# Patient Record
Sex: FEMALE | Race: WHITE | Employment: OTHER | URBAN - METROPOLITAN AREA
[De-identification: names, ages, dates, MRNs, and addresses within clinical notes are randomized per-mention and may not be internally consistent; named-entity substitution may affect disease eponyms.]

---

## 2018-04-10 ENCOUNTER — OFFICE VISIT (OUTPATIENT)
Dept: CARDIOLOGY CLINIC | Age: 73
End: 2018-04-10
Payer: MEDICARE

## 2018-04-10 VITALS
HEIGHT: 63 IN | HEART RATE: 65 BPM | WEIGHT: 152 LBS | SYSTOLIC BLOOD PRESSURE: 132 MMHG | BODY MASS INDEX: 26.93 KG/M2 | RESPIRATION RATE: 16 BRPM | DIASTOLIC BLOOD PRESSURE: 70 MMHG

## 2018-04-10 DIAGNOSIS — I10 ESSENTIAL HYPERTENSION: ICD-10-CM

## 2018-04-10 DIAGNOSIS — R94.39 ABNORMAL STRESS TEST: Primary | ICD-10-CM

## 2018-04-10 DIAGNOSIS — E78.2 MIXED HYPERLIPIDEMIA: ICD-10-CM

## 2018-04-10 DIAGNOSIS — E78.5 HYPERLIPIDEMIA, UNSPECIFIED HYPERLIPIDEMIA TYPE: ICD-10-CM

## 2018-04-10 DIAGNOSIS — R94.39 ABNORMAL STRESS TEST: ICD-10-CM

## 2018-04-10 DIAGNOSIS — R53.82 CHRONIC FATIGUE: ICD-10-CM

## 2018-04-10 PROCEDURE — 1123F ACP DISCUSS/DSCN MKR DOCD: CPT | Performed by: INTERNAL MEDICINE

## 2018-04-10 PROCEDURE — 1090F PRES/ABSN URINE INCON ASSESS: CPT | Performed by: INTERNAL MEDICINE

## 2018-04-10 PROCEDURE — G8427 DOCREV CUR MEDS BY ELIG CLIN: HCPCS | Performed by: INTERNAL MEDICINE

## 2018-04-10 PROCEDURE — 3014F SCREEN MAMMO DOC REV: CPT | Performed by: INTERNAL MEDICINE

## 2018-04-10 PROCEDURE — 1036F TOBACCO NON-USER: CPT | Performed by: INTERNAL MEDICINE

## 2018-04-10 PROCEDURE — 99214 OFFICE O/P EST MOD 30 MIN: CPT | Performed by: INTERNAL MEDICINE

## 2018-04-10 PROCEDURE — 93000 ELECTROCARDIOGRAM COMPLETE: CPT | Performed by: INTERNAL MEDICINE

## 2018-04-10 PROCEDURE — 4040F PNEUMOC VAC/ADMIN/RCVD: CPT | Performed by: INTERNAL MEDICINE

## 2018-04-10 PROCEDURE — G8400 PT W/DXA NO RESULTS DOC: HCPCS | Performed by: INTERNAL MEDICINE

## 2018-04-10 PROCEDURE — G8419 CALC BMI OUT NRM PARAM NOF/U: HCPCS | Performed by: INTERNAL MEDICINE

## 2018-04-10 PROCEDURE — 3017F COLORECTAL CA SCREEN DOC REV: CPT | Performed by: INTERNAL MEDICINE

## 2018-04-10 RX ORDER — ISOSORBIDE MONONITRATE 30 MG/1
30 TABLET, EXTENDED RELEASE ORAL DAILY
Qty: 90 TABLET | Refills: 3 | Status: SHIPPED | OUTPATIENT
Start: 2018-04-10 | End: 2018-04-10 | Stop reason: SDUPTHER

## 2018-04-10 RX ORDER — ISOSORBIDE MONONITRATE 30 MG/1
30 TABLET, EXTENDED RELEASE ORAL DAILY
Qty: 90 TABLET | Refills: 3 | Status: SHIPPED | OUTPATIENT
Start: 2018-04-10 | End: 2019-09-06 | Stop reason: SDUPTHER

## 2018-04-10 RX ORDER — TRAMADOL HYDROCHLORIDE 50 MG/1
50 TABLET ORAL EVERY 8 HOURS PRN
COMMUNITY
Start: 2018-02-12 | End: 2021-04-14

## 2018-07-18 ENCOUNTER — HOSPITAL ENCOUNTER (OUTPATIENT)
Dept: GENERAL RADIOLOGY | Age: 73
Discharge: HOME | End: 2018-07-20
Payer: MEDICARE

## 2018-07-18 ENCOUNTER — HOSPITAL ENCOUNTER (OUTPATIENT)
Age: 73
Discharge: HOME | End: 2018-07-20
Payer: MEDICARE

## 2018-07-18 DIAGNOSIS — R05.9 COUGH: ICD-10-CM

## 2018-07-18 PROCEDURE — 71046 X-RAY EXAM CHEST 2 VIEWS: CPT

## 2018-10-10 ENCOUNTER — OFFICE VISIT (OUTPATIENT)
Dept: CARDIOLOGY CLINIC | Age: 73
End: 2018-10-10
Payer: MEDICARE

## 2018-10-10 VITALS
WEIGHT: 147.4 LBS | RESPIRATION RATE: 18 BRPM | BODY MASS INDEX: 26.12 KG/M2 | DIASTOLIC BLOOD PRESSURE: 78 MMHG | HEART RATE: 63 BPM | HEIGHT: 63 IN | SYSTOLIC BLOOD PRESSURE: 132 MMHG

## 2018-10-10 DIAGNOSIS — I51.9 HEART PROBLEM: ICD-10-CM

## 2018-10-10 DIAGNOSIS — I10 ESSENTIAL HYPERTENSION: ICD-10-CM

## 2018-10-10 DIAGNOSIS — R94.39 ABNORMAL STRESS TEST: Primary | ICD-10-CM

## 2018-10-10 DIAGNOSIS — E78.2 MIXED HYPERLIPIDEMIA: ICD-10-CM

## 2018-10-10 DIAGNOSIS — R53.82 CHRONIC FATIGUE: ICD-10-CM

## 2018-10-10 PROCEDURE — 93000 ELECTROCARDIOGRAM COMPLETE: CPT | Performed by: INTERNAL MEDICINE

## 2018-10-10 PROCEDURE — 1036F TOBACCO NON-USER: CPT | Performed by: INTERNAL MEDICINE

## 2018-10-10 PROCEDURE — 3017F COLORECTAL CA SCREEN DOC REV: CPT | Performed by: INTERNAL MEDICINE

## 2018-10-10 PROCEDURE — 4040F PNEUMOC VAC/ADMIN/RCVD: CPT | Performed by: INTERNAL MEDICINE

## 2018-10-10 PROCEDURE — 1101F PT FALLS ASSESS-DOCD LE1/YR: CPT | Performed by: INTERNAL MEDICINE

## 2018-10-10 PROCEDURE — G8484 FLU IMMUNIZE NO ADMIN: HCPCS | Performed by: INTERNAL MEDICINE

## 2018-10-10 PROCEDURE — 1123F ACP DISCUSS/DSCN MKR DOCD: CPT | Performed by: INTERNAL MEDICINE

## 2018-10-10 PROCEDURE — G8419 CALC BMI OUT NRM PARAM NOF/U: HCPCS | Performed by: INTERNAL MEDICINE

## 2018-10-10 PROCEDURE — G8400 PT W/DXA NO RESULTS DOC: HCPCS | Performed by: INTERNAL MEDICINE

## 2018-10-10 PROCEDURE — 99213 OFFICE O/P EST LOW 20 MIN: CPT | Performed by: INTERNAL MEDICINE

## 2018-10-10 PROCEDURE — 1090F PRES/ABSN URINE INCON ASSESS: CPT | Performed by: INTERNAL MEDICINE

## 2018-10-10 PROCEDURE — G8427 DOCREV CUR MEDS BY ELIG CLIN: HCPCS | Performed by: INTERNAL MEDICINE

## 2019-09-06 DIAGNOSIS — I10 ESSENTIAL HYPERTENSION: ICD-10-CM

## 2019-09-06 DIAGNOSIS — E78.5 HYPERLIPIDEMIA, UNSPECIFIED HYPERLIPIDEMIA TYPE: ICD-10-CM

## 2019-09-06 DIAGNOSIS — R94.39 ABNORMAL STRESS TEST: ICD-10-CM

## 2019-09-06 RX ORDER — ISOSORBIDE MONONITRATE 30 MG/1
TABLET, EXTENDED RELEASE ORAL
Qty: 30 TABLET | Refills: 2 | Status: SHIPPED | OUTPATIENT
Start: 2019-09-06 | End: 2019-09-09 | Stop reason: SDUPTHER

## 2019-09-09 DIAGNOSIS — R94.39 ABNORMAL STRESS TEST: ICD-10-CM

## 2019-09-09 DIAGNOSIS — E78.5 HYPERLIPIDEMIA, UNSPECIFIED HYPERLIPIDEMIA TYPE: ICD-10-CM

## 2019-09-09 DIAGNOSIS — I10 ESSENTIAL HYPERTENSION: ICD-10-CM

## 2019-09-09 RX ORDER — ISOSORBIDE MONONITRATE 30 MG/1
TABLET, EXTENDED RELEASE ORAL
Qty: 90 TABLET | Refills: 3 | Status: SHIPPED
Start: 2019-09-09 | End: 2020-04-13 | Stop reason: SDUPTHER

## 2019-11-08 ENCOUNTER — OFFICE VISIT (OUTPATIENT)
Dept: CARDIOLOGY CLINIC | Age: 74
End: 2019-11-08
Payer: MEDICARE

## 2019-11-08 VITALS
RESPIRATION RATE: 16 BRPM | WEIGHT: 150 LBS | DIASTOLIC BLOOD PRESSURE: 70 MMHG | HEIGHT: 63 IN | SYSTOLIC BLOOD PRESSURE: 130 MMHG | HEART RATE: 75 BPM | BODY MASS INDEX: 26.58 KG/M2

## 2019-11-08 DIAGNOSIS — R94.39 ABNORMAL STRESS TEST: ICD-10-CM

## 2019-11-08 DIAGNOSIS — I51.9 HEART PROBLEM: ICD-10-CM

## 2019-11-08 DIAGNOSIS — I10 ESSENTIAL HYPERTENSION: Primary | ICD-10-CM

## 2019-11-08 DIAGNOSIS — R53.82 CHRONIC FATIGUE: ICD-10-CM

## 2019-11-08 DIAGNOSIS — E78.2 MIXED HYPERLIPIDEMIA: ICD-10-CM

## 2019-11-08 PROCEDURE — 1123F ACP DISCUSS/DSCN MKR DOCD: CPT | Performed by: INTERNAL MEDICINE

## 2019-11-08 PROCEDURE — G8400 PT W/DXA NO RESULTS DOC: HCPCS | Performed by: INTERNAL MEDICINE

## 2019-11-08 PROCEDURE — 3017F COLORECTAL CA SCREEN DOC REV: CPT | Performed by: INTERNAL MEDICINE

## 2019-11-08 PROCEDURE — G8427 DOCREV CUR MEDS BY ELIG CLIN: HCPCS | Performed by: INTERNAL MEDICINE

## 2019-11-08 PROCEDURE — 4040F PNEUMOC VAC/ADMIN/RCVD: CPT | Performed by: INTERNAL MEDICINE

## 2019-11-08 PROCEDURE — 1036F TOBACCO NON-USER: CPT | Performed by: INTERNAL MEDICINE

## 2019-11-08 PROCEDURE — G8417 CALC BMI ABV UP PARAM F/U: HCPCS | Performed by: INTERNAL MEDICINE

## 2019-11-08 PROCEDURE — 99213 OFFICE O/P EST LOW 20 MIN: CPT | Performed by: INTERNAL MEDICINE

## 2019-11-08 PROCEDURE — 1090F PRES/ABSN URINE INCON ASSESS: CPT | Performed by: INTERNAL MEDICINE

## 2019-11-08 PROCEDURE — 93000 ELECTROCARDIOGRAM COMPLETE: CPT | Performed by: INTERNAL MEDICINE

## 2019-11-08 PROCEDURE — G8484 FLU IMMUNIZE NO ADMIN: HCPCS | Performed by: INTERNAL MEDICINE

## 2019-11-08 RX ORDER — NITROFURANTOIN 25; 75 MG/1; MG/1
CAPSULE ORAL
Refills: 0 | COMMUNITY
Start: 2019-11-05 | End: 2021-04-14

## 2020-04-13 RX ORDER — ISOSORBIDE MONONITRATE 30 MG/1
TABLET, EXTENDED RELEASE ORAL
Qty: 90 TABLET | Refills: 3 | Status: SHIPPED | OUTPATIENT
Start: 2020-04-13

## 2021-03-09 ENCOUNTER — HOSPITAL ENCOUNTER (OUTPATIENT)
Dept: GENERAL RADIOLOGY | Age: 76
Discharge: HOME | End: 2021-03-11
Payer: MEDICARE

## 2021-03-09 ENCOUNTER — HOSPITAL ENCOUNTER (OUTPATIENT)
Age: 76
Discharge: HOME | End: 2021-03-11
Payer: MEDICARE

## 2021-03-09 DIAGNOSIS — M54.40 ACUTE RIGHT-SIDED LOW BACK PAIN WITH SCIATICA, SCIATICA LATERALITY UNSPECIFIED: ICD-10-CM

## 2021-03-09 PROCEDURE — 72170 X-RAY EXAM OF PELVIS: CPT

## 2021-04-14 ENCOUNTER — OFFICE VISIT (OUTPATIENT)
Dept: CARDIOLOGY CLINIC | Age: 76
End: 2021-04-14
Payer: MEDICARE

## 2021-04-14 VITALS
HEIGHT: 63 IN | DIASTOLIC BLOOD PRESSURE: 76 MMHG | HEART RATE: 73 BPM | WEIGHT: 159 LBS | SYSTOLIC BLOOD PRESSURE: 132 MMHG | RESPIRATION RATE: 18 BRPM | BODY MASS INDEX: 28.17 KG/M2

## 2021-04-14 DIAGNOSIS — R06.09 DOE (DYSPNEA ON EXERTION): Primary | ICD-10-CM

## 2021-04-14 DIAGNOSIS — R94.39 ABNORMAL STRESS TEST: ICD-10-CM

## 2021-04-14 DIAGNOSIS — E78.2 MIXED HYPERLIPIDEMIA: ICD-10-CM

## 2021-04-14 DIAGNOSIS — I10 ESSENTIAL HYPERTENSION: ICD-10-CM

## 2021-04-14 PROCEDURE — 93000 ELECTROCARDIOGRAM COMPLETE: CPT | Performed by: INTERNAL MEDICINE

## 2021-04-14 PROCEDURE — 1036F TOBACCO NON-USER: CPT | Performed by: INTERNAL MEDICINE

## 2021-04-14 PROCEDURE — G8417 CALC BMI ABV UP PARAM F/U: HCPCS | Performed by: INTERNAL MEDICINE

## 2021-04-14 PROCEDURE — 1123F ACP DISCUSS/DSCN MKR DOCD: CPT | Performed by: INTERNAL MEDICINE

## 2021-04-14 PROCEDURE — G8400 PT W/DXA NO RESULTS DOC: HCPCS | Performed by: INTERNAL MEDICINE

## 2021-04-14 PROCEDURE — 1090F PRES/ABSN URINE INCON ASSESS: CPT | Performed by: INTERNAL MEDICINE

## 2021-04-14 PROCEDURE — G8427 DOCREV CUR MEDS BY ELIG CLIN: HCPCS | Performed by: INTERNAL MEDICINE

## 2021-04-14 PROCEDURE — 99214 OFFICE O/P EST MOD 30 MIN: CPT | Performed by: INTERNAL MEDICINE

## 2021-04-14 PROCEDURE — 3017F COLORECTAL CA SCREEN DOC REV: CPT | Performed by: INTERNAL MEDICINE

## 2021-04-14 PROCEDURE — 4040F PNEUMOC VAC/ADMIN/RCVD: CPT | Performed by: INTERNAL MEDICINE

## 2021-04-14 RX ORDER — SIMVASTATIN 40 MG
40 TABLET ORAL NIGHTLY
COMMUNITY

## 2021-04-14 RX ORDER — METOPROLOL SUCCINATE 25 MG/1
25 TABLET, EXTENDED RELEASE ORAL DAILY
Qty: 90 TABLET | Refills: 3 | Status: SHIPPED
Start: 2021-04-14 | End: 2021-05-24 | Stop reason: SINTOL

## 2021-04-14 NOTE — PROGRESS NOTES
OUTPATIENT CARDIOLOGY FOLLOW-UP    Name: Tom Kearns    Age: 70 y.o. Date of Service: 4/14/2021    Chief Complaint: Follow-up for abnormal stress test, WALKER    Referring Physician: Debra Ward MD    History of Present Illness:  Tom Kearns is a 70 y.o. female who presents today for follow-up of prior abnormal stress tests (see results below). She was previously a part time  at [employer] (retired). She denies a history of exertional chest pain. At the time of her prior office visit, she reported a > 1 year history of progressive fatigue, which she attributed to increased stress at home (24 year old granddaughter living with her, her  previously had prostate CA and lung CA) --> her  passed away in 5/2019, granddaughter initially went to college in [facility] (now she's enrolled in [employer]). Patient's energy level improved. She reports a ~ 2 month history of WALKER with moderate levels of exertion. She denies a history of palpitations, syncope, PND, or orthopnea. She is currently with no active cardiac complaints at rest. SR on EKG. Review of Systems:  Cardiac: As per HPI  General: No fever, chills  Pulmonary: As per HPI  HEENT: No visual disturbances, difficult swallowing  GI: No nausea, vomiting  : No dysuria, hematuria  Endocrine: No thyroid disease or DM  Musculoskeletal: XIONG x 4, no focal motor deficits  Skin: Intact, no rashes  Neuro: No headache, seizures  Psych: Currently with no depression, anxiety    Past Medical History:  Past Medical History:   Diagnosis Date    Hyperlipidemia     Hypertension        Past Surgical History:  Past Surgical History:   Procedure Laterality Date    COLONOSCOPY      TONSILLECTOMY       Family History:  History reviewed. No pertinent family history. Social History:  Social History     Socioeconomic History    Marital status:       Spouse name: Not on file    Number of children: Not on file    Years of education: Not on visit.        Physical Exam:  /76   Pulse 73   Resp 18   Ht 5' 3\" (1.6 m)   Wt 159 lb (72.1 kg)   BMI 28.17 kg/m²   Wt Readings from Last 3 Encounters:   04/14/21 159 lb (72.1 kg)   11/08/19 150 lb (68 kg)   10/10/18 147 lb 6.4 oz (66.9 kg)     Appearance: Awake, alert, no acute respiratory distress  Skin: Intact, no rash  Head: Normocephalic, atraumatic  Eyes: EOMI, no conjunctival erythema  ENMT: No pharyngeal erythema, MMM, no rhinorrhea  Neck: Supple, no elevated JVP, no carotid bruits  Lungs: Clear to auscultation bilaterally. No wheezes, rales, or rhonchi. Cardiac: Regular rate and rhythm, +S1S2, no murmurs apparent  Abdomen: Soft, nontender, +bowel sounds  Extremities: Moves all extremities x 4, no lower extremity edema  Neurologic: No focal motor deficits apparent, normal mood and affect    Cardiac Tests:  ECG: SR, rate 73, NSSTT changes    Exercise nuclear stress test on 10/16/15 (Dr. Sherine West):   Exercise time 6 minutes, 7 MET's, 2 mm upsloping/horizontal ST depression in leads II,III, aVF, V4-V6 with exercise and persisting 3 minutes into recovery period, no chest pain with exercise, small-sized mildly reversible defect in the mid/apical anterior walls, EF 65% with normal wall motion    Exercise nuclear stress test: 4/21/17 (Dr. Kayla Acuña)  1. Exercise EKG was positive with intermediate predictive value for ischemia.    2. The patient experienced no chest pain with exercise. 3. The myocardial perfusion imaging was abnormal.    The abnormality was a a small sized completely reversible defect in the distal anterior and apical wall suggesting distal LAD disease. 4. Overall left ventricular systolic function was normal without regional wall motion abnormalities. 5. Cabrales treadmill score was -7.5 implying intermediate risk.    6. Exercise capacity was average.    7. Intermediate risk general exercise nuclear cardiac perfusion examination. ASSESSMENT / PLAN:  1. Dyspnea on exertion  2.  Abnormal stress test (10/2015 and 4/2017)   3. HTN -- controlled, BP today 132/76  4. HLD -- previously on lipitor (stopped d/t myalgias) --> tolerating simvastatin  5. Fatigue    - She opts for continued medical management at this time / she is agreeable to cardiac catheterization pending clinical course  - Will add Toprol XL 25 mg daily  - Continue current medications otherwise (including ASA, statin, and imdur)    Greater than 30 minutes was spent counseling the patient, reviewing the rationale for the above recommendations and reviewing the patient's current medication list, problem list and results of all previously ordered testing.     Charlie Carrizales MD  [facility]) Cardiology

## 2021-05-24 ENCOUNTER — TELEPHONE (OUTPATIENT)
Dept: CARDIOLOGY CLINIC | Age: 76
End: 2021-05-24

## 2021-05-24 NOTE — TELEPHONE ENCOUNTER
Patient states she took herself off the metoprolol and her BP went to 185/91 but its back down to 146/81. She is asking for something else due to not feeling well on Metoprolol. Please advise.

## 2021-05-24 NOTE — TELEPHONE ENCOUNTER
See if she can  go back on norvasc 5 mg daily    Celestine Hill D.O.   Cardiologist  Cardiology, [facility]  \

## 2021-05-25 RX ORDER — AMLODIPINE BESYLATE 5 MG/1
5 TABLET ORAL DAILY
Qty: 30 TABLET | Refills: 5 | Status: SHIPPED
Start: 2021-05-25 | End: 2021-06-09 | Stop reason: SDUPTHER

## 2021-06-09 RX ORDER — AMLODIPINE BESYLATE 5 MG/1
5 TABLET ORAL DAILY
Qty: 90 TABLET | Refills: 3 | Status: SHIPPED
Start: 2021-06-09 | End: 2022-02-07 | Stop reason: DRUGHIGH

## 2022-01-13 ENCOUNTER — OFFICE VISIT (OUTPATIENT)
Dept: CARDIOLOGY CLINIC | Age: 77
End: 2022-01-13
Payer: MEDICARE

## 2022-01-13 VITALS
HEART RATE: 76 BPM | SYSTOLIC BLOOD PRESSURE: 132 MMHG | RESPIRATION RATE: 18 BRPM | HEIGHT: 63 IN | BODY MASS INDEX: 28.28 KG/M2 | WEIGHT: 159.6 LBS | DIASTOLIC BLOOD PRESSURE: 76 MMHG

## 2022-01-13 DIAGNOSIS — I10 ESSENTIAL HYPERTENSION: ICD-10-CM

## 2022-01-13 DIAGNOSIS — E78.2 MIXED HYPERLIPIDEMIA: ICD-10-CM

## 2022-01-13 DIAGNOSIS — R94.39 ABNORMAL STRESS TEST: ICD-10-CM

## 2022-01-13 DIAGNOSIS — R06.09 DOE (DYSPNEA ON EXERTION): Primary | ICD-10-CM

## 2022-01-13 PROCEDURE — G8484 FLU IMMUNIZE NO ADMIN: HCPCS | Performed by: INTERNAL MEDICINE

## 2022-01-13 PROCEDURE — 93000 ELECTROCARDIOGRAM COMPLETE: CPT | Performed by: INTERNAL MEDICINE

## 2022-01-13 PROCEDURE — 4040F PNEUMOC VAC/ADMIN/RCVD: CPT | Performed by: INTERNAL MEDICINE

## 2022-01-13 PROCEDURE — 99214 OFFICE O/P EST MOD 30 MIN: CPT | Performed by: INTERNAL MEDICINE

## 2022-01-13 PROCEDURE — G8400 PT W/DXA NO RESULTS DOC: HCPCS | Performed by: INTERNAL MEDICINE

## 2022-01-13 PROCEDURE — G8427 DOCREV CUR MEDS BY ELIG CLIN: HCPCS | Performed by: INTERNAL MEDICINE

## 2022-01-13 PROCEDURE — 3017F COLORECTAL CA SCREEN DOC REV: CPT | Performed by: INTERNAL MEDICINE

## 2022-01-13 PROCEDURE — 1123F ACP DISCUSS/DSCN MKR DOCD: CPT | Performed by: INTERNAL MEDICINE

## 2022-01-13 PROCEDURE — 1036F TOBACCO NON-USER: CPT | Performed by: INTERNAL MEDICINE

## 2022-01-13 PROCEDURE — 1090F PRES/ABSN URINE INCON ASSESS: CPT | Performed by: INTERNAL MEDICINE

## 2022-01-13 PROCEDURE — G8417 CALC BMI ABV UP PARAM F/U: HCPCS | Performed by: INTERNAL MEDICINE

## 2022-01-13 RX ORDER — MULTIVIT WITH MINERALS/LUTEIN
1000 TABLET ORAL DAILY
COMMUNITY

## 2022-01-13 RX ORDER — CHOLECALCIFEROL (VITAMIN D3) 125 MCG
5000 CAPSULE ORAL DAILY
COMMUNITY

## 2022-01-13 RX ORDER — PSYLLIUM HUSK 0.4 G
CAPSULE ORAL DAILY
COMMUNITY

## 2022-01-13 NOTE — PROGRESS NOTES
OUTPATIENT CARDIOLOGY FOLLOW-UP    Name: Mayela Neumann    Age: 78 y.o. Date of Service: 1/13/2022    Chief Complaint: Follow-up for abnormal stress test, WALKER    Referring Physician: Veto Sethi MD    History of Present Illness:  Mayela Neumann is a 78 y.o. female who presents today for follow-up of prior abnormal stress tests (see results below). She was previously a part time  at [employer] (retired). She denies a history of exertional chest pain. At the time of her prior office visit, she reported a > 1 year history of progressive fatigue, which she attributed to increased stress at home -- (24 year old granddaughter living with her, her  previously had prostate CA and lung CA) --> her  passed away in 5/2019, granddaughter initially went to college in [facility] (now she's enrolled in [email or web address]). Patient's energy level improved. She reports a > 1 year history history of intermittent WALKER with moderate levels of exertion. She denies a history of palpitations, syncope, PND, or orthopnea. She is currently with no active cardiac complaints at rest. SR on EKG. Review of Systems:  Cardiac: As per HPI  General: No fever, chills  Pulmonary: As per HPI  HEENT: No visual disturbances, difficult swallowing  GI: No nausea, vomiting  : No dysuria, hematuria  Endocrine: No thyroid disease or DM  Musculoskeletal: XIONG x 4, no focal motor deficits  Skin: Intact, no rashes  Neuro: No headache, seizures  Psych: Currently with no depression, anxiety    Past Medical History:  Past Medical History:   Diagnosis Date    Hyperlipidemia     Hypertension        Past Surgical History:  Past Surgical History:   Procedure Laterality Date    COLONOSCOPY      TONSILLECTOMY       Family History:  History reviewed. No pertinent family history. Social History:  Social History     Socioeconomic History    Marital status:       Spouse name: Not on file    Number of children: Not on file    Years of education: Not on file    Highest education level: Not on file   Occupational History    Not on file   Tobacco Use    Smoking status: Never Smoker    Smokeless tobacco: Never Used   Vaping Use    Vaping Use: Never used   Substance and Sexual Activity    Alcohol use: No    Drug use: No    Sexual activity: Not on file   Other Topics Concern    Not on file   Social History Narrative    Not on file     Social Determinants of Health     Financial Resource Strain:     Difficulty of Paying Living Expenses: Not on file   Food Insecurity:     Worried About Running Out of Food in the Last Year: Not on file    Kenisha of Food in the Last Year: Not on file   Transportation Needs:     Lack of Transportation (Medical): Not on file    Lack of Transportation (Non-Medical): Not on file   Physical Activity:     Days of Exercise per Week: Not on file    Minutes of Exercise per Session: Not on file   Stress:     Feeling of Stress : Not on file   Social Connections:     Frequency of Communication with Friends and Family: Not on file    Frequency of Social Gatherings with Friends and Family: Not on file    Attends Scientology Services: Not on file    Active Member of [address] [place] [address] or Organizations: Not on file    Attends Club or Organization Meetings: Not on file    Marital Status: Not on file   Intimate Partner Violence:     Fear of Current or Ex-Partner: Not on file    Emotionally Abused: Not on file    Physically Abused: Not on file    Sexually Abused: Not on file   Housing Stability:     Unable to Pay for Housing in the Last Year: Not on file    Number of [place] in the Last Year: Not on file    Unstable Housing in the Last Year: Not on file       Allergies:   Allergies   Allergen Reactions    Lipitor [Atorvastatin]      Caused my bones to ache        Current Medications:  Current Outpatient Medications   Medication Sig Dispense Refill    Cholecalciferol (VITAMIN D) 125 MCG (5000 UT) CAPS Take 2 mm upsloping/horizontal ST depression in leads II,III, aVF, V4-V6 with exercise and persisting 3 minutes into recovery period, no chest pain with exercise, small-sized mildly reversible defect in the mid/apical anterior walls, EF 65% with normal wall motion    Exercise nuclear stress test: 4/21/17 (Dr. Marcelle Waldron)  1. Exercise EKG was positive with intermediate predictive value for ischemia.    2. The patient experienced no chest pain with exercise. 3. The myocardial perfusion imaging was abnormal.    The abnormality was a a small sized completely reversible defect in the distal anterior and apical wall suggesting distal LAD disease. 4. Overall left ventricular systolic function was normal without regional wall motion abnormalities. 5. Cabrales treadmill score was -7.5 implying intermediate risk.    6. Exercise capacity was average.    7. Intermediate risk general exercise nuclear cardiac perfusion examination. ASSESSMENT / PLAN:  1. Dyspnea on exertion -- stable  2. Abnormal stress test (10/2015 and 4/2017)   3. HTN -- controlled/intermittently elevated, BP today 132/76  4. HLD -- previously on lipitor (stopped d/t myalgias) --> tolerating simvastatin  5. Fatigue -- improved    - She opts for continued medical management at this time / she continues to defer cardiac catheterization / discussed option on coronary CTA  - Toprol XL 25 mg daily added at 4/2021 office visit --> stopped in 5/2021 due to side effects  - No longer taking imdur  - Continue current medications otherwise (including ASA, statin, and norvasc)  - Continue home BP monitoring --> if BP elevated, increase norvasc to 10 mg daily    Greater than 30 minutes was spent counseling the patient, reviewing the rationale for the above recommendations and reviewing the patient's current medication list, problem list and results of all previously ordered testing.     Marilee Cleveland MD  [facility]) Cardiology

## 2022-02-07 RX ORDER — AMLODIPINE BESYLATE 10 MG/1
10 TABLET ORAL DAILY
Qty: 90 TABLET | Refills: 3 | Status: SHIPPED
Start: 2022-02-07 | End: 2022-02-21 | Stop reason: SDUPTHER

## 2022-02-07 RX ORDER — AMLODIPINE BESYLATE 10 MG/1
10 TABLET ORAL DAILY
COMMUNITY
End: 2022-02-07 | Stop reason: SDUPTHER

## 2022-02-21 RX ORDER — AMLODIPINE BESYLATE 10 MG/1
10 TABLET ORAL DAILY
Qty: 90 TABLET | Refills: 3 | Status: SHIPPED | OUTPATIENT
Start: 2022-02-21

## 2022-02-21 RX ORDER — AMLODIPINE BESYLATE 10 MG/1
10 TABLET ORAL DAILY
Qty: 10 TABLET | Refills: 0 | Status: SHIPPED | OUTPATIENT
Start: 2022-02-21

## 2022-02-28 RX ORDER — AMLODIPINE BESYLATE 10 MG/1
10 TABLET ORAL DAILY
Qty: 10 TABLET | Refills: 0 | OUTPATIENT
Start: 2022-02-28

## 2022-05-10 ENCOUNTER — HOSPITAL ENCOUNTER (OUTPATIENT)
Dept: CT IMAGING | Age: 77
Discharge: HOME | End: 2022-05-12
Payer: MEDICARE

## 2022-05-10 DIAGNOSIS — R42 DIZZINESS AND GIDDINESS: ICD-10-CM

## 2022-05-10 DIAGNOSIS — R51.9 FACIAL PAIN: ICD-10-CM

## 2022-05-10 PROCEDURE — 2580000003 HC RX 258: Performed by: RADIOLOGY

## 2022-05-10 PROCEDURE — 70498 CT ANGIOGRAPHY NECK: CPT

## 2022-05-10 PROCEDURE — 70450 CT HEAD/BRAIN W/O DYE: CPT

## 2022-05-10 PROCEDURE — 6360000004 HC RX CONTRAST MEDICATION: Performed by: RADIOLOGY

## 2022-05-10 RX ORDER — SODIUM CHLORIDE 0.9 % (FLUSH) 0.9 %
10 SYRINGE (ML) INJECTION PRN
Status: DISCONTINUED | OUTPATIENT
Start: 2022-05-10 | End: 2022-05-13 | Stop reason: HOSPADM

## 2022-05-10 RX ADMIN — IOPAMIDOL 75 ML: 755 INJECTION, SOLUTION INTRAVENOUS at 13:04

## 2022-05-10 RX ADMIN — SODIUM CHLORIDE, PRESERVATIVE FREE 10 ML: 5 INJECTION INTRAVENOUS at 13:04

## 2022-06-13 ENCOUNTER — TELEPHONE (OUTPATIENT)
Dept: VASCULAR SURGERY | Age: 77
End: 2022-06-13

## 2022-06-13 NOTE — TELEPHONE ENCOUNTER
Called to confirm appointment for 6/14/22 at 1115 a.m, left message with date, time, and phone number for patient.

## 2022-06-14 ENCOUNTER — OFFICE VISIT (OUTPATIENT)
Dept: VASCULAR SURGERY | Age: 77
End: 2022-06-14
Payer: MEDICARE

## 2022-06-14 VITALS — BODY MASS INDEX: 27.66 KG/M2 | HEIGHT: 64 IN | WEIGHT: 162 LBS

## 2022-06-14 DIAGNOSIS — I65.23 BILATERAL CAROTID ARTERY STENOSIS: Primary | ICD-10-CM

## 2022-06-14 PROCEDURE — 99203 OFFICE O/P NEW LOW 30 MIN: CPT | Performed by: SURGERY

## 2022-06-14 PROCEDURE — 1123F ACP DISCUSS/DSCN MKR DOCD: CPT | Performed by: SURGERY

## 2022-06-14 PROCEDURE — G8400 PT W/DXA NO RESULTS DOC: HCPCS | Performed by: SURGERY

## 2022-06-14 PROCEDURE — G8417 CALC BMI ABV UP PARAM F/U: HCPCS | Performed by: SURGERY

## 2022-06-14 PROCEDURE — 1036F TOBACCO NON-USER: CPT | Performed by: SURGERY

## 2022-06-14 PROCEDURE — 1090F PRES/ABSN URINE INCON ASSESS: CPT | Performed by: SURGERY

## 2022-06-14 PROCEDURE — G8427 DOCREV CUR MEDS BY ELIG CLIN: HCPCS | Performed by: SURGERY

## 2022-06-14 PROCEDURE — 3017F COLORECTAL CA SCREEN DOC REV: CPT | Performed by: SURGERY

## 2022-06-14 NOTE — PROGRESS NOTES
Vascular Surgery Outpatient Consultation      Chief Complaint   Patient presents with    Consultation     new pt. ARJUN       Reason for Consult: Carotid artery stenosis    Requesting Physician:  Dr. Michelle Dempsey:                The patient is a 78 y.o. female who is referred for evaluation of carotid artery stenosis. The patient has a history of chronic jaw pain and headaches due to impacted teeth and is being worked up for extraction and implant. She was concerned with the cost of the procedure and wanted to make sure that she did not have anything else going on and a CAT scan of the head neck was performed that reported carotid stenosis. She denies any symptoms of stroke, mini stroke or amaurosis fugax. Past Medical History:        Diagnosis Date    ARJUN (cerebral atherosclerosis)     Hyperlipidemia     Hypertension      Past Surgical History:        Procedure Laterality Date    COLONOSCOPY      TONSILLECTOMY       Current Medications:   Prior to Admission medications    Medication Sig Start Date End Date Taking?  Authorizing Provider   amLODIPine (NORVASC) 10 MG tablet Take 1 tablet by mouth daily 2/21/22  Yes Emily Meneses MD   amLODIPine (NORVASC) 10 MG tablet Take 1 tablet by mouth daily 2/21/22  Yes Emily Meneses MD   Cholecalciferol (VITAMIN D) 125 MCG (5000 UT) CAPS Take 5,000 Units by mouth daily   Yes Historical Provider, MD   vitamin E 1000 units capsule Take 1,000 Units by mouth daily   Yes Historical Provider, MD   Calcium Carb-Cholecalciferol (CALCIUM 1000 + D) 1000-800 MG-UNIT TABS Take by mouth daily   Yes Historical Provider, MD   MAGNESIUM CHLORIDE-CALCIUM PO Take by mouth daily   Yes Historical Provider, MD   PROMETHAZINE HCL PO Take by mouth   Yes Historical Provider, MD   DEXTROMETHORPHAN HBR PO Take by mouth   Yes Historical Provider, MD   simvastatin (ZOCOR) 40 MG tablet Take 40 mg by mouth nightly   Yes Historical Provider, MD   isosorbide mononitrate (IMDUR) 30 MG extended release tablet TAKE 1 TABLET DAILY 4/13/20  Yes Edelmira Nye MD   ibuprofen (ADVIL;MOTRIN) 800 MG tablet Take 800 mg by mouth as needed  3/12/17  Yes Historical Provider, MD   aspirin-acetaminophen-caffeine (Alisson Pina) 865-528-58 MG per tablet Take 1 tablet by mouth every 6 hours as needed for Headaches   Yes Historical Provider, MD   zolpidem (AMBIEN) 10 MG tablet Take 10 mg by mouth nightly. Yes Historical Provider, MD   traZODone HCl (OLEPTRO) 150 MG TB24 Take 75 mg by mouth nightly    Yes Historical Provider, MD     Allergies:  Lipitor [atorvastatin]    Social History     Socioeconomic History    Marital status:      Spouse name: Not on file    Number of children: Not on file    Years of education: Not on file    Highest education level: Not on file   Occupational History    Not on file   Tobacco Use    Smoking status: Never Smoker    Smokeless tobacco: Never Used   Vaping Use    Vaping Use: Never used   Substance and Sexual Activity    Alcohol use: No    Drug use: No    Sexual activity: Not on file   Other Topics Concern    Not on file   Social History Narrative    Not on file     Social Determinants of Health     Financial Resource Strain:     Difficulty of Paying Living Expenses: Not on file   Food Insecurity:     Worried About Running Out of Food in the Last Year: Not on file    Kenisha of Food in the Last Year: Not on file   Transportation Needs:     Lack of Transportation (Medical): Not on file    Lack of Transportation (Non-Medical):  Not on file   Physical Activity:     Days of Exercise per Week: Not on file    Minutes of Exercise per Session: Not on file   Stress:     Feeling of Stress : Not on file   Social Connections:     Frequency of Communication with Friends and Family: Not on file    Frequency of Social Gatherings with Friends and Family: Not on file    Attends Yazdanism Services: Not on file   [employer] of [employer]s or Organizations: Not on file    Attends Club or Organization Meetings: Not on file    Marital Status: Not on file   Intimate Partner Violence:     Fear of Current or Ex-Partner: Not on file    Emotionally Abused: Not on file    Physically Abused: Not on file    Sexually Abused: Not on file   Housing Stability:     Unable to Pay for Housing in the Last Year: Not on file    Number of Mg in the Last Year: Not on file    Unstable Housing in the Last Year: Not on file        History reviewed. No pertinent family history.     REVIEW OF SYSTEMS (New symptoms):    Eyes:      Blurred vision:  No [x]/Yes []               Diplopia:   No [x]/Yes []               Vision loss:       No [x]/Yes []   Ears, nose, throat:             Hearing loss:    No [x]/Yes []      Vertigo:   No [x]/Yes []                       Swallowing problem:  No [x]/Yes []               Nose bleeds:   No [x]/Yes []      Voice hoarseness:  No [x]/Yes []  Respiratory:             Cough:   No [x]/Yes []      Pleuritic chest pain:  No [x]/Yes []                        Dyspnea:   No [x]/Yes []      Wheezing:   No [x]/Yes []  Cardiovascular:             Angina:   No [x]/Yes []      Palpitations:   No [x]/Yes []          Claudication:    No [x]/Yes []      Leg swelling:   No [x]/Yes []  Gastrointestinal:             Nausea or vomiting:  No [x]/Yes []               Abdominal pain:  No [x]/Yes []                     Intestinal bleeding: No [x]/Yes []  Musculoskeletal:             Leg pain:   No [x]/Yes []      Back pain:   No [x]/Yes []                    Weakness:   No [x]/Yes []  Neurologic:             Numbness:   No [x]/Yes []      Paralysis:   No [x]/Yes []                       Headaches:   No [x]/Yes []  Hematologic, lymphatic:   Anemia:   No [x]/Yes []              Bleeding or bruising:  No [x]/Yes []              Fevers or chills: No [x]/Yes []  Endocrine:             Temp intolerance:   No [x]/Yes []                       Polydipsia, polyuria:  No [x]/Yes []  Skin:              Rash:    No [x]/Yes []      Ulcers:   No [x]/Yes []              Abnorm pigment: No [x]/Yes []  :              Frequency/urgency:  No [x]/Yes []      Hematuria:    No [x]/Yes []                      Incontinence:    No [x]/Yes []    PHYSICAL EXAM:  There were no vitals filed for this visit. General Appearance: alert and oriented to person, place and time, well developed and well- nourished, in no acute distress  Skin: warm and dry, no rash or erythema  Head: normocephalic and atraumatic  Eyes: extraocular eye movements intact, conjunctivae normal  ENT: external ear and ear canal normal bilaterally, nose without deformity  Pulmonary/Chest: clear to auscultation bilaterally- no wheezes, rales or rhonchi, normal air movement, no respiratory distress  Cardiovascular: normal rate, regular rhythm, normal S1 and S2, no murmurs, no carotid bruits  Abdomen: soft, non-tender, non-distended, normal bowel sounds, no masses or organomegaly  Musculoskeletal: normal range of motion, no joint swelling, deformity or tenderness  Neurologic: no cranial nerve deficit, gait, coordination and speech normal  Extremities: no leg edema bilaterally    PULSE EXAM      Right      Left   Brachial     Radial 3 3   Femoral     Popliteal     Dorsalis Pedis     Posterior Tibial     (3=normal, 2=diminished, 1=barely palpable, 4=widened)      Problem List Items Addressed This Visit     Bilateral carotid artery stenosis - Primary    Relevant Orders    US CAROTID ARTERY BILATERAL            I reviewed with the patient that she does have calcium buildup in the carotid arteries but does not appear to have a hemodynamically significant stenosis. Her symptoms are not due to carotid artery disease. I do not feel that she requires any additional testing or intervention at this time. I will plan to see her again in 1 year with a repeat ultrasound but asked her to call sooner with any problems.   I reviewed with her that from my standpoint she is cleared for any surgery. Return in about 1 year (around 6/14/2023) for testing.

## 2022-11-16 ENCOUNTER — TELEPHONE (OUTPATIENT)
Dept: CARDIOLOGY CLINIC | Age: 77
End: 2022-11-16

## 2022-11-16 NOTE — TELEPHONE ENCOUNTER
Patient called with complaints of worsening dyspnea on exertion and an ache in her chest (not every day). Symptoms have been present for the last couple of weeks.   Patient denies any symptoms at rest.

## 2022-11-19 NOTE — TELEPHONE ENCOUNTER
Can she been seen by Licha Galloway or Eboni Mena since I'm not in the office in the coming weeks. ER if symptoms persisting/worsening.

## 2022-11-21 NOTE — TELEPHONE ENCOUNTER
Patient notified of Dr. Omi Carson recommendations. Offered an appointment with Bruno Ledbetter NP today, patient declined as she is in [facility] for the holiday. She will call upon her return and we will see if we can squeeze her in.

## 2022-12-02 NOTE — TELEPHONE ENCOUNTER
Patient called stating she has returned from [place] and would like to see Dr. Vaughn Dudley.  She does not feel that her symptoms warrant going to the ER. F/U scheduled for 12/14/22 at 9:45 a.m.

## 2022-12-14 ENCOUNTER — OFFICE VISIT (OUTPATIENT)
Dept: CARDIOLOGY CLINIC | Age: 77
End: 2022-12-14
Payer: MEDICARE

## 2022-12-14 VITALS
DIASTOLIC BLOOD PRESSURE: 78 MMHG | HEART RATE: 83 BPM | BODY MASS INDEX: 26.46 KG/M2 | RESPIRATION RATE: 12 BRPM | SYSTOLIC BLOOD PRESSURE: 138 MMHG | WEIGHT: 155 LBS | HEIGHT: 64 IN

## 2022-12-14 DIAGNOSIS — I10 ESSENTIAL HYPERTENSION: ICD-10-CM

## 2022-12-14 DIAGNOSIS — Z86.79 HISTORY OF ABNORMAL ELECTROCARDIOGRAM: ICD-10-CM

## 2022-12-14 DIAGNOSIS — Z01.818 PREOP TESTING: ICD-10-CM

## 2022-12-14 DIAGNOSIS — I65.23 BILATERAL CAROTID ARTERY STENOSIS: ICD-10-CM

## 2022-12-14 DIAGNOSIS — R94.39 ABNORMAL STRESS TEST: ICD-10-CM

## 2022-12-14 DIAGNOSIS — E78.2 MIXED HYPERLIPIDEMIA: ICD-10-CM

## 2022-12-14 DIAGNOSIS — R07.2 PRECORDIAL PAIN: Primary | ICD-10-CM

## 2022-12-14 DIAGNOSIS — R06.09 DOE (DYSPNEA ON EXERTION): ICD-10-CM

## 2022-12-14 DIAGNOSIS — R06.09 DOE (DYSPNEA ON EXERTION): Primary | ICD-10-CM

## 2022-12-14 PROCEDURE — 3074F SYST BP LT 130 MM HG: CPT | Performed by: INTERNAL MEDICINE

## 2022-12-14 PROCEDURE — G8417 CALC BMI ABV UP PARAM F/U: HCPCS | Performed by: INTERNAL MEDICINE

## 2022-12-14 PROCEDURE — 3078F DIAST BP <80 MM HG: CPT | Performed by: INTERNAL MEDICINE

## 2022-12-14 PROCEDURE — 1123F ACP DISCUSS/DSCN MKR DOCD: CPT | Performed by: INTERNAL MEDICINE

## 2022-12-14 PROCEDURE — 99215 OFFICE O/P EST HI 40 MIN: CPT | Performed by: INTERNAL MEDICINE

## 2022-12-14 PROCEDURE — G8427 DOCREV CUR MEDS BY ELIG CLIN: HCPCS | Performed by: INTERNAL MEDICINE

## 2022-12-14 PROCEDURE — 1090F PRES/ABSN URINE INCON ASSESS: CPT | Performed by: INTERNAL MEDICINE

## 2022-12-14 PROCEDURE — 93000 ELECTROCARDIOGRAM COMPLETE: CPT | Performed by: INTERNAL MEDICINE

## 2022-12-14 PROCEDURE — G8484 FLU IMMUNIZE NO ADMIN: HCPCS | Performed by: INTERNAL MEDICINE

## 2022-12-14 PROCEDURE — G8400 PT W/DXA NO RESULTS DOC: HCPCS | Performed by: INTERNAL MEDICINE

## 2022-12-14 PROCEDURE — 1036F TOBACCO NON-USER: CPT | Performed by: INTERNAL MEDICINE

## 2022-12-14 PROCEDURE — 3017F COLORECTAL CA SCREEN DOC REV: CPT | Performed by: INTERNAL MEDICINE

## 2022-12-14 RX ORDER — LOSARTAN POTASSIUM 50 MG/1
TABLET ORAL
COMMUNITY
Start: 2022-05-02

## 2022-12-14 NOTE — PROGRESS NOTES
OUTPATIENT CARDIOLOGY FOLLOW-UP    Name: Kalee Pompa    Age: 78 y.o. Date of Service: 12/14/2022    Chief Complaint: Follow-up for abnormal stress test, WALKER, chest pain    Referring Physician: Trell Kurtz MD    History of Present Illness:  Kalee Pompa is a 78 y.o. female who presents today for follow-up of prior abnormal stress tests (see results below), WALKER, and chest pain. She was previously a part time  at [employer] (retired). At the time of her prior office visit, she reported a > 1 year history of progressive fatigue, which she attributed to increased stress at home -- (24 year old granddaughter living with her, her  previously had prostate CA and lung CA) --> her  passed away in 5/2019, granddaughter initially went to college in [employer] [employer] [facility] and then [facility] [employer] (currently on academic suspension). Patient's energy level improved. She reports a ~ 7 month history of WALKER with moderate levels of exertion and exertional chest pain (\"discomfort\", mid-sternal, more frequent episodes recently). She denies a history of palpitations, syncope, PND, or orthopnea. She is currently with no active cardiac complaints at rest. SR on EKG. Review of Systems:  Cardiac: As per HPI  General: No fever, chills  Pulmonary: As per HPI  HEENT: No visual disturbances, difficult swallowing  GI: No nausea, vomiting  : No dysuria, hematuria  Endocrine: No thyroid disease or DM  Musculoskeletal: XIONG x 4, no focal motor deficits  Skin: Intact, no rashes  Neuro: No headache, seizures  Psych: Currently with no depression, anxiety    Past Medical History:  Past Medical History:   Diagnosis Date    ARJUN (cerebral atherosclerosis)     Hyperlipidemia     Hypertension        Past Surgical History:  Past Surgical History:   Procedure Laterality Date    COLONOSCOPY      TONSILLECTOMY       Family History:  History reviewed. No pertinent family history.     Social History:  Social History     Socioeconomic History    Marital status:      Spouse name: Not on file    Number of children: Not on file    Years of education: Not on file    Highest education level: Not on file   Occupational History    Not on file   Tobacco Use    Smoking status: Never    Smokeless tobacco: Never   Vaping Use    Vaping Use: Never used   Substance and Sexual Activity    Alcohol use: No    Drug use: No    Sexual activity: Not on file   Other Topics Concern    Not on file   Social History Narrative    Not on file     Social Determinants of Health     Financial Resource Strain: Not on file   Food Insecurity: Not on file   Transportation Needs: Not on file   Physical Activity: Not on file   Stress: Not on file   Social Connections: Not on file   Intimate Partner Violence: Not on file   Housing Stability: Not on file       Allergies: Allergies   Allergen Reactions    Lipitor [Atorvastatin]      Caused my bones to ache        Current Medications:  Current Outpatient Medications   Medication Sig Dispense Refill    losartan (COZAAR) 50 MG tablet       Cholecalciferol (VITAMIN D) 125 MCG (5000 UT) CAPS Take 5,000 Units by mouth daily      vitamin E 1000 units capsule Take 1,000 Units by mouth daily      Calcium Carb-Cholecalciferol (CALCIUM 1000 + D) 1000-800 MG-UNIT TABS Take by mouth daily      PROMETHAZINE HCL PO Take by mouth      simvastatin (ZOCOR) 40 MG tablet Take 40 mg by mouth nightly      ibuprofen (ADVIL;MOTRIN) 800 MG tablet Take 800 mg by mouth as needed       aspirin-acetaminophen-caffeine (EXCEDRIN MIGRAINE) 250-250-65 MG per tablet Take 1 tablet by mouth every 6 hours as needed for Headaches      zolpidem (AMBIEN) 10 MG tablet Take 10 mg by mouth nightly.        traZODone HCl 150 MG TB24 Take 75 mg by mouth nightly       amLODIPine (NORVASC) 10 MG tablet Take 1 tablet by mouth daily 10 tablet 0    amLODIPine (NORVASC) 10 MG tablet Take 1 tablet by mouth daily 90 tablet 3    MAGNESIUM CHLORIDE-CALCIUM PO Take by mouth daily DEXTROMETHORPHAN HBR PO Take by mouth      isosorbide mononitrate (IMDUR) 30 MG extended release tablet TAKE 1 TABLET DAILY 90 tablet 3     No current facility-administered medications for this visit. Physical Exam:  /78   Pulse 83   Resp 12   Ht 5' 4\" (1.626 m)   Wt 155 lb (70.3 kg)   BMI 26.61 kg/m²   Wt Readings from Last 3 Encounters:   12/14/22 155 lb (70.3 kg)   06/14/22 162 lb (73.5 kg)   01/13/22 159 lb 9.6 oz (72.4 kg)     Appearance: Awake, alert, no acute respiratory distress  Skin: Intact, no rash  Head: Normocephalic, atraumatic  Eyes: EOMI, no conjunctival erythema  ENMT: No pharyngeal erythema, MMM, no rhinorrhea  Neck: Supple, no elevated JVP, no carotid bruits  Lungs: Clear to auscultation bilaterally. No wheezes, rales, or rhonchi. Cardiac: Regular rate and rhythm, +S1S2, no murmurs apparent  Abdomen: Soft, nontender, +bowel sounds  Extremities: Moves all extremities x 4, no lower extremity edema  Neurologic: No focal motor deficits apparent, normal mood and affect    Cardiac Tests:  ECG: SR, rate 83, STT changes    Exercise nuclear stress test on 10/16/15 (Dr. Miladys Parisi):   Exercise time 6 minutes, 7 MET's, 2 mm upsloping/horizontal ST depression in leads II,III, aVF, V4-V6 with exercise and persisting 3 minutes into recovery period, no chest pain with exercise, small-sized mildly reversible defect in the mid/apical anterior walls, EF 65% with normal wall motion    Exercise nuclear stress test: 4/21/17 (Dr. John Cox)  1. Exercise EKG was positive with intermediate predictive value for ischemia. 2. The patient experienced no chest pain with exercise. 3. The myocardial perfusion imaging was abnormal.    The abnormality was a a small sized completely reversible defect in the distal anterior and apical wall suggesting distal LAD disease. 4. Overall left ventricular systolic function was normal without regional wall motion abnormalities.   5. Cabrales treadmill score was -7.5 implying intermediate risk. 6. Exercise capacity was average. 7. Intermediate risk general exercise nuclear cardiac perfusion examination. ASSESSMENT / PLAN:  Exertional chest pain  Dyspnea on exertion  Abnormal EKG  Abnormal stress test (10/2015 and 4/2017)   HTN -- controlled/intermittently elevated, BP today 138/78 132/76  HLD -- previously on lipitor (stopped d/t myalgias) --> tolerating simvastatin  Fatigue -- improved  Carotid artery disease -- follows with Dr. Gerald Palma    - R/B/A/I for cardiac catheterization discussed with the patient today and she agrees to proceed (indication 16, score 7) -- will check labs prior to procedure  - Toprol XL 25 mg daily added at 4/2021 office visit --> stopped in 5/2021 due to side effects  - No longer taking imdur  - Continue current medications otherwise (including ASA, statin, ARB, and norvasc)  - Continue home BP monitoring    Greater than 45 minutes was spent counseling the patient, reviewing the rationale for the above recommendations and reviewing the patient's current medication list, problem list and results of all previously ordered testing.     Aarti Carmona MD  [facility]) Cardiology

## 2022-12-24 ENCOUNTER — HOSPITAL ENCOUNTER (INPATIENT)
Age: 77
LOS: 16 days | Discharge: HOME HEALTH | DRG: 233 | End: 2023-01-09
Attending: EMERGENCY MEDICINE | Admitting: INTERNAL MEDICINE
Payer: MEDICARE

## 2022-12-24 ENCOUNTER — APPOINTMENT (OUTPATIENT)
Dept: GENERAL RADIOLOGY | Age: 77
DRG: 233 | End: 2022-12-24
Payer: MEDICARE

## 2022-12-24 DIAGNOSIS — G89.18 ACUTE POST-OPERATIVE PAIN: ICD-10-CM

## 2022-12-24 DIAGNOSIS — Z95.1 S/P CABG (CORONARY ARTERY BYPASS GRAFT): ICD-10-CM

## 2022-12-24 DIAGNOSIS — I21.4 NSTEMI (NON-ST ELEVATED MYOCARDIAL INFARCTION) (HCC): Primary | ICD-10-CM

## 2022-12-24 PROBLEM — R01.1 SYSTOLIC MURMUR: Status: ACTIVE | Noted: 2022-12-24

## 2022-12-24 PROBLEM — I50.9 ACUTE HEART FAILURE (HCC): Status: ACTIVE | Noted: 2022-12-24

## 2022-12-24 PROBLEM — R94.31 EKG, ABNORMAL: Status: ACTIVE | Noted: 2022-12-24

## 2022-12-24 PROBLEM — I77.9 CAROTID ARTERY DISEASE (HCC): Status: ACTIVE | Noted: 2022-12-24

## 2022-12-24 PROBLEM — R94.39 ABNORMAL NUCLEAR STRESS TEST: Status: ACTIVE | Noted: 2022-12-24

## 2022-12-24 PROBLEM — E78.5 HYPERLIPIDEMIA: Status: ACTIVE | Noted: 2022-12-24

## 2022-12-24 PROBLEM — I10 PRIMARY HYPERTENSION: Status: ACTIVE | Noted: 2022-12-24

## 2022-12-24 LAB
ALBUMIN SERPL-MCNC: 4.5 G/DL (ref 3.5–5.2)
ALP BLD-CCNC: 88 U/L (ref 35–104)
ALT SERPL-CCNC: 23 U/L (ref 0–32)
ANION GAP SERPL CALCULATED.3IONS-SCNC: 16 MMOL/L (ref 7–16)
APTT: 29.1 SEC (ref 24.5–35.1)
APTT: 77.3 SEC (ref 24.5–35.1)
AST SERPL-CCNC: 19 U/L (ref 0–31)
BASOPHILS ABSOLUTE: 0.03 E9/L (ref 0–0.2)
BASOPHILS RELATIVE PERCENT: 0.3 % (ref 0–2)
BILIRUB SERPL-MCNC: 1 MG/DL (ref 0–1.2)
BUN BLDV-MCNC: 20 MG/DL (ref 6–23)
CALCIUM SERPL-MCNC: 10.7 MG/DL (ref 8.6–10.2)
CHLORIDE BLD-SCNC: 107 MMOL/L (ref 98–107)
CO2: 19 MMOL/L (ref 22–29)
CREAT SERPL-MCNC: 0.9 MG/DL (ref 0.5–1)
EOSINOPHILS ABSOLUTE: 0.01 E9/L (ref 0.05–0.5)
EOSINOPHILS RELATIVE PERCENT: 0.1 % (ref 0–6)
GFR SERPL CREATININE-BSD FRML MDRD: >60 ML/MIN/1.73
GLUCOSE BLD-MCNC: 158 MG/DL (ref 74–99)
HCT VFR BLD CALC: 37 % (ref 34–48)
HCT VFR BLD CALC: 37.1 % (ref 34–48)
HEMOGLOBIN: 11.6 G/DL (ref 11.5–15.5)
HEMOGLOBIN: 11.8 G/DL (ref 11.5–15.5)
IMMATURE GRANULOCYTES #: 0.05 E9/L
IMMATURE GRANULOCYTES %: 0.6 % (ref 0–5)
INR BLD: 1.1
LYMPHOCYTES ABSOLUTE: 0.64 E9/L (ref 1.5–4)
LYMPHOCYTES RELATIVE PERCENT: 7.1 % (ref 20–42)
MCH RBC QN AUTO: 29 PG (ref 26–35)
MCH RBC QN AUTO: 29.3 PG (ref 26–35)
MCHC RBC AUTO-ENTMCNC: 31.4 % (ref 32–34.5)
MCHC RBC AUTO-ENTMCNC: 31.8 % (ref 32–34.5)
MCV RBC AUTO: 92.1 FL (ref 80–99.9)
MCV RBC AUTO: 92.5 FL (ref 80–99.9)
MONOCYTES ABSOLUTE: 0.33 E9/L (ref 0.1–0.95)
MONOCYTES RELATIVE PERCENT: 3.7 % (ref 2–12)
NEUTROPHILS ABSOLUTE: 7.97 E9/L (ref 1.8–7.3)
NEUTROPHILS RELATIVE PERCENT: 88.2 % (ref 43–80)
PDW BLD-RTO: 12.6 FL (ref 11.5–15)
PDW BLD-RTO: 12.6 FL (ref 11.5–15)
PLATELET # BLD: 297 E9/L (ref 130–450)
PLATELET # BLD: 321 E9/L (ref 130–450)
PMV BLD AUTO: 9.5 FL (ref 7–12)
PMV BLD AUTO: 9.6 FL (ref 7–12)
POTASSIUM SERPL-SCNC: 4.5 MMOL/L (ref 3.5–5)
PRO-BNP: 6073 PG/ML (ref 0–450)
PROTHROMBIN TIME: 11.5 SEC (ref 9.3–12.4)
RBC # BLD: 4 E12/L (ref 3.5–5.5)
RBC # BLD: 4.03 E12/L (ref 3.5–5.5)
SODIUM BLD-SCNC: 142 MMOL/L (ref 132–146)
TOTAL PROTEIN: 7.4 G/DL (ref 6.4–8.3)
TROPONIN, HIGH SENSITIVITY: 186 NG/L (ref 0–9)
TROPONIN, HIGH SENSITIVITY: 506 NG/L (ref 0–9)
WBC # BLD: 9 E9/L (ref 4.5–11.5)
WBC # BLD: 9.7 E9/L (ref 4.5–11.5)

## 2022-12-24 PROCEDURE — 71045 X-RAY EXAM CHEST 1 VIEW: CPT

## 2022-12-24 PROCEDURE — 2500000003 HC RX 250 WO HCPCS: Performed by: EMERGENCY MEDICINE

## 2022-12-24 PROCEDURE — 6370000000 HC RX 637 (ALT 250 FOR IP): Performed by: PHYSICIAN ASSISTANT

## 2022-12-24 PROCEDURE — 36415 COLL VENOUS BLD VENIPUNCTURE: CPT

## 2022-12-24 PROCEDURE — 83880 ASSAY OF NATRIURETIC PEPTIDE: CPT

## 2022-12-24 PROCEDURE — 99285 EMERGENCY DEPT VISIT HI MDM: CPT

## 2022-12-24 PROCEDURE — 85610 PROTHROMBIN TIME: CPT

## 2022-12-24 PROCEDURE — 6360000002 HC RX W HCPCS

## 2022-12-24 PROCEDURE — 85730 THROMBOPLASTIN TIME PARTIAL: CPT

## 2022-12-24 PROCEDURE — 6370000000 HC RX 637 (ALT 250 FOR IP)

## 2022-12-24 PROCEDURE — 6360000002 HC RX W HCPCS: Performed by: INTERNAL MEDICINE

## 2022-12-24 PROCEDURE — 85027 COMPLETE CBC AUTOMATED: CPT

## 2022-12-24 PROCEDURE — 99223 1ST HOSP IP/OBS HIGH 75: CPT | Performed by: INTERNAL MEDICINE

## 2022-12-24 PROCEDURE — 93005 ELECTROCARDIOGRAM TRACING: CPT | Performed by: EMERGENCY MEDICINE

## 2022-12-24 PROCEDURE — 2140000000 HC CCU INTERMEDIATE R&B

## 2022-12-24 PROCEDURE — 6370000000 HC RX 637 (ALT 250 FOR IP): Performed by: INTERNAL MEDICINE

## 2022-12-24 PROCEDURE — 2580000003 HC RX 258: Performed by: PHYSICIAN ASSISTANT

## 2022-12-24 PROCEDURE — 84484 ASSAY OF TROPONIN QUANT: CPT

## 2022-12-24 PROCEDURE — 96365 THER/PROPH/DIAG IV INF INIT: CPT

## 2022-12-24 PROCEDURE — 85025 COMPLETE CBC W/AUTO DIFF WBC: CPT

## 2022-12-24 PROCEDURE — 80053 COMPREHEN METABOLIC PANEL: CPT

## 2022-12-24 PROCEDURE — 96368 THER/DIAG CONCURRENT INF: CPT

## 2022-12-24 RX ORDER — HEPARIN SODIUM 10000 [USP'U]/100ML
5-30 INJECTION, SOLUTION INTRAVENOUS CONTINUOUS
Status: DISCONTINUED | OUTPATIENT
Start: 2022-12-24 | End: 2022-12-30

## 2022-12-24 RX ORDER — ZOLPIDEM TARTRATE 5 MG/1
10 TABLET ORAL NIGHTLY
Status: DISCONTINUED | OUTPATIENT
Start: 2022-12-24 | End: 2023-01-03

## 2022-12-24 RX ORDER — ALPRAZOLAM 0.25 MG/1
0.25 TABLET ORAL 3 TIMES DAILY PRN
Status: DISCONTINUED | OUTPATIENT
Start: 2022-12-24 | End: 2023-01-03

## 2022-12-24 RX ORDER — HEPARIN SODIUM 1000 [USP'U]/ML
2000 INJECTION, SOLUTION INTRAVENOUS; SUBCUTANEOUS PRN
Status: DISCONTINUED | OUTPATIENT
Start: 2022-12-24 | End: 2023-01-03

## 2022-12-24 RX ORDER — NITROGLYCERIN 0.4 MG/1
0.4 TABLET SUBLINGUAL EVERY 5 MIN PRN
Status: DISCONTINUED | OUTPATIENT
Start: 2022-12-24 | End: 2023-01-03

## 2022-12-24 RX ORDER — AMLODIPINE BESYLATE 10 MG/1
10 TABLET ORAL DAILY
Status: DISCONTINUED | OUTPATIENT
Start: 2022-12-24 | End: 2022-12-30

## 2022-12-24 RX ORDER — HEPARIN SODIUM 1000 [USP'U]/ML
4000 INJECTION, SOLUTION INTRAVENOUS; SUBCUTANEOUS ONCE
Status: COMPLETED | OUTPATIENT
Start: 2022-12-24 | End: 2022-12-24

## 2022-12-24 RX ORDER — HEPARIN SODIUM 10000 [USP'U]/100ML
INJECTION, SOLUTION INTRAVENOUS
Status: COMPLETED
Start: 2022-12-24 | End: 2022-12-24

## 2022-12-24 RX ORDER — ACETAMINOPHEN 325 MG/1
650 TABLET ORAL EVERY 6 HOURS PRN
Status: DISCONTINUED | OUTPATIENT
Start: 2022-12-24 | End: 2023-01-03

## 2022-12-24 RX ORDER — SIMVASTATIN 40 MG
40 TABLET ORAL NIGHTLY
Status: DISCONTINUED | OUTPATIENT
Start: 2022-12-24 | End: 2022-12-24 | Stop reason: CLARIF

## 2022-12-24 RX ORDER — MAGNESIUM SULFATE IN WATER 40 MG/ML
2000 INJECTION, SOLUTION INTRAVENOUS PRN
Status: DISCONTINUED | OUTPATIENT
Start: 2022-12-24 | End: 2023-01-03

## 2022-12-24 RX ORDER — ACETAMINOPHEN 650 MG/1
650 SUPPOSITORY RECTAL EVERY 6 HOURS PRN
Status: DISCONTINUED | OUTPATIENT
Start: 2022-12-24 | End: 2023-01-03

## 2022-12-24 RX ORDER — POTASSIUM CHLORIDE 7.45 MG/ML
10 INJECTION INTRAVENOUS PRN
Status: DISCONTINUED | OUTPATIENT
Start: 2022-12-24 | End: 2023-01-03

## 2022-12-24 RX ORDER — POLYETHYLENE GLYCOL 3350 17 G/17G
17 POWDER, FOR SOLUTION ORAL DAILY PRN
Status: DISCONTINUED | OUTPATIENT
Start: 2022-12-24 | End: 2023-01-03

## 2022-12-24 RX ORDER — NITROGLYCERIN 20 MG/100ML
5-200 INJECTION INTRAVENOUS CONTINUOUS
Status: DISCONTINUED | OUTPATIENT
Start: 2022-12-24 | End: 2022-12-28

## 2022-12-24 RX ORDER — ONDANSETRON 2 MG/ML
4 INJECTION INTRAMUSCULAR; INTRAVENOUS EVERY 6 HOURS PRN
Status: DISCONTINUED | OUTPATIENT
Start: 2022-12-24 | End: 2023-01-03

## 2022-12-24 RX ORDER — NITROGLYCERIN 0.4 MG/1
TABLET SUBLINGUAL
Status: COMPLETED
Start: 2022-12-24 | End: 2022-12-24

## 2022-12-24 RX ORDER — SIMVASTATIN 40 MG
40 TABLET ORAL NIGHTLY
Status: DISCONTINUED | OUTPATIENT
Start: 2022-12-24 | End: 2023-01-06

## 2022-12-24 RX ORDER — SODIUM CHLORIDE 9 MG/ML
INJECTION, SOLUTION INTRAVENOUS PRN
Status: DISCONTINUED | OUTPATIENT
Start: 2022-12-24 | End: 2023-01-03

## 2022-12-24 RX ORDER — FUROSEMIDE 10 MG/ML
20 INJECTION INTRAMUSCULAR; INTRAVENOUS ONCE
Status: COMPLETED | OUTPATIENT
Start: 2022-12-24 | End: 2022-12-24

## 2022-12-24 RX ORDER — SODIUM CHLORIDE 0.9 % (FLUSH) 0.9 %
5-40 SYRINGE (ML) INJECTION PRN
Status: DISCONTINUED | OUTPATIENT
Start: 2022-12-24 | End: 2023-01-03

## 2022-12-24 RX ORDER — SODIUM CHLORIDE 0.9 % (FLUSH) 0.9 %
5-40 SYRINGE (ML) INJECTION EVERY 12 HOURS SCHEDULED
Status: DISCONTINUED | OUTPATIENT
Start: 2022-12-24 | End: 2023-01-03

## 2022-12-24 RX ORDER — HEPARIN SODIUM 1000 [USP'U]/ML
INJECTION, SOLUTION INTRAVENOUS; SUBCUTANEOUS
Status: COMPLETED
Start: 2022-12-24 | End: 2022-12-24

## 2022-12-24 RX ORDER — HEPARIN SODIUM 1000 [USP'U]/ML
4000 INJECTION, SOLUTION INTRAVENOUS; SUBCUTANEOUS PRN
Status: DISCONTINUED | OUTPATIENT
Start: 2022-12-24 | End: 2023-01-03

## 2022-12-24 RX ORDER — POTASSIUM CHLORIDE 20 MEQ/1
40 TABLET, EXTENDED RELEASE ORAL PRN
Status: DISCONTINUED | OUTPATIENT
Start: 2022-12-24 | End: 2023-01-03

## 2022-12-24 RX ORDER — ASPIRIN 81 MG/1
81 TABLET, CHEWABLE ORAL DAILY
Status: DISCONTINUED | OUTPATIENT
Start: 2022-12-25 | End: 2023-01-03

## 2022-12-24 RX ORDER — ONDANSETRON 4 MG/1
4 TABLET, ORALLY DISINTEGRATING ORAL EVERY 8 HOURS PRN
Status: DISCONTINUED | OUTPATIENT
Start: 2022-12-24 | End: 2023-01-03

## 2022-12-24 RX ADMIN — ZOLPIDEM TARTRATE 10 MG: 5 TABLET ORAL at 23:36

## 2022-12-24 RX ADMIN — HEPARIN SODIUM 12 UNITS/KG/HR: 10000 INJECTION, SOLUTION INTRAVENOUS at 15:54

## 2022-12-24 RX ADMIN — METOPROLOL TARTRATE 25 MG: 25 TABLET, FILM COATED ORAL at 19:41

## 2022-12-24 RX ADMIN — HEPARIN SODIUM 4000 UNITS: 1000 INJECTION, SOLUTION INTRAVENOUS; SUBCUTANEOUS at 15:45

## 2022-12-24 RX ADMIN — FUROSEMIDE 20 MG: 10 INJECTION, SOLUTION INTRAMUSCULAR; INTRAVENOUS at 18:11

## 2022-12-24 RX ADMIN — HEPARIN SODIUM 4000 UNITS: 1000 INJECTION INTRAVENOUS; SUBCUTANEOUS at 15:45

## 2022-12-24 RX ADMIN — NITROGLYCERIN 0.4 MG: 0.4 TABLET, ORALLY DISINTEGRATING SUBLINGUAL at 15:00

## 2022-12-24 RX ADMIN — NITROGLYCERIN 5 MCG/MIN: 20 INJECTION INTRAVENOUS at 15:18

## 2022-12-24 RX ADMIN — ALPRAZOLAM 0.25 MG: 0.25 TABLET ORAL at 19:45

## 2022-12-24 RX ADMIN — NITROGLYCERIN 0.4 MG: 0.4 TABLET SUBLINGUAL at 15:05

## 2022-12-24 RX ADMIN — ACETAMINOPHEN 650 MG: 325 TABLET ORAL at 19:40

## 2022-12-24 RX ADMIN — NITROGLYCERIN 0.4 MG: 0.4 TABLET SUBLINGUAL at 15:00

## 2022-12-24 RX ADMIN — SODIUM CHLORIDE, PRESERVATIVE FREE 10 ML: 5 INJECTION INTRAVENOUS at 18:12

## 2022-12-24 RX ADMIN — ACETAMINOPHEN 650 MG: 325 TABLET ORAL at 23:36

## 2022-12-24 ASSESSMENT — PAIN DESCRIPTION - LOCATION
LOCATION: CHEST
LOCATION: HEAD
LOCATION: HEAD
LOCATION_2: CHEST
LOCATION: CHEST

## 2022-12-24 ASSESSMENT — PAIN DESCRIPTION - DESCRIPTORS
DESCRIPTORS: HEAVINESS
DESCRIPTORS: ACHING
DESCRIPTORS_2: ACHING
DESCRIPTORS: HEAVINESS
DESCRIPTORS: ACHING

## 2022-12-24 ASSESSMENT — PAIN SCALES - GENERAL
PAINLEVEL_OUTOF10: 6
PAINLEVEL_OUTOF10: 3
PAINLEVEL_OUTOF10: 4
PAINLEVEL_OUTOF10: 2
PAINLEVEL_OUTOF10: 7

## 2022-12-24 ASSESSMENT — PAIN DESCRIPTION - ORIENTATION
ORIENTATION: MID;LEFT
ORIENTATION: MID;LEFT

## 2022-12-24 ASSESSMENT — PAIN DESCRIPTION - INTENSITY: RATING_2: 1

## 2022-12-24 ASSESSMENT — PAIN - FUNCTIONAL ASSESSMENT: PAIN_FUNCTIONAL_ASSESSMENT: 0-10

## 2022-12-24 ASSESSMENT — PAIN DESCRIPTION - PAIN TYPE
TYPE: ACUTE PAIN
TYPE: ACUTE PAIN

## 2022-12-24 ASSESSMENT — PAIN DESCRIPTION - FREQUENCY: FREQUENCY: INTERMITTENT

## 2022-12-24 NOTE — CONSULTS
[facility] Cardiology consult  Dr. Nickie Dominguez      Reason for Consult: Chest pain  Requesting Physician: Khris Martinez MD  CHIEF COMPLAINT: Shortness of breath, chest pain  History Obtained From: patient  HISTORY OF PRESENT ILLNESS:     Patient is 78years old female with history of hypertension and hyperlipidemia who presented to the hospital today with chest pain and shortness of breath, patient was found to have elevated troponin, cardiology was consulted. As per patient patient has been having dyspnea on exertion and exertional chest pain since June, better with rest, symptoms have got worse over the last few weeks, yesterday chest pain was significantly worse, that prompted her to seek medical attention, now chest pain-free, still short of breath, denies any pedal edema, + easy fatigability, denies lightheadedness or diss, no PND, no orthopnea, no syncope, no presyncopal episodes.       Past Medical History:   Diagnosis Date    ARJUN (cerebral atherosclerosis)     Hyperlipidemia     Hypertension        Past Surgical History:   Procedure Laterality Date    COLONOSCOPY      TONSILLECTOMY           Current Facility-Administered Medications:     nitroGLYCERIN 50 mg in dextrose 5% 250 mL infusion, 5-200 mcg/min, IntraVENous, Continuous, Khris Martinez MD, Last Rate: 3 mL/hr at 12/24/22 1733, 10 mcg/min at 12/24/22 1733    nitroGLYCERIN (NITROSTAT) SL tablet 0.4 mg, 0.4 mg, SubLINGual, Q5 Min PRN, Khris Martinez MD, 0.4 mg at 12/24/22 1505    heparin (porcine) injection 4,000 Units, 4,000 Units, IntraVENous, PRN, Khris Martinez MD    heparin (porcine) injection 2,000 Units, 2,000 Units, IntraVENous, PRN, Khris Martinez MD    heparin 25,000 units in dextrose 5% 250 mL (premix) infusion, 5-30 Units/kg/hr, IntraVENous, Continuous, Khris Martinez MD, Last Rate: 8.4 mL/hr at 12/24/22 1554, 12 Units/kg/hr at 12/24/22 1554    amLODIPine (NORVASC) tablet 10 mg, 10 mg, Oral, Daily, TITUS Roger    zolpidem (AMBIEN) tablet 10 mg, 10 mg, Oral, Nightly, Shaneka Kim, PA    sodium chloride flush 0.9 % injection 5-40 mL, 5-40 mL, IntraVENous, 2 times per day, Shaneka Kim, PA    sodium chloride flush 0.9 % injection 5-40 mL, 5-40 mL, IntraVENous, PRN, Shaneka Kim, PA, 10 mL at 12/24/22 1812    0.9 % sodium chloride infusion, , IntraVENous, PRN, ITTUS Gaines    ondansetron (ZOFRAN-ODT) disintegrating tablet 4 mg, 4 mg, Oral, Q8H PRN **OR** ondansetron (ZOFRAN) injection 4 mg, 4 mg, IntraVENous, Q6H PRN, TITUS Gaines    acetaminophen (TYLENOL) tablet 650 mg, 650 mg, Oral, Q6H PRN **OR** acetaminophen (TYLENOL) suppository 650 mg, 650 mg, Rectal, Q6H PRN, TITUS Gaines    polyethylene glycol (GLYCOLAX) packet 17 g, 17 g, Oral, Daily PRN, TITUS Gaines    [START ON 12/25/2022] aspirin chewable tablet 81 mg, 81 mg, Oral, Daily, TITUS Gaines    nitroGLYCERIN (NITROSTAT) SL tablet 0.4 mg, 0.4 mg, SubLINGual, Q5 Min PRN, TITUS Gaines    magnesium sulfate 2000 mg in 50 mL IVPB premix, 2,000 mg, IntraVENous, PRN, Shaneka Kim, PA    potassium chloride (KLOR-CON M) extended release tablet 40 mEq, 40 mEq, Oral, PRN **OR** potassium bicarb-citric acid (EFFER-K) effervescent tablet 40 mEq, 40 mEq, Oral, PRN **OR** potassium chloride 10 mEq/100 mL IVPB (Peripheral Line), 10 mEq, IntraVENous, PRN, TITUS Gaines    simvastatin (ZOCOR) tablet 40 mg (Patient Supplied), 40 mg, Oral, Nightly, Shaneka Kim, PA    Allergies as of 12/24/2022 - Fully Reviewed 12/24/2022   Allergen Reaction Noted    Lipitor [atorvastatin]  04/21/2017       Social History     Socioeconomic History    Marital status:      Spouse name: Not on file    Number of children: Not on file    Years of education: Not on file    Highest education level: Not on file   Occupational History    Not on file   Tobacco Use    Smoking status: Never    Smokeless tobacco: Never   Vaping Use    Vaping Use: Never used   Substance  and Sexual Activity    Alcohol use: No    Drug use: No    Sexual activity: Not on file   Other Topics Concern    Not on file   Social History Narrative    Not on file     Social Determinants of Health     Financial Resource Strain: Not on file   Food Insecurity: Not on file   Transportation Needs: Not on file   Physical Activity: Not on file   Stress: Not on file   Social Connections: Not on file   Intimate Partner Violence: Not on file   Housing Stability: Not on file       No family history of early CAD    REVIEW OF SYSTEMS:     CONSTITUTIONAL:  negative for  fevers, chills, sweats, + fatigue  EYES:  negative for  double vision, blurred vision and blind spots  HEENT:  negative for  tinnitus, earaches, nasal congestion and epistaxis  RESPIRATORY:  negative for  dry cough, cough with sputum, wheezing and hemoptysis  CARDIOVASCULAR: as per HPI  GASTROINTESTINAL:  negative for nausea, vomiting, diarrhea, constipation, pruritus and jaundice  GENITOURINARY:  negative for frequency, dysuria, nocturia, urinary incontinence and hesitancy  HEMATOLOGIC/LYMPHATIC:  negative for easy bruising, bleeding, lymphadenopathy and petechiae  ALLERGIC/IMMUNOLOGIC:  negative for urticaria, hay fever and angioedema  ENDOCRINE:  negative for heat intolerance, cold intolerance, tremor, hair loss and diabetic symptoms including neither polyuria nor polydipsia nor blurred vision  MUSCULOSKELETAL:  negative for  myalgias, arthralgias, joint swelling, stiff joints and decreased range of motion  NEUROLOGICAL:  negative for memory problems, speech problems, visual disturbance, dysphagia, weakness and numbness      PHYSICAL EXAM:   CONSTITUTIONAL:  awake, alert, cooperative, no apparent distress, and appears stated age  EYES:  lids and lashes normal, anicteric sclerae  HEAD:  normocepalic, without obvious abnormality, atraumatic, pink, moist mucous membranes.   NECK:  Supple, symmetrical, trachea midline, no adenopathy, thyroid symmetric, not enlarged and no tenderness, skin normal  HEMATOLOGIC/LYMPHATICS:  no cervical lymphadenopathy and no supraclavicular lymphadenopathy  LUNGS:  No increased work of breathing,  No accessory muscle use or intercostal retractions, good air exchange, basilar rales CARDIOVASCULAR:  Normal apical impulse, regular rate and rhythm, normal S1 and S2, no S3 or S4, 3/6 systolic murmur at the apex, 2 out of systolic ejection murmur at the right upper sternal border, + JVD, no carotid bruit, no pedal edema, good carotid upstroke bilaterally. ABDOMEN:  Soft, nontender, no masses, no hepatomegaly or splenomegaly, BS+  CHEST: nontender to palpation, expands symmetrically  MUSCULOSKELETAL:  No clubbing no cyanosis. there is no redness, warmth, or swelling of the joints  full range of motion noted  NEUROLOGIC:  Alert, awake,oriented x3. SKIN:  no bruising or bleeding, normal skin color, texture, turgor and no redness, warmth, or swelling    BP (!) 156/88   Pulse 100   Temp 98.1 °F (36.7 °C) (Oral)   Resp 18   Ht 5' 4\" (1.626 m)   Wt 155 lb (70.3 kg)   SpO2 98%   BMI 26.61 kg/m²     DATA:   I personally reviewed the admission EKG with the following interpretation: 12/14/2022, sinus rhythm, ST-T changes in the inferior lateral leads    ECHO: Not performed to date    Stress Test: 4/21/2017,Impression:    Exercise EKG was positive with intermediate predictive value for ischemia. The patient experienced no chest pain with exercise. The myocardial perfusion imaging was abnormal.    The abnormality was a a small sized completely reversible defect in the distal anterior and apical wall suggesting distal LAD disease. Overall left ventricular systolic function was normal without regional wall motion abnormalities. Cabrales treadmill score was -7.5 implying intermediate risk. Exercise capacity was average. Intermediate risk general exercise nuclear cardiac perfusion examination.   Angiography: Not performed to date  Cardiology Labs: BMP:    Lab Results   Component Value Date/Time     12/24/2022 03:02 PM    K 4.5 12/24/2022 03:02 PM     12/24/2022 03:02 PM    CO2 19 12/24/2022 03:02 PM    BUN 20 12/24/2022 03:02 PM     CMP:    Lab Results   Component Value Date/Time     12/24/2022 03:02 PM    K 4.5 12/24/2022 03:02 PM     12/24/2022 03:02 PM    CO2 19 12/24/2022 03:02 PM    BUN 20 12/24/2022 03:02 PM    PROT 7.4 12/24/2022 03:02 PM     CBC:    Lab Results   Component Value Date/Time    WBC 9.0 12/24/2022 03:02 PM    RBC 4.00 12/24/2022 03:02 PM    HGB 11.6 12/24/2022 03:02 PM    HCT 37.0 12/24/2022 03:02 PM    MCV 92.5 12/24/2022 03:02 PM    RDW 12.6 12/24/2022 03:02 PM     12/24/2022 03:02 PM     PT/INR:  No results found for: PTINR  PT/INR Warfarin:  No components found for: PTPATWAR, PTINRWAR  PTT:    Lab Results   Component Value Date/Time    APTT 29.1 12/24/2022 03:02 PM     PTT Heparin:  No components found for: APTTHEP  Magnesium:  No results found for: MG  TSH:  No results found for: TSH  TROPONIN:  No components found for: TROP  BNP:  No results found for: BNP  FASTING LIPID PANEL:  No results found for: CHOL, HDL, TRIG  XR CHEST PORTABLE   Final Result   Cardiomegaly with  patchy perihilar and bibasilar infiltrates and effusions   likely CHF/edema and or pneumonia.      Soft tissue prominence in the right hilum.  Consider surveillance preferably   by CT scan.           I have personally reviewed the laboratory, cardiac diagnostic and radiographic testing as outlined above:      IMPRESSION:  1.  Non-ST elevation MI: Now chest pain-free, will continue IV heparin, aspirin, beta-blocker and statin, serial cardiac enzymes, echocardiogram, was scheduled for outpatient cardiac catheterization on January 3, given her presentation of non-ST elevation MI, will perform cardiac catheterization during this admission   2.  Acute heart failure: Systolic versus diastolic?, ischemic versus nonischemic?,  Will  deion, will check echocardiogram.    3.  Systolic murmur: Consistent with mitral valve regurgitation: We will check echocardiogram   4. Abnormal EKG: As above   5. History of abnormal stress test   6. Hypertension: Controlled   7. Hyperlipidemia: Simvastatin   8. Carotid artery disease     RECOMMENDATIONS:   1.  IV heparin, nitroglycerin, aspirin beta-blocker continue statin  2. Lasix 20 mg IV once  3.  Echocardiogram  4. Serial cardiac enzymes  5. Cardiac catheterization this week: Indications, procedures, risks and benefits of cardiac catheterization were discussed with the patient with risks including, but not limited to, infection, vessel damage, bleeding, dye allergy, nephrotoxicity, dysrhythmia, MI, CVA and death as well as the potential need for emergent cardiac surgery. Patient verbalized understanding and is agreeable to proceed. (AUC 3/8)  6. Further cardiac recommendations will be forthcoming pending her clinical course and diagnostic test findings    I have reviewed my findings and recommendations with patient    Thank you for the consult  Electronically signed by Mal Toro MD on 12/24/2022 at 6:21 PM  NOTE: This report was transcribed using voice recognition software.  Every effort was made to ensure accuracy; however, inadvertent computerized transcription errors may be present

## 2022-12-24 NOTE — ED PROVIDER NOTES
Department of Emergency Medicine   ED  Provider Note  Admit Date/RoomTime: 12/24/2022  2:40 PM  ED Room: 06/06          History of Present Illness:  12/24/22, Time: 5:22 PM EST  Chief Complaint   Patient presents with    Chest Pain     Pt sent in from [place] urgent care. Mid sternal chest pressure. 324mg ASA pta                Prema Powers is a 78 y.o. female presenting to the ED for chest pain. Patient's been a substernal chest heaviness, radiates to the left side, since yesterday. Came on gradually, nothing makes it better or worse, has been intermittent in nature. States it was severe yesterday, calmed down, but returned today. She was at urgent care was transferred here. She sees cardiology, they are arranging for an outpatient catheterization. She is on no anticoagulation. Denies any fever, chills, nausea, vomiting, cough, sputum, paresthesias, or any other symptoms or complaints. Review of Systems:   Pertinent positives and negatives are stated within HPI, all other systems reviewed and are negative.        --------------------------------------------- PAST HISTORY ---------------------------------------------  Past Medical History:  has a past medical history of ARJUN (cerebral atherosclerosis), Hyperlipidemia, and Hypertension. Past Surgical History:  has a past surgical history that includes Tonsillectomy and Colonoscopy. Social History:  reports that she has never smoked. She has never used smokeless tobacco. She reports that she does not drink alcohol and does not use drugs. Family History: family history is not on file. . Unless otherwise noted, family history is non contributory    The patients home medications have been reviewed.     Allergies: Lipitor [atorvastatin]        ---------------------------------------------------PHYSICAL EXAM--------------------------------------    Constitutional/General: Alert and oriented x3  Head: Normocephalic and atraumatic  Eyes: PERRL, EOMI, sclera non icteric  Mouth: Oropharynx clear, handling secretions, no trismus, no asymmetry of the posterior oropharynx or uvular edema  Neck: Supple, full ROM, no stridor, no meningeal signs  Respiratory: Lungs clear to auscultation bilaterally, no wheezes, rales, or rhonchi. Not in respiratory distress  Cardiovascular:  Regular tachycardia. Regular rhythm. 2+ distal pulses. Equal extremity pulses. Chest: No chest wall tenderness  GI:  Abdomen Soft, Non tender, Non distended. No rebound, guarding, or rigidity. No pulsatile masses. Musculoskeletal: Moves all extremities x 4. Warm and well perfused, no clubbing, cyanosis, or edema. Capillary refill <3 seconds  Integument: skin warm and dry. No rashes. Neurologic: GCS 15, no focal deficits, symmetric strength 5/5 in the upper and lower extremities bilaterally  Psychiatric: Normal Affect          -------------------------------------------------- RESULTS -------------------------------------------------  I have personally reviewed all laboratory and imaging results for this patient. Results are listed below.      LABS: (Lab results interpreted by me)  Results for orders placed or performed during the hospital encounter of 12/24/22   CBC with Auto Differential   Result Value Ref Range    WBC 9.0 4.5 - 11.5 E9/L    RBC 4.00 3.50 - 5.50 E12/L    Hemoglobin 11.6 11.5 - 15.5 g/dL    Hematocrit 37.0 34.0 - 48.0 %    MCV 92.5 80.0 - 99.9 fL    MCH 29.0 26.0 - 35.0 pg    MCHC 31.4 (L) 32.0 - 34.5 %    RDW 12.6 11.5 - 15.0 fL    Platelets 280 305 - 476 E9/L    MPV 9.5 7.0 - 12.0 fL    Neutrophils % 88.2 (H) 43.0 - 80.0 %    Immature Granulocytes % 0.6 0.0 - 5.0 %    Lymphocytes % 7.1 (L) 20.0 - 42.0 %    Monocytes % 3.7 2.0 - 12.0 %    Eosinophils % 0.1 0.0 - 6.0 %    Basophils % 0.3 0.0 - 2.0 %    Neutrophils Absolute 7.97 (H) 1.80 - 7.30 E9/L    Immature Granulocytes # 0.05 E9/L    Lymphocytes Absolute 0.64 (L) 1.50 - 4.00 E9/L    Monocytes Absolute 0.33 0.10 - 0.95 E9/L Eosinophils Absolute 0.01 (L) 0.05 - 0.50 E9/L    Basophils Absolute 0.03 0.00 - 0.20 E9/L   Comprehensive Metabolic Panel   Result Value Ref Range    Sodium 142 132 - 146 mmol/L    Potassium 4.5 3.5 - 5.0 mmol/L    Chloride 107 98 - 107 mmol/L    CO2 19 (L) 22 - 29 mmol/L    Anion Gap 16 7 - 16 mmol/L    Glucose 158 (H) 74 - 99 mg/dL    BUN 20 6 - 23 mg/dL    Creatinine 0.9 0.5 - 1.0 mg/dL    Est, Glom Filt Rate >60 >=60 mL/min/1.73    Calcium 10.7 (H) 8.6 - 10.2 mg/dL    Total Protein 7.4 6.4 - 8.3 g/dL    Albumin 4.5 3.5 - 5.2 g/dL    Total Bilirubin 1.0 0.0 - 1.2 mg/dL    Alkaline Phosphatase 88 35 - 104 U/L    ALT 23 0 - 32 U/L    AST 19 0 - 31 U/L   Troponin   Result Value Ref Range    Troponin, High Sensitivity 186 (H) 0 - 9 ng/L   Brain Natriuretic Peptide   Result Value Ref Range    Pro-BNP 6,073 (H) 0 - 450 pg/mL   APTT   Result Value Ref Range    aPTT 29.1 24.5 - 35.1 sec   Protime-INR   Result Value Ref Range    Protime 11.5 9.3 - 12.4 sec    INR 1.1    ,       RADIOLOGY:  Interpreted by Radiologist unless otherwise specified  XR CHEST PORTABLE   Final Result   Cardiomegaly with  patchy perihilar and bibasilar infiltrates and effusions   likely CHF/edema and or pneumonia. Soft tissue prominence in the right hilum. Consider surveillance preferably   by CT scan. EKG Interpretation  Interpreted by emergency department physician, Dr. Pino Reed, rate 106, ST depressions diffusely, no STEMI      ------------------------- NURSING NOTES AND VITALS REVIEWED ---------------------------   The nursing notes within the ED encounter and vital signs as below have been reviewed by myself  BP (!) 151/88   Pulse (!) 102   Temp 98.2 °F (36.8 °C)   Resp 18   Wt 155 lb (70.3 kg)   SpO2 98%   BMI 26.61 kg/m²     Oxygen Saturation Interpretation: Normal    The patients available past medical records and past encounters were reviewed.         ------------------------------ ED COURSE/MEDICAL DECISION MAKING----------------------  Medications   nitroGLYCERIN 50 mg in dextrose 5% 250 mL infusion (5 mcg/min IntraVENous New Bag 12/24/22 1518)   nitroGLYCERIN (NITROSTAT) SL tablet 0.4 mg (0.4 mg SubLINGual Given by Other 12/24/22 1505)   heparin (porcine) injection 4,000 Units (has no administration in time range)   heparin (porcine) injection 2,000 Units (has no administration in time range)   heparin 25,000 units in dextrose 5% 250 mL (premix) infusion (12 Units/kg/hr × 70.3 kg IntraVENous New Bag 12/24/22 1554)   furosemide (LASIX) injection 20 mg (has no administration in time range)   heparin (porcine) injection 4,000 Units (4,000 Units IntraVENous Given 12/24/22 1545)           The cardiac monitor revealed sinus with a heart rate in the 100s as interpreted by me. The cardiac monitor was ordered secondary to the patient's CP and to monitor the patient for dysrhythmia. CPT [id number]         Medical Decision Making:    Patient presents with a concerning chest pain and presentation. Received sublingual nitro, IV heparin, along with IV nitro. Labs and imaging reviewed. Troponin elevated. X-ray shows CHF. BNP elevated. Reevaluation, patient's resting. Pain is resolving. She has no symptoms or complaints. Cardiology evaluated bedside, after their evaluation, no emergent catheterization at this time. Discussed with the hospitalist, patient will be admitted. Please note that the withdrawal or failure to initiate urgent interventions for this patient would likely result in a life threatening deterioration or permanent disability. Accordingly this patient received 30 minutes of critical care time, excluding separately billable procedures. Counseling: The emergency provider has spoken with the patient and discussed todays results, in addition to providing specific details for the plan of care and counseling regarding the diagnosis and prognosis.   Questions are answered at this time and they are agreeable with the plan.       --------------------------------- IMPRESSION AND DISPOSITION ---------------------------------    IMPRESSION  1. NSTEMI (non-ST elevated myocardial infarction) ([facility]        DISPOSITION  Disposition: Admit to telemetry  Patient condition is stable        NOTE: This report was transcribed using voice recognition software.  Every effort was made to ensure accuracy; however, inadvertent computerized transcription errors may be present        Kristen Cevallos MD  12/24/22 7054

## 2022-12-24 NOTE — PLAN OF CARE
Problem: Discharge Planning  Goal: Discharge to home or other facility with appropriate resources  Outcome: Progressing  Flowsheets (Taken 12/24/2022 1817)  Discharge to home or other facility with appropriate resources: Identify barriers to discharge with patient and caregiver     Problem: Pain  Goal: Verbalizes/displays adequate comfort level or baseline comfort level  Outcome: Progressing  Flowsheets (Taken 12/24/2022 1756)  Verbalizes/displays adequate comfort level or baseline comfort level: Assess pain using appropriate pain scale     Problem: ABCDS Injury Assessment  Goal: Absence of physical injury  Outcome: Progressing

## 2022-12-25 LAB
ANION GAP SERPL CALCULATED.3IONS-SCNC: 16 MMOL/L (ref 7–16)
APTT: 52.4 SEC (ref 24.5–35.1)
BASOPHILS ABSOLUTE: 0.05 E9/L (ref 0–0.2)
BASOPHILS RELATIVE PERCENT: 0.7 % (ref 0–2)
BUN BLDV-MCNC: 21 MG/DL (ref 6–23)
CALCIUM SERPL-MCNC: 10 MG/DL (ref 8.6–10.2)
CHLORIDE BLD-SCNC: 108 MMOL/L (ref 98–107)
CHOLESTEROL, TOTAL: 147 MG/DL (ref 0–199)
CO2: 21 MMOL/L (ref 22–29)
CREAT SERPL-MCNC: 1 MG/DL (ref 0.5–1)
EOSINOPHILS ABSOLUTE: 0.13 E9/L (ref 0.05–0.5)
EOSINOPHILS RELATIVE PERCENT: 1.8 % (ref 0–6)
GFR SERPL CREATININE-BSD FRML MDRD: 59 ML/MIN/1.73
GLUCOSE BLD-MCNC: 106 MG/DL (ref 74–99)
HCT VFR BLD CALC: 32 % (ref 34–48)
HDLC SERPL-MCNC: 63 MG/DL
HEMOGLOBIN: 10.3 G/DL (ref 11.5–15.5)
IMMATURE GRANULOCYTES #: 0.03 E9/L
IMMATURE GRANULOCYTES %: 0.4 % (ref 0–5)
LDL CHOLESTEROL CALCULATED: 61 MG/DL (ref 0–99)
LYMPHOCYTES ABSOLUTE: 1.66 E9/L (ref 1.5–4)
LYMPHOCYTES RELATIVE PERCENT: 22.6 % (ref 20–42)
MCH RBC QN AUTO: 29.6 PG (ref 26–35)
MCHC RBC AUTO-ENTMCNC: 32.2 % (ref 32–34.5)
MCV RBC AUTO: 92 FL (ref 80–99.9)
MONOCYTES ABSOLUTE: 0.6 E9/L (ref 0.1–0.95)
MONOCYTES RELATIVE PERCENT: 8.2 % (ref 2–12)
NEUTROPHILS ABSOLUTE: 4.89 E9/L (ref 1.8–7.3)
NEUTROPHILS RELATIVE PERCENT: 66.3 % (ref 43–80)
PDW BLD-RTO: 12.7 FL (ref 11.5–15)
PLATELET # BLD: 255 E9/L (ref 130–450)
PMV BLD AUTO: 9.9 FL (ref 7–12)
POTASSIUM REFLEX MAGNESIUM: 4.2 MMOL/L (ref 3.5–5)
RBC # BLD: 3.48 E12/L (ref 3.5–5.5)
SODIUM BLD-SCNC: 145 MMOL/L (ref 132–146)
TRIGL SERPL-MCNC: 113 MG/DL (ref 0–149)
TROPONIN, HIGH SENSITIVITY: 979 NG/L (ref 0–9)
VLDLC SERPL CALC-MCNC: 23 MG/DL
WBC # BLD: 7.4 E9/L (ref 4.5–11.5)

## 2022-12-25 PROCEDURE — 80061 LIPID PANEL: CPT

## 2022-12-25 PROCEDURE — 6370000000 HC RX 637 (ALT 250 FOR IP): Performed by: INTERNAL MEDICINE

## 2022-12-25 PROCEDURE — 84484 ASSAY OF TROPONIN QUANT: CPT

## 2022-12-25 PROCEDURE — 6370000000 HC RX 637 (ALT 250 FOR IP): Performed by: PHYSICIAN ASSISTANT

## 2022-12-25 PROCEDURE — 2140000000 HC CCU INTERMEDIATE R&B

## 2022-12-25 PROCEDURE — 85025 COMPLETE CBC W/AUTO DIFF WBC: CPT

## 2022-12-25 PROCEDURE — 80048 BASIC METABOLIC PNL TOTAL CA: CPT

## 2022-12-25 PROCEDURE — 6360000002 HC RX W HCPCS: Performed by: EMERGENCY MEDICINE

## 2022-12-25 PROCEDURE — 99233 SBSQ HOSP IP/OBS HIGH 50: CPT | Performed by: INTERNAL MEDICINE

## 2022-12-25 PROCEDURE — 2580000003 HC RX 258: Performed by: PHYSICIAN ASSISTANT

## 2022-12-25 PROCEDURE — 36415 COLL VENOUS BLD VENIPUNCTURE: CPT

## 2022-12-25 PROCEDURE — 85730 THROMBOPLASTIN TIME PARTIAL: CPT

## 2022-12-25 RX ADMIN — ASPIRIN 81 MG CHEWABLE TABLET 81 MG: 81 TABLET CHEWABLE at 09:35

## 2022-12-25 RX ADMIN — METOPROLOL TARTRATE 25 MG: 25 TABLET, FILM COATED ORAL at 09:36

## 2022-12-25 RX ADMIN — HEPARIN SODIUM 12 UNITS/KG/HR: 10000 INJECTION, SOLUTION INTRAVENOUS at 22:30

## 2022-12-25 RX ADMIN — METOPROLOL TARTRATE 25 MG: 25 TABLET, FILM COATED ORAL at 19:53

## 2022-12-25 RX ADMIN — ACETAMINOPHEN 650 MG: 325 TABLET ORAL at 12:04

## 2022-12-25 RX ADMIN — ACETAMINOPHEN 650 MG: 325 TABLET ORAL at 19:52

## 2022-12-25 RX ADMIN — ZOLPIDEM TARTRATE 10 MG: 5 TABLET ORAL at 22:27

## 2022-12-25 RX ADMIN — SODIUM CHLORIDE, PRESERVATIVE FREE 10 ML: 5 INJECTION INTRAVENOUS at 19:54

## 2022-12-25 RX ADMIN — ALPRAZOLAM 0.25 MG: 0.25 TABLET ORAL at 09:35

## 2022-12-25 ASSESSMENT — PAIN DESCRIPTION - FREQUENCY: FREQUENCY: INTERMITTENT

## 2022-12-25 ASSESSMENT — PAIN SCALES - GENERAL
PAINLEVEL_OUTOF10: 8
PAINLEVEL_OUTOF10: 0
PAINLEVEL_OUTOF10: 8
PAINLEVEL_OUTOF10: 0
PAINLEVEL_OUTOF10: 8
PAINLEVEL_OUTOF10: 2

## 2022-12-25 ASSESSMENT — PAIN DESCRIPTION - ORIENTATION
ORIENTATION: LEFT
ORIENTATION: MID

## 2022-12-25 ASSESSMENT — PAIN DESCRIPTION - PAIN TYPE: TYPE: ACUTE PAIN

## 2022-12-25 ASSESSMENT — PAIN - FUNCTIONAL ASSESSMENT: PAIN_FUNCTIONAL_ASSESSMENT: ACTIVITIES ARE NOT PREVENTED

## 2022-12-25 ASSESSMENT — PAIN DESCRIPTION - DIRECTION: RADIATING_TOWARDS: NONRADIATING

## 2022-12-25 ASSESSMENT — PAIN DESCRIPTION - DESCRIPTORS
DESCRIPTORS: DULL;DISCOMFORT;ACHING
DESCRIPTORS: ACHING;DULL;OTHER (COMMENT)
DESCRIPTORS: ACHING

## 2022-12-25 ASSESSMENT — PAIN DESCRIPTION - LOCATION
LOCATION: HEAD
LOCATION: CHEST

## 2022-12-25 NOTE — H&P
[facility]  History and Physical      CHIEF COMPLAINT:    Chief Complaint   Patient presents with    Chest Pain     Pt sent in from [place] urgent care. Mid sternal chest pressure. 324mg ASA pta        Patient of Jaydon Fonseca MD presents with:  NSTEMI (non-ST elevated myocardial infarction) [facility])    History of Present Illness:   Patient is a 51-year-old female with a past medical history of ARJUN, hyperlipidemia and hypertension. Patient presented to ED with complaints of chest pain and shortness of breath was found to have an elevated troponin. Patient admits that her chest pain was severe however calm down prior to the ED. Patient has been following with a cardiologist and arrangements are being made for outpatient catheterization. ER work-up revealed elevated troponin 186, BNP 6073. Patient is alert and oriented on examination. Patient currently denies any chest pain, and shortness of breath. Patient was started on a heparin drip cardiology was consulted patient is admitted for further testing and treatment. REVIEW OF SYSTEMS:  Pertinent negatives are above in HPI. 10 point ROS otherwise negative.       Past Medical History:   Diagnosis Date    ARJUN (cerebral atherosclerosis)     Hyperlipidemia     Hypertension          Past Surgical History:   Procedure Laterality Date    COLONOSCOPY      TONSILLECTOMY         Medications Prior to Admission:    Medications Prior to Admission: losartan (COZAAR) 50 MG tablet, Take 50 mg by mouth at bedtime  amLODIPine (NORVASC) 10 MG tablet, Take 1 tablet by mouth daily (Patient not taking: Reported on 12/24/2022)  amLODIPine (NORVASC) 10 MG tablet, Take 1 tablet by mouth daily (Patient not taking: Reported on 12/24/2022)  Cholecalciferol (VITAMIN D) 125 MCG (5000 UT) CAPS, Take 5,000 Units by mouth daily (Patient not taking: Reported on 12/24/2022)  vitamin E 1000 units capsule, Take 1,000 Units by mouth daily  Calcium Carb-Cholecalciferol (CALCIUM 1000 + D) 1000-800 MG-UNIT TABS, Take by mouth daily (Patient not taking: Reported on 12/24/2022)  MAGNESIUM CHLORIDE-CALCIUM PO, Take by mouth daily (Patient not taking: Reported on 12/24/2022)  PROMETHAZINE HCL PO, Take by mouth as needed  DEXTROMETHORPHAN HBR PO, Take by mouth as needed  simvastatin (ZOCOR) 40 MG tablet, Take 40 mg by mouth nightly  isosorbide mononitrate (IMDUR) 30 MG extended release tablet, TAKE 1 TABLET DAILY (Patient not taking: Reported on 12/24/2022)  ibuprofen (ADVIL;MOTRIN) 800 MG tablet, Take 800 mg by mouth as needed   aspirin-acetaminophen-caffeine (EXCEDRIN MIGRAINE) 250-250-65 MG per tablet, Take 1 tablet by mouth every 6 hours as needed for Headaches  zolpidem (AMBIEN) 10 MG tablet, Take 10 mg by mouth nightly. traZODone HCl 150 MG TB24, Take 75 mg by mouth nightly     Note that the patient's home medications were reviewed and the above list is accurate to the best of my knowledge at the time of the exam.    Allergies:    Lipitor [atorvastatin]    Social History:    reports that she has never smoked. She has never used smokeless tobacco. She reports that she does not drink alcohol and does not use drugs. Family History:   Unable to obtain at this time. PHYSICAL EXAM:    Vitals:  /74   Pulse 97   Temp 97.9 °F (36.6 °C)   Resp 16   Ht 5' 4\" (1.626 m)   Wt 150 lb 12.8 oz (68.4 kg)   SpO2 96%   BMI 25.88 kg/m²       General appearance: NAD, conversant  Eyes: Sclerae anicteric, PERRLA  HEENT: AT/NC, MMM  Neck: FROM, supple, no thyromegaly  Lymph: No cervical / supraclavicular lymphadenopathy  Lungs: Clear to auscultation, WOB normal  CV: RRR, no MRGs, no lower extremity edema  Abdomen: Soft, non-tender; no masses or HSM, +BS  Extremities: FROM without synovitis. No clubbing or cyanosis of the hands. Skin: no rash, induration, lesions, or ulcers  Psych: Calm and cooperative. Normal judgement and insight. Normal mood and affect.   Neuro: Alert and interactive, face symmetric, speech fluent. LABS:  All labs reviewed. Of note:  CBC with Differential:    Lab Results   Component Value Date/Time    WBC 7.4 12/25/2022 05:13 AM    RBC 3.48 12/25/2022 05:13 AM    HGB 10.3 12/25/2022 05:13 AM    HCT 32.0 12/25/2022 05:13 AM     12/25/2022 05:13 AM    MCV 92.0 12/25/2022 05:13 AM    MCH 29.6 12/25/2022 05:13 AM    MCHC 32.2 12/25/2022 05:13 AM    RDW 12.7 12/25/2022 05:13 AM    LYMPHOPCT 22.6 12/25/2022 05:13 AM    MONOPCT 8.2 12/25/2022 05:13 AM    BASOPCT 0.7 12/25/2022 05:13 AM    MONOSABS 0.60 12/25/2022 05:13 AM    LYMPHSABS 1.66 12/25/2022 05:13 AM    EOSABS 0.13 12/25/2022 05:13 AM    BASOSABS 0.05 12/25/2022 05:13 AM     CMP:    Lab Results   Component Value Date/Time     12/24/2022 03:02 PM    K 4.5 12/24/2022 03:02 PM     12/24/2022 03:02 PM    CO2 19 12/24/2022 03:02 PM    BUN 20 12/24/2022 03:02 PM    CREATININE 0.9 12/24/2022 03:02 PM    LABGLOM >60 12/24/2022 03:02 PM    GLUCOSE 158 12/24/2022 03:02 PM    PROT 7.4 12/24/2022 03:02 PM    LABALBU 4.5 12/24/2022 03:02 PM    CALCIUM 10.7 12/24/2022 03:02 PM    BILITOT 1.0 12/24/2022 03:02 PM    ALKPHOS 88 12/24/2022 03:02 PM    AST 19 12/24/2022 03:02 PM    ALT 23 12/24/2022 03:02 PM       Imaging:  CXR: Cardiomegaly with patchy perihilar and bibasilar infiltrates and effusions likely CHF/edema and/or pneumonia. Soft tissue prominence of the right hilum. Consider surveillance preferably by CT scan.     EKG:  I've personally reviewed the patient's EKG:  Sinus tachycardia-possible left atrial enlargement    Telemetry:  I've personally reviewed the patient's telemetry:  NSR    ASSESSMENT/PLAN:  Principal Problem:    NSTEMI (non-ST elevated myocardial infarction) ([facility]  Active Problems:    Acute heart failure (HCC)    Systolic murmur    EKG, abnormal    Abnormal nuclear stress test    Primary hypertension    Hyperlipidemia    Carotid artery disease ([facility]  Resolved Problems:    * No resolved hospital problems. *    28-year-old female with a history of ARJUN and hyperlipidemia presents to the ED with complaints of chest pain and is admitted to telemetry unit with    NSTEMI  Monitor labs - troponins - 506  IV heparin  ASA/beta-blocker/statin  Lasix 20 mg IV once  [id number]  Cardiology consulted  Patient will have cardiac catheterization this week    Medication for other comorbidities continue as appropriate dose adjustment as necessary. DVT prophylaxis  PT OT  Discharge planning  Case discussed with attending and agreed upon plan of care.        Code status: Full  Requires Inpatient level of care  LUCILLE Costa CNP    6:43 AM  12/25/2022

## 2022-12-25 NOTE — PROGRESS NOTES
Patient is seen in follow-up for non-ST elevation MI    Subjective:     Ms. Caryn Rodriguez feels okay today, denies any chest pain or shortness of breath  Laying in bed no apparent distress    ROS:  CONSTITUTIONAL:  negative for  fevers, chills  HEENT:  negative for earaches, nasal congestion and epistaxis  RESPIRATORY:  negative for  dry cough, cough with sputum,wheezing and hemoptysis  GASTROINTESTINAL:  negative for nausea, vomiting  MUSCULOSKELETAL:  negative for  myalgias, arthralgias  NEUROLOGICAL:  negative for visual disturbance, dysphagia    Medication side effects: none    Scheduled Meds:   amLODIPine  10 mg Oral Daily    zolpidem  10 mg Oral Nightly    sodium chloride flush  5-40 mL IntraVENous 2 times per day    aspirin  81 mg Oral Daily    simvastatin  40 mg Oral Nightly    metoprolol tartrate  25 mg Oral BID     Continuous Infusions:   nitroGLYCERIN 10 mcg/min (12/25/22 1342)    heparin (PORCINE) Infusion 12 Units/kg/hr (12/25/22 0001)    sodium chloride       PRN Meds:nitroGLYCERIN, heparin (porcine), heparin (porcine), sodium chloride flush, sodium chloride, ondansetron **OR** ondansetron, acetaminophen **OR** acetaminophen, polyethylene glycol, nitroGLYCERIN, magnesium sulfate, potassium chloride **OR** potassium alternative oral replacement **OR** potassium chloride, ALPRAZolam, perflutren lipid microspheres    I/O last 3 completed shifts: In: 258.7 [P.O.:120; I.V.:138.7]  Out: 850 [Urine:850]  I/O this shift:  In: 336 [P.O.:240;  I.V.:96]  Out: 300 [Urine:300]      Objective:      Physical Exam:   /66   Pulse 79   Temp 98 °F (36.7 °C) (Temporal)   Resp 16   Ht 5' 4\" (1.626 m)   Wt 150 lb 12.8 oz (68.4 kg)   SpO2 98%   BMI 25.88 kg/m²   CONSTITUTIONAL:  awake, alert, cooperative, no apparent distress, and appears stated age  HEAD:  normocepalic, without obvious abnormality, atraumatic  NECK:  Supple, symmetrical, trachea midline, no adenopathy, thyroid symmetric, not enlarged and no tenderness, skin normal  LUNGS:  No increased work of breathing, No accessory muscle use or intercostal retractions, good air exchange, clear to auscultation bilaterally, no crackles or wheezing  CARDIOVASCULAR:  Normal apical impulse, regular rate and rhythm, normal S1 and S2, no S3 or S4, 3/6 systolic murmur at the apex, 2 out of systolic ejection murmur at the right upper sternal border, no edema, no JVD, no carotid bruit. ABDOMEN:  Soft, nontender, no masses, no hepatomegaly, no splenomegaly, BS+  MUSCULOSKELETAL:  No clubbing no cyanosis. there is no redness, warmth, or swelling of the joints  full range of motion noted  NEUROLOGIC:  Alert, awake,oriented x3  SKIN:  no bruising or bleeding, normal skin color, texture, turgor and no redness, warmth, or swelling      Cardiographics  I personally reviewed the telemetry monitor strips with the following interpretation: Sinus rhythm    Echocardiogram: not done    Imaging  XR CHEST PORTABLE   Final Result   Cardiomegaly with  patchy perihilar and bibasilar infiltrates and effusions   likely CHF/edema and or pneumonia. Soft tissue prominence in the right hilum. Consider surveillance preferably   by CT scan. Lab Review   Lab Results   Component Value Date/Time     12/25/2022 05:13 AM    K 4.2 12/25/2022 05:13 AM     12/25/2022 05:13 AM    CO2 21 12/25/2022 05:13 AM    BUN 21 12/25/2022 05:13 AM    CREATININE 1.0 12/25/2022 05:13 AM    GLUCOSE 106 12/25/2022 05:13 AM    CALCIUM 10.0 12/25/2022 05:13 AM     Lab Results   Component Value Date/Time    WBC 7.4 12/25/2022 05:13 AM    HGB 10.3 12/25/2022 05:13 AM    HCT 32.0 12/25/2022 05:13 AM    MCV 92.0 12/25/2022 05:13 AM     12/25/2022 05:13 AM     I have personally reviewed the laboratory, cardiac diagnostic and radiographic testing as outlined above:    Assessment:     1.   Non-ST elevation MI: Now chest pain-free, will continue current treatment, serial cardiac enzymes, echocardiogram, was scheduled for outpatient cardiac catheterization on January 3, given her presentation of non-ST elevation MI, will perform cardiac catheterization during this admission   2. Acute heart failure: Improved, systolic versus diastolic?, ischemic versus nonischemic?,  Will christiane, will check echocardiogram.    3.  Systolic murmur: Consistent with mitral valve regurgitation: We will check echocardiogram   4. Abnormal EKG: As above   5. History of abnormal stress test   6. Hypertension: Controlled   7. Hyperlipidemia: Simvastatin   8. Carotid artery disease   Recommendations:     1. Continue current treatment  2. Cardiac catheterization: Indications, procedures, risks and benefits of cardiac catheterization were discussed with the patient with risks including, but not limited to, infection, vessel damage, bleeding, dye allergy, nephrotoxicity, dysrhythmia, MI, CVA and death as well as the potential need for emergent cardiac surgery. Patient verbalized understanding and is agreeable to proceed. (Tentatively on December 27, AUC 3/8)  3. Basic metabolic panel and CBC in  4. We will review echocardiogram once done  5. Further cardiac recommendations will be forthcoming pending her clinical course and diagnostic test findings    Discussed with patient  Discussed with nursing staff  Electronically signed by Rhiannon Gutierrez MD on 12/25/2022 at 4:39 PM  NOTE: This report was transcribed using voice recognition software.  Every effort was made to ensure accuracy; however, inadvertent computerized transcription errors may be present

## 2022-12-25 NOTE — PLAN OF CARE
Problem: Discharge Planning  Goal: Discharge to home or other facility with appropriate resources  12/25/2022 1044 by Carmen Lackey RN  Outcome: Progressing  Flowsheets (Taken 12/25/2022 0745)  Discharge to home or other facility with appropriate resources: Identify barriers to discharge with patient and caregiver  12/25/2022 0004 by Mayuri Graham RN  Outcome: Progressing     Problem: Pain  Goal: Verbalizes/displays adequate comfort level or baseline comfort level  12/25/2022 1044 by Carmen Lackey RN  Outcome: Progressing  [address] (Taken 12/25/2022 0756)  Verbalizes/displays adequate comfort level or baseline comfort level: Assess pain using appropriate pain scale  12/25/2022 0004 by Mayuri Graham RN  Outcome: Progressing     Problem: ABCDS Injury Assessment  Goal: Absence of physical injury  12/25/2022 1044 by Carmen Lackey RN  Outcome: Progressing  Flowsheets (Taken 12/25/2022 1043)  Absence of Physical Injury: Implement safety measures based on patient assessment  12/25/2022 0004 by Mayuri Graham RN  Outcome: Progressing

## 2022-12-26 LAB
APTT: 50.3 SEC (ref 24.5–35.1)
EKG ATRIAL RATE: 102 BPM
EKG ATRIAL RATE: 111 BPM
EKG P AXIS: 72 DEGREES
EKG P AXIS: 84 DEGREES
EKG P-R INTERVAL: 146 MS
EKG P-R INTERVAL: 150 MS
EKG Q-T INTERVAL: 326 MS
EKG Q-T INTERVAL: 344 MS
EKG QRS DURATION: 100 MS
EKG QRS DURATION: 106 MS
EKG QTC CALCULATION (BAZETT): 443 MS
EKG QTC CALCULATION (BAZETT): 448 MS
EKG R AXIS: 85 DEGREES
EKG R AXIS: 95 DEGREES
EKG T AXIS: -123 DEGREES
EKG T AXIS: -166 DEGREES
EKG VENTRICULAR RATE: 102 BPM
EKG VENTRICULAR RATE: 111 BPM
FERRITIN: 74 NG/ML
HCT VFR BLD CALC: 28.7 % (ref 34–48)
HEMOGLOBIN: 9.4 G/DL (ref 11.5–15.5)
IRON SATURATION: 11 % (ref 15–50)
IRON: 24 MCG/DL (ref 37–145)
LV EF: 40 %
LVEF MODALITY: NORMAL
MCH RBC QN AUTO: 29.3 PG (ref 26–35)
MCHC RBC AUTO-ENTMCNC: 32.8 % (ref 32–34.5)
MCV RBC AUTO: 89.4 FL (ref 80–99.9)
PDW BLD-RTO: 12.7 FL (ref 11.5–15)
PLATELET # BLD: 234 E9/L (ref 130–450)
PMV BLD AUTO: 9.5 FL (ref 7–12)
RBC # BLD: 3.21 E12/L (ref 3.5–5.5)
TOTAL IRON BINDING CAPACITY: 216 MCG/DL (ref 250–450)
WBC # BLD: 7.5 E9/L (ref 4.5–11.5)

## 2022-12-26 PROCEDURE — 93010 ELECTROCARDIOGRAM REPORT: CPT | Performed by: INTERNAL MEDICINE

## 2022-12-26 PROCEDURE — 82728 ASSAY OF FERRITIN: CPT

## 2022-12-26 PROCEDURE — 6370000000 HC RX 637 (ALT 250 FOR IP): Performed by: PHYSICIAN ASSISTANT

## 2022-12-26 PROCEDURE — 2580000003 HC RX 258: Performed by: PHYSICIAN ASSISTANT

## 2022-12-26 PROCEDURE — 6370000000 HC RX 637 (ALT 250 FOR IP): Performed by: INTERNAL MEDICINE

## 2022-12-26 PROCEDURE — 85027 COMPLETE CBC AUTOMATED: CPT

## 2022-12-26 PROCEDURE — 2140000000 HC CCU INTERMEDIATE R&B

## 2022-12-26 PROCEDURE — 85730 THROMBOPLASTIN TIME PARTIAL: CPT

## 2022-12-26 PROCEDURE — 83540 ASSAY OF IRON: CPT

## 2022-12-26 PROCEDURE — 99233 SBSQ HOSP IP/OBS HIGH 50: CPT | Performed by: INTERNAL MEDICINE

## 2022-12-26 PROCEDURE — 83550 IRON BINDING TEST: CPT

## 2022-12-26 PROCEDURE — 36415 COLL VENOUS BLD VENIPUNCTURE: CPT

## 2022-12-26 PROCEDURE — 93306 TTE W/DOPPLER COMPLETE: CPT

## 2022-12-26 PROCEDURE — 6360000002 HC RX W HCPCS: Performed by: INTERNAL MEDICINE

## 2022-12-26 RX ORDER — FUROSEMIDE 10 MG/ML
20 INJECTION INTRAMUSCULAR; INTRAVENOUS ONCE
Status: COMPLETED | OUTPATIENT
Start: 2022-12-26 | End: 2022-12-26

## 2022-12-26 RX ORDER — METOPROLOL TARTRATE 50 MG/1
50 TABLET, FILM COATED ORAL 2 TIMES DAILY
Status: DISCONTINUED | OUTPATIENT
Start: 2022-12-26 | End: 2022-12-26

## 2022-12-26 RX ORDER — METOPROLOL SUCCINATE 50 MG/1
50 TABLET, EXTENDED RELEASE ORAL 2 TIMES DAILY
Status: DISCONTINUED | OUTPATIENT
Start: 2022-12-26 | End: 2023-01-02

## 2022-12-26 RX ORDER — PANTOPRAZOLE SODIUM 40 MG/1
40 TABLET, DELAYED RELEASE ORAL
Status: DISCONTINUED | OUTPATIENT
Start: 2022-12-27 | End: 2023-01-03

## 2022-12-26 RX ADMIN — ZOLPIDEM TARTRATE 10 MG: 5 TABLET ORAL at 21:01

## 2022-12-26 RX ADMIN — Medication 40 MG: at 21:01

## 2022-12-26 RX ADMIN — METOPROLOL SUCCINATE 50 MG: 50 TABLET, EXTENDED RELEASE ORAL at 21:00

## 2022-12-26 RX ADMIN — SODIUM CHLORIDE, PRESERVATIVE FREE 10 ML: 5 INJECTION INTRAVENOUS at 08:03

## 2022-12-26 RX ADMIN — FUROSEMIDE 20 MG: 10 INJECTION, SOLUTION INTRAMUSCULAR; INTRAVENOUS at 10:23

## 2022-12-26 RX ADMIN — ACETAMINOPHEN 650 MG: 325 TABLET ORAL at 08:01

## 2022-12-26 RX ADMIN — SODIUM CHLORIDE, PRESERVATIVE FREE 10 ML: 5 INJECTION INTRAVENOUS at 21:02

## 2022-12-26 RX ADMIN — METOPROLOL TARTRATE 25 MG: 25 TABLET, FILM COATED ORAL at 08:01

## 2022-12-26 RX ADMIN — AMLODIPINE BESYLATE 10 MG: 10 TABLET ORAL at 08:01

## 2022-12-26 RX ADMIN — ASPIRIN 81 MG CHEWABLE TABLET 81 MG: 81 TABLET CHEWABLE at 08:01

## 2022-12-26 ASSESSMENT — PAIN DESCRIPTION - FREQUENCY: FREQUENCY: CONTINUOUS

## 2022-12-26 ASSESSMENT — PAIN SCALES - GENERAL
PAINLEVEL_OUTOF10: 0
PAINLEVEL_OUTOF10: 7
PAINLEVEL_OUTOF10: 3
PAINLEVEL_OUTOF10: 0

## 2022-12-26 ASSESSMENT — PAIN DESCRIPTION - ONSET: ONSET: GRADUAL

## 2022-12-26 ASSESSMENT — PAIN DESCRIPTION - LOCATION: LOCATION: HEAD

## 2022-12-26 ASSESSMENT — PAIN DESCRIPTION - DESCRIPTORS: DESCRIPTORS: ACHING;DULL;DISCOMFORT

## 2022-12-26 ASSESSMENT — PAIN DESCRIPTION - ORIENTATION: ORIENTATION: ANTERIOR

## 2022-12-26 ASSESSMENT — PAIN DESCRIPTION - PAIN TYPE: TYPE: ACUTE PAIN

## 2022-12-26 NOTE — CONSULTS
GENERAL SURGERY  CONSULT NOTE  12/26/2022    Physician Consulted: Dr. Blas Walker  Reason for Consult: anemia/gib  Referring Physician: Dr. Candace Whaley  Rojelio Latham is a 78 y.o. female who presents for evaluation of shortness of breath and chest pain on 12/24. She has a past medical history of hypertension & hyperlipidemia. General surgery consulted for anemia in the setting of therapeutic anticoagulation. She presented to the hospital with chest pain and dyspnea. Patient had elevated troponin and was diagnosed with NSTEMI. Cardiology evaluated and she was started on a heparin drip. Of note, patient was scheduled for outpatient cardiac catheterization approximately 1 week from now. Shortly after heparin drip was started her hemoglobin was noted to trend down from 11.8 to 9.4. No physical evidence of bleeding. Primary team concerned for GI bleeding in the setting of possible placement of cardiac stents and dual antiplatelet therapy. Patient is currently denying any abdominal pain or signs of melena or hematochezia. States she has not had a bowel movement in 3 days. Has had a history of hemorrhoids where she intermittently would use topical creams in the past.  Patient has never had an EGD before. Last colonoscopy was in September 2021 with Dr. Tri Galan. We currently do not have records in our EMR system but will obtain them. Patient states she was told to come back in 10 years as it was unremarkable. Past Medical History:   Diagnosis Date    ARJUN (cerebral atherosclerosis)     Hyperlipidemia     Hypertension        Past Surgical History:   Procedure Laterality Date    COLONOSCOPY      TONSILLECTOMY         Medications Prior to Admission    Prior to Admission medications    Medication Sig Start Date End Date Taking?  Authorizing Provider   losartan (COZAAR) 50 MG tablet Take 50 mg by mouth at bedtime 5/2/22   Historical Provider, MD   amLODIPine (NORVASC) 10 MG tablet Take 1 tablet by mouth daily  Patient not taking: Reported on 12/24/2022 2/21/22   Aarti Carmona MD   amLODIPine (NORVASC) 10 MG tablet Take 1 tablet by mouth daily  Patient not taking: Reported on 12/24/2022 2/21/22   Aarti Carmona MD   Cholecalciferol (VITAMIN D) 125 MCG (5000 UT) CAPS Take 5,000 Units by mouth daily  Patient not taking: Reported on 12/24/2022    Historical Provider, MD   vitamin E 1000 units capsule Take 1,000 Units by mouth daily    Historical Provider, MD   Calcium Carb-Cholecalciferol (CALCIUM 1000 + D) 1000-800 MG-UNIT TABS Take by mouth daily  Patient not taking: Reported on 12/24/2022    Historical Provider, MD   MAGNESIUM CHLORIDE-CALCIUM PO Take by mouth daily  Patient not taking: Reported on 12/24/2022    Historical Provider, MD   PROMETHAZINE HCL PO Take by mouth as needed    Historical Provider, MD   DEXTROMETHORPHAN HBR PO Take by mouth as needed    Historical Provider, MD   simvastatin (ZOCOR) 40 MG tablet Take 40 mg by mouth nightly    Historical Provider, MD   isosorbide mononitrate (IMDUR) 30 MG extended release tablet TAKE 1 TABLET DAILY  Patient not taking: Reported on 12/24/2022 4/13/20   Aarti Carmona MD   ibuprofen (ADVIL;MOTRIN) 800 MG tablet Take 800 mg by mouth as needed  3/12/17   Historical Provider, MD   aspirin-acetaminophen-caffeine (Orient Maclachlan) [phone] MG per tablet Take 1 tablet by mouth every 6 hours as needed for Headaches    Historical Provider, MD   zolpidem (AMBIEN) 10 MG tablet Take 10 mg by mouth nightly. Historical Provider, MD   traZODone HCl 150 MG TB24 Take 75 mg by mouth nightly     Historical Provider, MD       Allergies   Allergen Reactions    Lipitor [Atorvastatin]      Caused my bones to ache        History reviewed. No pertinent family history.     Social History     Tobacco Use    Smoking status: Never    Smokeless tobacco: Never   Vaping Use    Vaping Use: Never used   Substance Use Topics    Alcohol use: No    Drug use: No         Review of Systems: Review of Systems - History obtained from chart review and the patient  General ROS: negative for - chills, fever, or malaise  Allergy and Immunology ROS: negative for - hives  Hematological and Lymphatic ROS: negative for - jaundice or pallor  Respiratory ROS: positive for - shortness of breath  Cardiovascular ROS: positive for - chest pain and shortness of breath  Gastrointestinal ROS: no abdominal pain, change in bowel habits, or black or bloody stools  Musculoskeletal ROS: negative for - muscle pain or muscular weakness  Dermatological ROS: negative for - pruritus or rash        PHYSICAL EXAM:    Vitals:    12/26/22 1429   BP: 127/74   Pulse: 84   Resp: 18   Temp: 99.5 °F (37.5 °C)   SpO2: 96%       GENERAL:  NAD. A&Ox3. Answers questions appropriately  HEAD:  Normocephalic. Atraumatic. EYES:   No scleral icterus. PERRL. LUNGS: Nonlabored breathing on room air  CARDIOVASCULAR: RR, normotensive  ABDOMEN:  Soft, non-distended, non-tender. No guarding, rigidity, rebound. EXTREMITIES:   MAEx4. Atraumatic. No LE edema. SKIN:  Warm and dry  NEUROLOGIC:  GCS 15  RECTAL: Small perirectal skin tags. Small hemorrhoids, no firm palpable masses within the rectal vault, pale brown stool FOBT negative      ASSESSMENT/PLAN:  78 y.o. female with new onset anemia in the setting of therapeutic anticoagulation concerning for GI bleed    Plan will be    - EGD 12/27  - NPO at midnight  - continue PPI daily  - monitor/trend cbc  - transfuse blood products as necessary per primary  - Obtain records of last colonoscopy  - Continue therapeutic heparin  - Appreciate cardiology input on cardiac cath timing      discussed with Dr. Ginny Zapata.     Juan J Myers DO  Surgery Resident PGY-1  12/26/2022  6:52 PM

## 2022-12-26 NOTE — PROGRESS NOTES
Inpatient Cardiology Progress note     PATIENT IS BEING FOLLOWED FOR: NSTEMI    Marva Hilario is a 78 y.o. female known to Dr. Di Guzman     Patient is 78years old female with history of hypertension and hyperlipidemia who presented to the hospital 12/24/22 with chest pain and shortness of breath, patient was found to have elevated troponin, cardiology was consulted. As per patient patient has been having dyspnea on exertion and exertional chest pain since June, better with rest, symptoms have gotten worse over the last few weeks. 12/23/22,  chest pain was significantly worse, that prompted her to seek medical attention. At the time of the consultation she was chest pain-free: did complain of short of breath described as having to take a deep breath to expand her lungs. Denied any pedal edema, + easy fatigability, no PND, no orthopnea, denied lightheadedness, palpitations, presyncope or syncope. N.B. Patient was scheduled for [facility] by her primary cardiologist Dr. Di Guzman 1/3/23    SUBJECTIVE: Denies CP. \"short of breath \" described as having to take a deep breath to expand her lungs. OBJECTIVE: No apparent distress     ROS:  Consist: Denies fevers, chills or night sweats  Heart: Denies chest pain, palpitations, lightheadedness, dizziness or syncope  Lungs: Denies SOB, cough, wheezing, orthopnea or PND  GI: Denies abdominal pain, vomiting or diarrhea    PHYSICAL EXAM:   /71   Pulse 89   Temp 99.1 °F (37.3 °C) (Temporal)   Resp 18   Ht 5' 4\" (1.626 m)   Wt 152 lb 6.4 oz (69.1 kg)   SpO2 96%   BMI 26.16 kg/m²    B/P Range last 24 hours: Systolic (04JTO), OUX:772 , Min:108 , ZLB:937    Diastolic (22UAJ), VOE:21, Min:57, Max:90    CONST: Well developed, well nourished female who appears of stated age. Awake, alert and cooperative.  No apparent distress  HEENT:   Head- Normocephalic, atraumatic   Eyes- Conjunctivae pink, anicteric  Throat- Oral mucosa pink and moist  Neck-  No stridor, trachea midline, no jugular venous distention. No carotid bruit  CHEST: Chest symmetrical and non-tender to palpation. No accessory muscle use or intercostal retractions  RESPIRATORY:  Lung sounds - clear throughout fields   CARDIOVASCULAR:     Heart Inspection- shows no noted pulsations  Heart Palpation- no heaves or thrills; PMI is non-displaced   Heart Ausculation- Regular rate and rhythm, no murmur. No s3, s4 or rub   PV: No lower extremity edema. No varicosities. Pedal pulses palpable, no clubbing or cyanosis   ABDOMEN: Soft, non-tender to light palpation. Bowel sounds present. No palpable masses no organomegaly; no abdominal bruit  MS: Good muscle strength and tone. No atrophy or abnormal movements. : Deferred  SKIN: Warm and dry no statis dermatitis or ulcers   NEURO / PSYCH: Oriented to person, place and time. Speech clear and appropriate. Follows all commands.  Pleasant affect       Intake/Output Summary (Last 24 hours) at 12/26/2022 0916  Last data filed at 12/26/2022 0631  Gross per 24 hour   Intake 545.56 ml   Output 500 ml   Net 45.56 ml       Weight:   Wt Readings from Last 3 Encounters:   12/26/22 152 lb 6.4 oz (69.1 kg)   12/14/22 155 lb (70.3 kg)   06/14/22 162 lb (73.5 kg)     Current Inpatient Medications:   amLODIPine  10 mg Oral Daily    zolpidem  10 mg Oral Nightly    sodium chloride flush  5-40 mL IntraVENous 2 times per day    aspirin  81 mg Oral Daily    simvastatin  40 mg Oral Nightly    metoprolol tartrate  25 mg Oral BID       IV Infusions (if any):   nitroGLYCERIN 10 mcg/min (12/25/22 2334)    heparin (PORCINE) Infusion 12 Units/kg/hr (12/25/22 2334)    sodium chloride         DIAGNOSTIC/ LABORATORY DATA:  Labs:   CBC:   Recent Labs     12/25/22  0513 12/26/22  0529   WBC 7.4 7.5   HGB 10.3* 9.4*   HCT 32.0* 28.7*    234     BMP:   Recent Labs     12/24/22  1502 12/25/22  0513    145   K 4.5 4.2   CO2 19* 21*   BUN 20 21   CREATININE 0.9 1.0   LABGLOM >60 59   CALCIUM 10.7* 10.0 PT/INR:   Recent Labs     12/24/22  1502   PROTIME 11.5   INR 1.1     APTT:  Recent Labs     12/25/22  0513 12/26/22  0529   APTT 52.4* 50.3*     CARDIAC ENZYMES:  Recent Labs     12/24/22  1502 12/24/22  1814 12/25/22  0513   TROPHS 186* 506* 979*     FASTING LIPID PANEL:  Lab Results   Component Value Date/Time    CHOL 147 12/25/2022 05:13 AM    HDL 63 12/25/2022 05:13 AM    LDLCALC 61 12/25/2022 05:13 AM    TRIG 113 12/25/2022 05:13 AM     LIVER PROFILE:  Recent Labs     12/24/22  1502   AST 19   ALT 23   LABALBU 4.5       CXR 1/24/22:   Cardiomegaly with  patchy perihilar and bibasilar infiltrates and effusions likely CHF/edema and or pneumonia. Soft tissue prominence in the right hilum. Consider surveillance preferably by CT scan. 12 lead EKG 12/24/22:  Sinus tachycardia  Possible Left atrial enlargement  Cannot rule out Inferior infarct , age undetermined  ST & T wave abnormality, consider anterolateral ischemia    Telemetry: SR in the 80's    Echo: pending    ASSESSMENT:   1.  Non-ST elevation MI, remains CP free  2. Acute heart failure: systolic versus diastolic?, ischemic versus nonischemic? 3. Systolic murmur ? mitral valve regurgitation  4. Abnormal EKG: As above   5. History of abnormal stress test   6. Hypertension: Controlled   7. Hyperlipidemia: Simvastatin    8. Anemia with significant drop in H&H since admission         PLAN:  Monitor H&H. Anemia w/u as per the primary service  Will give one dose IV lasix.  Monitor BMP  Increase BB lopressor  Rest of cardiac medications same  Tentative cath +/- PCI tomorrow  Will follow    Electronically signed by Lawyer Boeck, MD on 12/26/2022 at 9:16 AM

## 2022-12-26 NOTE — PROGRESS NOTES
Hospitalist Progress Note      PCP: Enma Garcia MD    Date of Admission: 12/24/2022        Hospital Course:  HAD BEEN HAVING CHEST PAIN, AND WAS FOR A HEART CATH AFTER THE 1ST OF THE YEAR.   IT BECAME WORSE AND SHE CAME TO THE ER, FOUND TO HAVE A NSTEMI **    FOR CATH IN AM        Subjective:  NO COMPLAINTS           Medications:  Reviewed    Infusion Medications    nitroGLYCERIN 10 mcg/min (12/25/22 2334)    heparin (PORCINE) Infusion 12 Units/kg/hr (12/25/22 2334)    sodium chloride       Scheduled Medications    metoprolol tartrate  50 mg Oral BID    [START ON 12/27/2022] pantoprazole  40 mg Oral QAM AC    amLODIPine  10 mg Oral Daily    zolpidem  10 mg Oral Nightly    sodium chloride flush  5-40 mL IntraVENous 2 times per day    aspirin  81 mg Oral Daily    simvastatin  40 mg Oral Nightly     PRN Meds: nitroGLYCERIN, heparin (porcine), heparin (porcine), sodium chloride flush, sodium chloride, ondansetron **OR** ondansetron, acetaminophen **OR** acetaminophen, polyethylene glycol, nitroGLYCERIN, magnesium sulfate, potassium chloride **OR** potassium alternative oral replacement **OR** potassium chloride, ALPRAZolam, perflutren lipid microspheres      Intake/Output Summary (Last 24 hours) at 12/26/2022 1401  Last data filed at 12/26/2022 1156  Gross per 24 hour   Intake 209.56 ml   Output 500 ml   Net -290.44 ml       Exam:    /60   Pulse 72   Temp 99.1 °F (37.3 °C) (Temporal)   Resp 18   Ht 5' 4\" (1.626 m)   Wt 152 lb 6.4 oz (69.1 kg)   SpO2 95%   BMI 26.16 kg/m²           Gen:  WELL DEVELOPED  HEENT: NC/AT, moist mucous membranes, no oropharyngeal erythema or exudate  Neck: supple, trachea midline, no anterior cervical or SC LAD  Heart:  Normal s1/s2, RRR,  Lungs:  CTA  bilaterally,  Abd: bowel sounds present, soft, nontender, nondistended, no masses  Extrem:  No clubbing, cyanosis,   NO  edema  Skin: no rashes or lesions  Psych: A & O x3  Neuro: grossly intact, moves all four extremities. Labs:   Recent Labs     12/24/22  1814 12/25/22  0513 12/26/22  0529   WBC 9.7 7.4 7.5   HGB 11.8 10.3* 9.4*   HCT 37.1 32.0* 28.7*    255 234     Recent Labs     12/24/22  1502 12/25/22  0513    145   K 4.5 4.2    108*   CO2 19* 21*   BUN 20 21   CREATININE 0.9 1.0   CALCIUM 10.7* 10.0     Recent Labs     12/24/22  1502   AST 19   ALT 23   BILITOT 1.0   ALKPHOS 88     Recent Labs     12/24/22  1502   INR 1.1     No results for input(s): Primitivo Polly in the last 72 hours. Recent Labs     12/24/22  1502   AST 19   ALT 23   BILITOT 1.0   ALKPHOS 88     No results for input(s): LACTA in the last 72 hours. No results found for: Tank Olivier  No results found for: AMMONIA    Assessment:    Active Hospital Problems    Diagnosis Date Noted    NSTEMI (non-ST elevated myocardial infarction) ([address] [I21.4] 12/24/2022     Priority: Medium    Acute heart failure ([facility] [address] [I50.9] 12/24/2022     Priority: Medium    Systolic murmur [N12.8] 44/98/7563     Priority: Medium    EKG, abnormal [R94.31] 12/24/2022     Priority: Medium    Abnormal nuclear stress test [R94.39] 12/24/2022     Priority: Medium    Primary hypertension [I10] 12/24/2022     Priority: Medium    Hyperlipidemia [E78.5] 12/24/2022     Priority: Medium    Carotid artery disease ([facility] [address] [I77.9] 12/24/2022     Priority: Medium       Plan:  CATH IN AM   HEPARIN   ZOCOR   NORVASC    DVT Prophylaxis:  HEPARIN  Diet: ADULT DIET; Regular;  No Caffeine  Diet NPO Exceptions are: Sips of Water with Meds  Code Status: Full Code    PT/OT Eval Status:  ORDERED    Dispo -  HOME      Electronically signed by Jordan Cota DO on 12/26/2022 at 2:01 PM [place]

## 2022-12-26 NOTE — PLAN OF CARE
Problem: Pain  Goal: Verbalizes/displays adequate comfort level or baseline comfort level  12/25/2022 2336 by Pooja Lucas RN  Outcome: Progressing  Flowsheets  Taken 12/25/2022 1547 by Cristina Frazier RN  Verbalizes/displays adequate comfort level or baseline comfort level: Assess pain using appropriate pain scale  Taken 12/25/2022 1158 by Cristina Frazier RN  Verbalizes/displays adequate comfort level or baseline comfort level: Assess pain using appropriate pain scale

## 2022-12-26 NOTE — PROGRESS NOTES
At 1100 dr wade notified of surgical consult. Dr Ezekiel Duckworth notified that dr Erica Ponce asked resident to see patient.

## 2022-12-27 ENCOUNTER — ANESTHESIA (OUTPATIENT)
Dept: ENDOSCOPY | Age: 77
End: 2022-12-27
Payer: MEDICARE

## 2022-12-27 ENCOUNTER — ANESTHESIA EVENT (OUTPATIENT)
Dept: ENDOSCOPY | Age: 77
End: 2022-12-27
Payer: MEDICARE

## 2022-12-27 LAB
ANION GAP SERPL CALCULATED.3IONS-SCNC: 11 MMOL/L (ref 7–16)
APTT: 52.9 SEC (ref 24.5–35.1)
BUN BLDV-MCNC: 12 MG/DL (ref 6–23)
CALCIUM SERPL-MCNC: 10 MG/DL (ref 8.6–10.2)
CHLORIDE BLD-SCNC: 106 MMOL/L (ref 98–107)
CO2: 26 MMOL/L (ref 22–29)
CREAT SERPL-MCNC: 0.7 MG/DL (ref 0.5–1)
GFR SERPL CREATININE-BSD FRML MDRD: >60 ML/MIN/1.73
GLUCOSE BLD-MCNC: 108 MG/DL (ref 74–99)
HCT VFR BLD CALC: 31.1 % (ref 34–48)
HEMOGLOBIN: 10.3 G/DL (ref 11.5–15.5)
MCH RBC QN AUTO: 29.3 PG (ref 26–35)
MCHC RBC AUTO-ENTMCNC: 33.1 % (ref 32–34.5)
MCV RBC AUTO: 88.4 FL (ref 80–99.9)
PDW BLD-RTO: 12.6 FL (ref 11.5–15)
PLATELET # BLD: 285 E9/L (ref 130–450)
PMV BLD AUTO: 9.5 FL (ref 7–12)
POTASSIUM REFLEX MAGNESIUM: 3.9 MMOL/L (ref 3.5–5)
RBC # BLD: 3.52 E12/L (ref 3.5–5.5)
SODIUM BLD-SCNC: 143 MMOL/L (ref 132–146)
WBC # BLD: 7.3 E9/L (ref 4.5–11.5)

## 2022-12-27 PROCEDURE — 6370000000 HC RX 637 (ALT 250 FOR IP): Performed by: PHYSICIAN ASSISTANT

## 2022-12-27 PROCEDURE — 7100000000 HC PACU RECOVERY - FIRST 15 MIN: Performed by: SURGERY

## 2022-12-27 PROCEDURE — 80048 BASIC METABOLIC PNL TOTAL CA: CPT

## 2022-12-27 PROCEDURE — 36415 COLL VENOUS BLD VENIPUNCTURE: CPT

## 2022-12-27 PROCEDURE — 2500000003 HC RX 250 WO HCPCS: Performed by: EMERGENCY MEDICINE

## 2022-12-27 PROCEDURE — 85730 THROMBOPLASTIN TIME PARTIAL: CPT

## 2022-12-27 PROCEDURE — 3609017100 HC EGD: Performed by: SURGERY

## 2022-12-27 PROCEDURE — 0DJ08ZZ INSPECTION OF UPPER INTESTINAL TRACT, VIA NATURAL OR ARTIFICIAL OPENING ENDOSCOPIC: ICD-10-PCS | Performed by: SURGERY

## 2022-12-27 PROCEDURE — 6370000000 HC RX 637 (ALT 250 FOR IP): Performed by: INTERNAL MEDICINE

## 2022-12-27 PROCEDURE — 7100000001 HC PACU RECOVERY - ADDTL 15 MIN: Performed by: SURGERY

## 2022-12-27 PROCEDURE — 6360000002 HC RX W HCPCS: Performed by: ANESTHESIOLOGIST ASSISTANT

## 2022-12-27 PROCEDURE — 2709999900 HC NON-CHARGEABLE SUPPLY: Performed by: SURGERY

## 2022-12-27 PROCEDURE — 2140000000 HC CCU INTERMEDIATE R&B

## 2022-12-27 PROCEDURE — 6370000000 HC RX 637 (ALT 250 FOR IP): Performed by: SURGERY

## 2022-12-27 PROCEDURE — 2580000003 HC RX 258: Performed by: PHYSICIAN ASSISTANT

## 2022-12-27 PROCEDURE — 6360000002 HC RX W HCPCS: Performed by: EMERGENCY MEDICINE

## 2022-12-27 PROCEDURE — 3700000001 HC ADD 15 MINUTES (ANESTHESIA): Performed by: SURGERY

## 2022-12-27 PROCEDURE — 99222 1ST HOSP IP/OBS MODERATE 55: CPT | Performed by: SURGERY

## 2022-12-27 PROCEDURE — 85027 COMPLETE CBC AUTOMATED: CPT

## 2022-12-27 PROCEDURE — 2580000003 HC RX 258: Performed by: ANESTHESIOLOGIST ASSISTANT

## 2022-12-27 PROCEDURE — 3700000000 HC ANESTHESIA ATTENDED CARE: Performed by: SURGERY

## 2022-12-27 RX ORDER — PROPOFOL 10 MG/ML
INJECTION, EMULSION INTRAVENOUS PRN
Status: DISCONTINUED | OUTPATIENT
Start: 2022-12-27 | End: 2022-12-27 | Stop reason: SDUPTHER

## 2022-12-27 RX ORDER — SODIUM CHLORIDE 9 MG/ML
INJECTION, SOLUTION INTRAVENOUS CONTINUOUS PRN
Status: DISCONTINUED | OUTPATIENT
Start: 2022-12-27 | End: 2022-12-27 | Stop reason: SDUPTHER

## 2022-12-27 RX ORDER — LACTULOSE 10 G/15ML
20 SOLUTION ORAL 2 TIMES DAILY
Status: DISCONTINUED | OUTPATIENT
Start: 2022-12-27 | End: 2023-01-03

## 2022-12-27 RX ADMIN — ASPIRIN 81 MG CHEWABLE TABLET 81 MG: 81 TABLET CHEWABLE at 09:15

## 2022-12-27 RX ADMIN — SODIUM CHLORIDE, PRESERVATIVE FREE 10 ML: 5 INJECTION INTRAVENOUS at 09:14

## 2022-12-27 RX ADMIN — ZOLPIDEM TARTRATE 10 MG: 5 TABLET ORAL at 21:15

## 2022-12-27 RX ADMIN — HEPARIN SODIUM 12 UNITS/KG/HR: 10000 INJECTION, SOLUTION INTRAVENOUS at 06:47

## 2022-12-27 RX ADMIN — NITROGLYCERIN 10 MCG/MIN: 20 INJECTION INTRAVENOUS at 16:56

## 2022-12-27 RX ADMIN — PROPOFOL 80 MG: 10 INJECTION, EMULSION INTRAVENOUS at 15:04

## 2022-12-27 RX ADMIN — METOPROLOL SUCCINATE 50 MG: 50 TABLET, EXTENDED RELEASE ORAL at 21:15

## 2022-12-27 RX ADMIN — Medication 40 MG: at 21:47

## 2022-12-27 RX ADMIN — PANTOPRAZOLE SODIUM 40 MG: 40 TABLET, DELAYED RELEASE ORAL at 06:47

## 2022-12-27 RX ADMIN — AMLODIPINE BESYLATE 10 MG: 10 TABLET ORAL at 09:15

## 2022-12-27 RX ADMIN — METOPROLOL SUCCINATE 50 MG: 50 TABLET, EXTENDED RELEASE ORAL at 09:15

## 2022-12-27 RX ADMIN — SODIUM CHLORIDE: 9 INJECTION, SOLUTION INTRAVENOUS at 15:00

## 2022-12-27 ASSESSMENT — PAIN SCALES - GENERAL: PAINLEVEL_OUTOF10: 0

## 2022-12-27 NOTE — OP NOTE
EGD Op Note    DATE OF PROCEDURE: 12/27/2022     SURGEON: Nae Morrow MD    PREOPERATIVE DIAGNOSIS: anemia    POSTOPERATIVE DIAGNOSIS: Same, mild gastritis     OPERATION: Procedure(s):  EGD ESOPHAGOGASTRODUODENOSCOPY    ANESTHESIA: Local monitored anesthesia. ESTIMATED BLOOD LOSS: minimal    COMPLICATIONS: None. SPECIMENS:  * No specimens in log *    HISTORY: The patient is a 78y.o. year old female with history of above preop diagnosis. I recommended esophagogastroduodenoscopy with possible biopsy and I explained the risk, benefits, expected outcome, and alternatives to the procedure. Risks included but are not limited to bleeding, infection, respiratory distress, hypotension, and perforation of the esophagus, stomach, or duodenum. Patient understands and is in agreement. PROCEDURE: The patient was given IV conscious sedation per anesthesia. The patient was given supplemental oxygen by nasal cannula. The gastroscope was inserted orally and advanced under direct vision through the esophagus, through the stomach, through the pylorus, and into the duodenum. Findings:  Duodenum:     Descending: wnl     Bulb: wnl     Stomach:    Antrum: mild gastritis     Body: normal     Fundus: normal     Esophagus: small hiatal hernia diaphragmatic impression to GE junction 4 cm. GE junction at 36 cm. Larynx: not examined    The scope was removed and the patient tolerated the procedure well.      IMPRESSION/PLAN:   PPI or for heart catheterization and DAPT if indicated,         Nae Morrow MD  12/27/22  3:12 PM

## 2022-12-27 NOTE — PROGRESS NOTES
Received colonoscopy results via fax from [facility]. Placed in paper chart.

## 2022-12-27 NOTE — ANESTHESIA PRE PROCEDURE
Department of Anesthesiology  Preprocedure Note       Name:  Tomasa León   Age:  78 y.o.  :  1945                                          MRN:  [id number]         Date:  2022      Surgeon: Rita Rivero):  Jewel eWaver MD    Procedure: Procedure(s):  EGD ESOPHAGOGASTRODUODENOSCOPY    Medications prior to admission:   Prior to Admission medications    Medication Sig Start Date End Date Taking?  Authorizing Provider   losartan (COZAAR) 50 MG tablet Take 50 mg by mouth at bedtime 22   Historical Provider, MD   amLODIPine (NORVASC) 10 MG tablet Take 1 tablet by mouth daily  Patient not taking: Reported on 2022   Larry Nathan MD   amLODIPine (NORVASC) 10 MG tablet Take 1 tablet by mouth daily  Patient not taking: Reported on 2022   Larry Nathan MD   Cholecalciferol (VITAMIN D) 125 MCG (5000 UT) CAPS Take 5,000 Units by mouth daily  Patient not taking: Reported on 2022    Historical Provider, MD   vitamin E 1000 units capsule Take 1,000 Units by mouth daily    Historical Provider, MD   Calcium Carb-Cholecalciferol (CALCIUM 1000 + D) 1000-800 MG-UNIT TABS Take by mouth daily  Patient not taking: Reported on 2022    Historical Provider, MD   MAGNESIUM CHLORIDE-CALCIUM PO Take by mouth daily  Patient not taking: Reported on 2022    Historical Provider, MD   PROMETHAZINE HCL PO Take by mouth as needed    Historical Provider, MD   DEXTROMETHORPHAN HBR PO Take by mouth as needed    Historical Provider, MD   simvastatin (ZOCOR) 40 MG tablet Take 40 mg by mouth nightly    Historical Provider, MD   isosorbide mononitrate (IMDUR) 30 MG extended release tablet TAKE 1 TABLET DAILY  Patient not taking: Reported on 2022   Larry Nathan MD   ibuprofen (ADVIL;MOTRIN) 800 MG tablet Take 800 mg by mouth as needed  3/12/17   Historical Provider, MD   aspirin-acetaminophen-caffeine (Agapito Fuentes) 300-696-16 MG per tablet Take 1 tablet by mouth every 6 hours as needed for Headaches    Historical Provider, MD   zolpidem (AMBIEN) 10 MG tablet Take 10 mg by mouth nightly.      Historical Provider, MD   traZODone HCl 150 MG TB24 Take 75 mg by mouth nightly     Historical Provider, MD       Current medications:    Current Facility-Administered Medications   Medication Dose Route Frequency Provider Last Rate Last Admin    pantoprazole (PROTONIX) tablet 40 mg  40 mg Oral QAM  Wilfrid Mallissa Half, DO   40 mg at 12/27/22 8386    metoprolol succinate (TOPROL XL) extended release tablet 50 mg  50 mg Oral BID Shital Quevedo MD   50 mg at 12/27/22 0915    nitroGLYCERIN 50 mg in dextrose 5% 250 mL infusion  5-200 mcg/min IntraVENous Continuous Nayeli Matt MD 3 mL/hr at 12/27/22 0647 10 mcg/min at 12/27/22 0647    nitroGLYCERIN (NITROSTAT) SL tablet 0.4 mg  0.4 mg SubLINGual Q5 Min PRN Nayeli Matt MD   0.4 mg at 12/24/22 1505    heparin (porcine) injection 4,000 Units  4,000 Units IntraVENous PRN Nayeli Matt MD        heparin (porcine) injection 2,000 Units  2,000 Units IntraVENous PRN Nayeli Matt MD        heparin 25,000 units in dextrose 5% 250 mL (premix) infusion  5-30 Units/kg/hr IntraVENous Continuous Nayeli Matt MD 8.4 mL/hr at 12/27/22 0647 12 Units/kg/hr at 12/27/22 0647    amLODIPine (NORVASC) tablet 10 mg  10 mg Oral Daily TITUS [place]   10 mg at 12/27/22 0915    zolpidem (AMBIEN) tablet 10 mg  10 mg Oral Nightly TITUS [place]   10 mg at 12/26/22 2101    sodium chloride flush 0.9 % injection 5-40 mL  5-40 mL IntraVENous 2 times per day TITUS [place]   10 mL at 12/27/22 0914    sodium chloride flush 0.9 % injection 5-40 mL  5-40 mL IntraVENous PRN TITUS [place]   10 mL at 12/24/22 1812    0.9 % sodium chloride infusion   IntraVENous PRN TITUS [place]        ondansetron (ZOFRAN-ODT) disintegrating tablet 4 mg  4 mg Oral Q8H PRN TITUS [place]        Or    ondansetron (ZOFRAN) injection 4 mg  4 mg IntraVENous Q6H PRN Srinath Neth, PA        acetaminophen (TYLENOL) tablet 650 mg  650 mg Oral Q6H PRN Srinath Neth, PA   650 mg at 12/26/22 0801    Or    acetaminophen (TYLENOL) suppository 650 mg  650 mg Rectal Q6H PRN Srinath Hunterh, PA        polyethylene glycol (GLYCOLAX) packet 17 g  17 g Oral Daily PRN Srinath Hunterh, PA        aspirin chewable tablet 81 mg  81 mg Oral Daily Srinath Neth, PA   81 mg at 12/27/22 0915    nitroGLYCERIN (NITROSTAT) SL tablet 0.4 mg  0.4 mg SubLINGual Q5 Min PRN Srinath Neth, [address]        magnesium sulfate 2000 mg in 50 mL IVPB premix  2,000 mg IntraVENous PRN Srinath Hunterh, PA        potassium chloride (KLOR-CON M) extended release tablet 40 mEq  40 mEq Oral PRN Srinath Neth, PA        Or    potassium bicarb-citric acid (EFFER-K) effervescent tablet 40 mEq  40 mEq Oral PRN Srinath Neth, PA        Or    potassium chloride 10 mEq/100 mL IVPB (Peripheral Line)  10 mEq IntraVENous PRN Srinath Hunterh, PA        simvastatin (ZOCOR) tablet 40 mg  (Patient Supplied)  40 mg Oral Nightly Srinath Neth, PA   40 mg at 12/26/22 2101    ALPRAZolam (XANAX) tablet 0.25 mg  0.25 mg Oral TID PRN Emilia Coreas MD   0.25 mg at 12/25/22 0935    perflutren lipid microspheres (DEFINITY) injection 1.5 mL  1.5 mL IntraVENous ONCE PRN Emilia Coreas MD           Allergies:     Allergies   Allergen Reactions    Lipitor [Atorvastatin]      Caused my bones to ache        Problem List:    Patient Active Problem List   Diagnosis Code    Bilateral carotid artery stenosis I65.23    NSTEMI (non-ST elevated myocardial infarction) (HCC) I21.4    Acute heart failure (HCC) R15.3    Systolic murmur X81.1    EKG, abnormal R94.31    Abnormal nuclear stress test R94.39    Primary hypertension I10    Hyperlipidemia E78.5    Carotid artery disease (HCC) I77.9       Past Medical History:        Diagnosis Date    ARJUN (cerebral atherosclerosis)     Hyperlipidemia     Hypertension Past Surgical History:        Procedure Laterality Date    COLONOSCOPY      TONSILLECTOMY         Social History:    Social History     Tobacco Use    Smoking status: Never    Smokeless tobacco: Never   Substance Use Topics    Alcohol use: No                                Counseling given: Not Answered      Vital Signs (Current):   Vitals:    12/26/22 2339 12/27/22 0414 12/27/22 0756 12/27/22 1105   BP: 139/75 (!) 144/75 139/68 119/67   Pulse: 82 85 72 75   Resp: 18 18 18 18   Temp: 98.6 °F (37 °C) 98.6 °F (37 °C) 98.9 °F (37.2 °C) 98.6 °F (37 °C)   TempSrc: Temporal Temporal Temporal Temporal   SpO2:  96% 96% 96%   Weight:  149 lb 2 oz (67.6 kg)     Height:                                                  BP Readings from Last 3 Encounters:   12/27/22 119/67   12/14/22 138/78   01/13/22 132/76       NPO Status:                                                                                 BMI:   Wt Readings from Last 3 Encounters:   12/27/22 149 lb 2 oz (67.6 kg)   12/14/22 155 lb (70.3 kg)   06/14/22 162 lb (73.5 kg)     Body mass index is 25.6 kg/m².     CBC:   Lab Results   Component Value Date/Time    WBC 7.3 12/27/2022 04:52 AM    RBC 3.52 12/27/2022 04:52 AM    HGB 10.3 12/27/2022 04:52 AM    HCT 31.1 12/27/2022 04:52 AM    MCV 88.4 12/27/2022 04:52 AM    RDW 12.6 12/27/2022 04:52 AM     12/27/2022 04:52 AM       CMP:   Lab Results   Component Value Date/Time     12/27/2022 04:52 AM    K 3.9 12/27/2022 04:52 AM     12/27/2022 04:52 AM    CO2 26 12/27/2022 04:52 AM    BUN 12 12/27/2022 04:52 AM    CREATININE 0.7 12/27/2022 04:52 AM    LABGLOM >60 12/27/2022 04:52 AM    GLUCOSE 108 12/27/2022 04:52 AM    PROT 7.4 12/24/2022 03:02 PM    CALCIUM 10.0 12/27/2022 04:52 AM    BILITOT 1.0 12/24/2022 03:02 PM    ALKPHOS 88 12/24/2022 03:02 PM    AST 19 12/24/2022 03:02 PM    ALT 23 12/24/2022 03:02 PM       POC Tests: No results for input(s): POCGLU, POCNA, POCK, POCCL, POCBUN, POCHEMO, POCHCT in the last 72 hours. Coags:   Lab Results   Component Value Date/Time    PROTIME 11.5 12/24/2022 03:02 PM    INR 1.1 12/24/2022 03:02 PM    APTT 52.9 12/27/2022 04:52 AM       HCG (If Applicable): No results found for: PREGTESTUR, PREGSERUM, HCG, HCGQUANT     ABGs: No results found for: PHART, PO2ART, OMG3SAX, MAJ6LGQ, BEART, H3RTWZLW     Type & Screen (If Applicable):  No results found for: LABABO, LABRH    Drug/Infectious Status (If Applicable):  No results found for: HIV, HEPCAB    COVID-19 Screening (If Applicable): No results found for: COVID19        Anesthesia Evaluation  Patient summary reviewed and Nursing notes reviewed no history of anesthetic complications:   Airway: Mallampati: III  TM distance: >3 FB   Neck ROM: full  Mouth opening: > = 3 FB   Dental:      Comment: None loose    Pulmonary: breath sounds clear to auscultation                            ROS comment: Cardiomegaly with  patchy perihilar and bibasilar infiltrates and effusions  likely CHF/edema and or pneumonia.     Soft tissue prominence in the right hilum.  Consider surveillance preferably  by CT scan.          Cardiovascular:    (+) hypertension:, past MI (Recent, this hospitalization): < 1 month,         Rhythm: regular  Rate: normal                    Neuro/Psych:   Negative Neuro/Psych ROS              GI/Hepatic/Renal:            ROS comment: Anemia R/O GI bleed. Endo/Other:    (+) blood dyscrasia: anemia:., .                 Abdominal:             Vascular: negative vascular ROS. Other Findings:           Anesthesia Plan      MAC     ASA 4             Anesthetic plan and risks discussed with patient. Plan discussed with CRNA. Marcio Camacho DO   12/27/2022      Patient seen and examined, chart reviewed, agree with above findings. Anesthetic plan, risks, benefits, alternatives, and personnel involved discussed with patient.   Patient verbalized an understanding and agreed to proceed. NPO status confirmed. Anesthetic plan discussed with care team members and agreed upon.     Tanner Chicas DO   12/27/2022  2:31 PM

## 2022-12-27 NOTE — PROGRESS NOTES
Hospitalist Progress Note      PCP: Yulissa Chandra MD    Date of Admission: 12/24/2022        Hospital Course:  HAD BEEN HAVING CHEST PAIN, AND WAS FOR A HEART CATH AFTER THE 1ST OF THE YEAR.   IT BECAME WORSE AND SHE CAME TO THE ER, FOUND TO HAVE A NSTEMI **    FOR EGD TODAY, DUE TO DECREASING HGB  FOR CATH IN AM        Subjective:  CONSTIPATION           Medications:  Reviewed    Infusion Medications    nitroGLYCERIN 10 mcg/min (12/27/22 1500)    heparin (PORCINE) Infusion 12 Units/kg/hr (12/27/22 1500)    sodium chloride       Scheduled Medications    lactulose  20 g Oral BID    pantoprazole  40 mg Oral QAM AC    metoprolol succinate  50 mg Oral BID    amLODIPine  10 mg Oral Daily    zolpidem  10 mg Oral Nightly    sodium chloride flush  5-40 mL IntraVENous 2 times per day    aspirin  81 mg Oral Daily    simvastatin  40 mg Oral Nightly     PRN Meds: nitroGLYCERIN, heparin (porcine), heparin (porcine), sodium chloride flush, sodium chloride, ondansetron **OR** ondansetron, acetaminophen **OR** acetaminophen, polyethylene glycol, nitroGLYCERIN, magnesium sulfate, potassium chloride **OR** potassium alternative oral replacement **OR** potassium chloride, ALPRAZolam, perflutren lipid microspheres      Intake/Output Summary (Last 24 hours) at 12/27/2022 1628  Last data filed at 12/27/2022 1512  Gross per 24 hour   Intake 455.96 ml   Output 400 ml   Net 55.96 ml       Exam:    /71   Pulse 66   Temp 98.1 °F (36.7 °C)   Resp 18   Ht 5' 4\" (1.626 m)   Wt 149 lb 2 oz (67.6 kg)   SpO2 94%   BMI 25.60 kg/m²       Gen:  WELL DEVELOPED  HEENT: NC/AT, moist mucous membranes, no oropharyngeal erythema or exudate  Neck: supple, trachea midline, no anterior cervical or SC LAD  Heart:  Normal s1/s2, RRR,  Lungs:  CTA  bilaterally,  Abd: bowel sounds present, soft, nontender, nondistended, no masses  Extrem:  No clubbing, cyanosis,   NO  edema  Skin: no rashes or lesions  Psych: A & O x3  Neuro: grossly intact, moves all four extremities. Labs:   Recent Labs     12/25/22  0513 12/26/22  0529 12/27/22  0452   WBC 7.4 7.5 7.3   HGB 10.3* 9.4* 10.3*   HCT 32.0* 28.7* 31.1*    234 285     Recent Labs     12/25/22  0513 12/27/22  0452    143   K 4.2 3.9   * 106   CO2 21* 26   BUN 21 12   CREATININE 1.0 0.7   CALCIUM 10.0 10.0     No results for input(s): AST, ALT, BILIDIR, BILITOT, ALKPHOS in the last 72 hours. No results for input(s): INR in the last 72 hours. No results for input(s): Chris Pander in the last 72 hours. No results for input(s): AST, ALT, ALB, BILIDIR, BILITOT, ALKPHOS in the last 72 hours. No results for input(s): LACTA in the last 72 hours. No results found for: Kennedi Weller  No results found for: AMMONIA    Assessment:    Active Hospital Problems    Diagnosis Date Noted    NSTEMI (non-ST elevated myocardial infarction) ([facility] [address] [I21.4] 12/24/2022     Priority: Medium    Acute heart failure ([facility] [address] [I50.9] 12/24/2022     Priority: Medium    Systolic murmur [M55.8] 90/36/3625     Priority: Medium    EKG, abnormal [R94.31] 12/24/2022     Priority: Medium    Abnormal nuclear stress test [R94.39] 12/24/2022     Priority: Medium    Primary hypertension [I10] 12/24/2022     Priority: Medium    Hyperlipidemia [E78.5] 12/24/2022     Priority: Medium    Carotid artery disease ([facility] [address] [I77.9] 12/24/2022     Priority: Medium   CONSTIPATION   ANEMIA( iron def)  Plan:  EGD TODAY  CATH IN AM   HEPARIN   ZOCOR   NORVASC   LACTULOSE  DVT Prophylaxis:  HEPARIN  Diet: ADULT DIET; Regular;  No Caffeine  Diet NPO Exceptions are: Sips of Water with Meds  Code Status: Full Code     PT/OT Eval Status:  ORDERED     Dispo -  HOME    Electronically signed by Lola Carballo DO on 12/27/2022 at 4:28 PM [facility]

## 2022-12-27 NOTE — ANESTHESIA POSTPROCEDURE EVALUATION
Department of Anesthesiology  Postprocedure Note    Patient: Prema Powers  MRN: [id number]  YOB: 1945  Date of evaluation: 12/27/2022      Procedure Summary     Date: 12/27/22 Room / Location: [facility]    Anesthesia Start: 1500 Anesthesia Stop: 1514    Procedure: EGD ESOPHAGOGASTRODUODENOSCOPY Diagnosis:       Upper GI bleed      (Upper GI bleed [K92.2])    Surgeons: Coby Griffith MD Responsible Provider: Neri Gan DO    Anesthesia Type: MAC ASA Status: 4          Anesthesia Type: No value filed.     Shadi Phase I:      Shadi Phase II:        Anesthesia Post Evaluation    Patient location during evaluation: PACU  Patient participation: complete - patient participated  Level of consciousness: awake  Pain score: 0  Airway patency: patent  Nausea & Vomiting: no nausea and no vomiting  Complications: no  Cardiovascular status: blood pressure returned to baseline  Respiratory status: acceptable  Hydration status: stable

## 2022-12-27 NOTE — PROGRESS NOTES
GENERAL SURGERY  DAILY PROGRESS NOTE  12/27/2022  Chief Complaint   Patient presents with    Chest Pain     Pt sent in from [facility]. Mid sternal chest pressure. 324mg ASA pta         Subjective:  Denies overnight complaints. Denies abdominal pain, nausea, vomiting. Denies melena, hematochezia. Denies chest pain, dyspnea    She had colonoscopy Oct 2021 with Dr. Sterling Amaya @ [facility] which she reports did not find polyps and had diverticulosis    Objective:  BP (!) 144/75   Pulse 85   Temp 98.6 °F (37 °C) (Temporal)   Resp 18   Ht 5' 4\" (1.626 m)   Wt 149 lb 2 oz (67.6 kg)   SpO2 96%   BMI 25.60 kg/m²     GENERAL:  Laying in bed, awake, alert, cooperative, no apparent distress  HEAD: Normocephalic, atraumatic  EYES: No sclera icterus, pupils equal  LUNGS:  No increased work of breathing  CARDIOVASCULAR:  regular rate  ABDOMEN:  Soft, non-tender, non-distended  EXTREMITIES: No edema or swelling  SKIN: Warm and dry, no rashes or lesions    Assessment/Plan:  78 y.o. female with acute anemia admitted to [facility]    Monitor H/H, 10.3 from 9.4 today. No transfusion requirements or clinical signs of bleeding  NPO  EGD today to evaluate for possible UGI source given plan for cardiac cath and potential for DAPT  Obtain colonoscopy records from Dr. Deana Mcghee office. Likely will not need colonoscopy as she has had one in past 18mos. Electronically signed by Tim Iverson DO on 12/27/2022 at 5:56 AM        Attending Attestation   Patient seen and examined, agree with resident note except for changes made by me, for remaining HP/Consult/progress note details please see resident HP/Consult/progress note. - EGD today rule out any upper GI bleeding source prior to heart catheterization, case discussed with Dr Pattie Burch.     Dorene Ding MD                                                                      GENERAL SURGERY  CONSULT NOTE  12/26/2022     Physician Consulted: Dr. Dulce Short  Reason for Consult: anemia/gib  Referring Physician: Dr. Tyron Floyd  Usha Deluca is a 78 y.o. female who presents for evaluation of shortness of breath and chest pain on 12/24. She has a past medical history of hypertension & hyperlipidemia. General surgery consulted for anemia in the setting of therapeutic anticoagulation. She presented to the hospital with chest pain and dyspnea. Patient had elevated troponin and was diagnosed with NSTEMI. Cardiology evaluated and she was started on a heparin drip. Of note, patient was scheduled for outpatient cardiac catheterization approximately 1 week from now. Shortly after heparin drip was started her hemoglobin was noted to trend down from 11.8 to 9.4. No physical evidence of bleeding. Primary team concerned for GI bleeding in the setting of possible placement of cardiac stents and dual antiplatelet therapy. Patient is currently denying any abdominal pain or signs of melena or hematochezia. States she has not had a bowel movement in 3 days. Has had a history of hemorrhoids where she intermittently would use topical creams in the past.  Patient has never had an EGD before. Last colonoscopy was in September 2021 with Dr. Tessy Andino. We currently do not have records in our EMR system but will obtain them. Patient states she was told to come back in 10 years as it was unremarkable. Past Medical History        Past Medical History:   Diagnosis Date    ARJUN (cerebral atherosclerosis)      Hyperlipidemia      Hypertension              Past Surgical History         Past Surgical History:   Procedure Laterality Date    COLONOSCOPY        TONSILLECTOMY                Medications Prior to Admission    Home Medications           Prior to Admission medications    Medication Sig Start Date End Date Taking?  Authorizing Provider   losartan (COZAAR) 50 MG tablet Take 50 mg by mouth at bedtime 5/2/22     Historical Provider, MD   amLODIPine (NORVASC) 10 MG tablet Take 1 tablet by mouth daily  Patient not taking: Reported on 12/24/2022 2/21/22     Lisa Alarcon MD   amLODIPine (NORVASC) 10 MG tablet Take 1 tablet by mouth daily  Patient not taking: Reported on 12/24/2022 2/21/22     Lisa Alarcon MD   Cholecalciferol (VITAMIN D) 125 MCG (5000 UT) CAPS Take 5,000 Units by mouth daily  Patient not taking: Reported on 12/24/2022       Historical Provider, MD   vitamin E 1000 units capsule Take 1,000 Units by mouth daily       Historical Provider, MD   Calcium Carb-Cholecalciferol (CALCIUM 1000 + D) 1000-800 MG-UNIT TABS Take by mouth daily  Patient not taking: Reported on 12/24/2022       Historical Provider, MD   MAGNESIUM CHLORIDE-CALCIUM PO Take by mouth daily  Patient not taking: Reported on 12/24/2022       Historical Provider, MD   PROMETHAZINE HCL PO Take by mouth as needed       Historical Provider, MD   DEXTROMETHORPHAN HBR PO Take by mouth as needed       Historical Provider, MD   simvastatin (ZOCOR) 40 MG tablet Take 40 mg by mouth nightly       Historical Provider, MD   isosorbide mononitrate (IMDUR) 30 MG extended release tablet TAKE 1 TABLET DAILY  Patient not taking: Reported on 12/24/2022 4/13/20     Lisa Alarcon MD   ibuprofen (ADVIL;MOTRIN) 800 MG tablet Take 800 mg by mouth as needed  3/12/17     Historical Provider, MD   aspirin-acetaminophen-caffeine (Douglas Flavio) 746-349-40 MG per tablet Take 1 tablet by mouth every 6 hours as needed for Headaches       Historical Provider, MD   zolpidem (AMBIEN) 10 MG tablet Take 10 mg by mouth nightly. Historical Provider, MD   traZODone HCl 150 MG TB24 Take 75 mg by mouth nightly        Historical Provider, MD                  Allergies   Allergen Reactions    Lipitor [Atorvastatin]         Caused my bones to ache          Family History   History reviewed. No pertinent family history.         Social History           Tobacco Use    Smoking status: Never    Smokeless tobacco: Never   Vaping Use    Vaping Use: Never used   Substance Use Topics    Alcohol use: No    Drug use: No            Review of Systems: Review of Systems - History obtained from chart review and the patient  General ROS: negative for - chills, fever, or malaise  Allergy and Immunology ROS: negative for - hives  Hematological and Lymphatic ROS: negative for - jaundice or pallor  Respiratory ROS: positive for - shortness of breath  Cardiovascular ROS: positive for - chest pain and shortness of breath  Gastrointestinal ROS: no abdominal pain, change in bowel habits, or black or bloody stools  Musculoskeletal ROS: negative for - muscle pain or muscular weakness  Dermatological ROS: negative for - pruritus or rash           PHYSICAL EXAM:         Vitals:     12/26/22 1429   BP: 127/74   Pulse: 84   Resp: 18   Temp: 99.5 °F (37.5 °C)   SpO2: 96%         GENERAL:  NAD. A&Ox3. Answers questions appropriately  HEAD:  Normocephalic. Atraumatic. EYES:   No scleral icterus. PERRL. LUNGS: Nonlabored breathing on room air  CARDIOVASCULAR: RR, normotensive  ABDOMEN:  Soft, non-distended, non-tender. No guarding, rigidity, rebound. EXTREMITIES:   MAEx4. Atraumatic. No LE edema. SKIN:  Warm and dry  NEUROLOGIC:  GCS 15  RECTAL: Small perirectal skin tags.   Small hemorrhoids, no firm palpable masses within the rectal vault, pale brown stool FOBT negative        ASSESSMENT/PLAN:  78 y.o. female with new onset anemia in the setting of therapeutic anticoagulation concerning for GI bleed     Plan will be     - EGD 12/27  - NPO at midnight  - continue PPI daily  - monitor/trend cbc  - transfuse blood products as necessary per primary  - Obtain records of last colonoscopy  - Continue therapeutic heparin  - Appreciate cardiology input on cardiac cath timing

## 2022-12-28 ENCOUNTER — APPOINTMENT (OUTPATIENT)
Dept: ULTRASOUND IMAGING | Age: 77
DRG: 233 | End: 2022-12-28
Payer: MEDICARE

## 2022-12-28 ENCOUNTER — APPOINTMENT (OUTPATIENT)
Dept: INTERVENTIONAL RADIOLOGY/VASCULAR | Age: 77
DRG: 233 | End: 2022-12-28
Payer: MEDICARE

## 2022-12-28 LAB
ABO/RH: NORMAL
ANION GAP SERPL CALCULATED.3IONS-SCNC: 10 MMOL/L (ref 7–16)
ANTIBODY SCREEN: NORMAL
APTT: 59.5 SEC (ref 24.5–35.1)
BUN BLDV-MCNC: 12 MG/DL (ref 6–23)
CALCIUM SERPL-MCNC: 10 MG/DL (ref 8.6–10.2)
CHLORIDE BLD-SCNC: 105 MMOL/L (ref 98–107)
CO2: 26 MMOL/L (ref 22–29)
CREAT SERPL-MCNC: 0.7 MG/DL (ref 0.5–1)
GFR SERPL CREATININE-BSD FRML MDRD: >60 ML/MIN/1.73
GLUCOSE BLD-MCNC: 106 MG/DL (ref 74–99)
HBA1C MFR BLD: 5.4 % (ref 4–5.6)
HCT VFR BLD CALC: 30.5 % (ref 34–48)
HEMOGLOBIN: 9.9 G/DL (ref 11.5–15.5)
MCH RBC QN AUTO: 29.6 PG (ref 26–35)
MCHC RBC AUTO-ENTMCNC: 32.5 % (ref 32–34.5)
MCV RBC AUTO: 91 FL (ref 80–99.9)
PDW BLD-RTO: 12.4 FL (ref 11.5–15)
PLATELET # BLD: 275 E9/L (ref 130–450)
PMV BLD AUTO: 9.2 FL (ref 7–12)
POTASSIUM REFLEX MAGNESIUM: 4 MMOL/L (ref 3.5–5)
RBC # BLD: 3.35 E12/L (ref 3.5–5.5)
SODIUM BLD-SCNC: 141 MMOL/L (ref 132–146)
WBC # BLD: 6 E9/L (ref 4.5–11.5)

## 2022-12-28 PROCEDURE — 6370000000 HC RX 637 (ALT 250 FOR IP): Performed by: SURGERY

## 2022-12-28 PROCEDURE — 2580000003 HC RX 258: Performed by: INTERNAL MEDICINE

## 2022-12-28 PROCEDURE — 80048 BASIC METABOLIC PNL TOTAL CA: CPT

## 2022-12-28 PROCEDURE — 93971 EXTREMITY STUDY: CPT | Performed by: RADIOLOGY

## 2022-12-28 PROCEDURE — 6370000000 HC RX 637 (ALT 250 FOR IP): Performed by: INTERNAL MEDICINE

## 2022-12-28 PROCEDURE — C1894 INTRO/SHEATH, NON-LASER: HCPCS

## 2022-12-28 PROCEDURE — 93458 L HRT ARTERY/VENTRICLE ANGIO: CPT

## 2022-12-28 PROCEDURE — 85027 COMPLETE CBC AUTOMATED: CPT

## 2022-12-28 PROCEDURE — B2111ZZ FLUOROSCOPY OF MULTIPLE CORONARY ARTERIES USING LOW OSMOLAR CONTRAST: ICD-10-PCS | Performed by: INTERNAL MEDICINE

## 2022-12-28 PROCEDURE — 93880 EXTRACRANIAL BILAT STUDY: CPT | Performed by: RADIOLOGY

## 2022-12-28 PROCEDURE — 86850 RBC ANTIBODY SCREEN: CPT

## 2022-12-28 PROCEDURE — B2131ZZ FLUOROSCOPY OF MULTIPLE CORONARY ARTERY BYPASS GRAFTS USING LOW OSMOLAR CONTRAST: ICD-10-PCS | Performed by: INTERNAL MEDICINE

## 2022-12-28 PROCEDURE — 93970 EXTREMITY STUDY: CPT

## 2022-12-28 PROCEDURE — 99024 POSTOP FOLLOW-UP VISIT: CPT | Performed by: INTERNAL MEDICINE

## 2022-12-28 PROCEDURE — 6360000002 HC RX W HCPCS

## 2022-12-28 PROCEDURE — 86900 BLOOD TYPING SEROLOGIC ABO: CPT

## 2022-12-28 PROCEDURE — 36415 COLL VENOUS BLD VENIPUNCTURE: CPT

## 2022-12-28 PROCEDURE — 6360000002 HC RX W HCPCS: Performed by: INTERNAL MEDICINE

## 2022-12-28 PROCEDURE — 93880 EXTRACRANIAL BILAT STUDY: CPT

## 2022-12-28 PROCEDURE — 2709999900 HC NON-CHARGEABLE SUPPLY

## 2022-12-28 PROCEDURE — 2140000000 HC CCU INTERMEDIATE R&B

## 2022-12-28 PROCEDURE — 85730 THROMBOPLASTIN TIME PARTIAL: CPT

## 2022-12-28 PROCEDURE — 93458 L HRT ARTERY/VENTRICLE ANGIO: CPT | Performed by: INTERNAL MEDICINE

## 2022-12-28 PROCEDURE — 86901 BLOOD TYPING SEROLOGIC RH(D): CPT

## 2022-12-28 PROCEDURE — 2500000003 HC RX 250 WO HCPCS

## 2022-12-28 PROCEDURE — 83036 HEMOGLOBIN GLYCOSYLATED A1C: CPT

## 2022-12-28 PROCEDURE — B2151ZZ FLUOROSCOPY OF LEFT HEART USING LOW OSMOLAR CONTRAST: ICD-10-PCS | Performed by: INTERNAL MEDICINE

## 2022-12-28 PROCEDURE — C1769 GUIDE WIRE: HCPCS

## 2022-12-28 PROCEDURE — 93922 UPR/L XTREMITY ART 2 LEVELS: CPT

## 2022-12-28 RX ORDER — ISOSORBIDE MONONITRATE 60 MG/1
60 TABLET, EXTENDED RELEASE ORAL DAILY
Status: DISCONTINUED | OUTPATIENT
Start: 2022-12-28 | End: 2023-01-03

## 2022-12-28 RX ORDER — SODIUM CHLORIDE 9 MG/ML
INJECTION, SOLUTION INTRAVENOUS CONTINUOUS
Status: DISCONTINUED | OUTPATIENT
Start: 2022-12-28 | End: 2023-01-03

## 2022-12-28 RX ADMIN — Medication 40 MG: at 22:00

## 2022-12-28 RX ADMIN — LACTULOSE 20 G: 20 SOLUTION ORAL at 13:18

## 2022-12-28 RX ADMIN — ASPIRIN 81 MG CHEWABLE TABLET 81 MG: 81 TABLET CHEWABLE at 08:45

## 2022-12-28 RX ADMIN — METOPROLOL SUCCINATE 50 MG: 50 TABLET, EXTENDED RELEASE ORAL at 22:00

## 2022-12-28 RX ADMIN — SODIUM CHLORIDE: 9 INJECTION, SOLUTION INTRAVENOUS at 18:28

## 2022-12-28 RX ADMIN — HEPARIN SODIUM 12 UNITS/KG/HR: 10000 INJECTION, SOLUTION INTRAVENOUS at 12:54

## 2022-12-28 RX ADMIN — METOPROLOL SUCCINATE 50 MG: 50 TABLET, EXTENDED RELEASE ORAL at 08:46

## 2022-12-28 RX ADMIN — AMLODIPINE BESYLATE 10 MG: 10 TABLET ORAL at 08:46

## 2022-12-28 RX ADMIN — SODIUM CHLORIDE: 9 INJECTION, SOLUTION INTRAVENOUS at 10:14

## 2022-12-28 RX ADMIN — ISOSORBIDE MONONITRATE 60 MG: 30 TABLET, EXTENDED RELEASE ORAL at 13:18

## 2022-12-28 RX ADMIN — ZOLPIDEM TARTRATE 10 MG: 5 TABLET ORAL at 22:00

## 2022-12-28 RX ADMIN — SODIUM CHLORIDE, PRESERVATIVE FREE 10 ML: 5 INJECTION INTRAVENOUS at 22:00

## 2022-12-28 ASSESSMENT — PAIN SCALES - GENERAL: PAINLEVEL_OUTOF10: 0

## 2022-12-28 NOTE — PROCEDURES
[address]                  [address] [place],  [facility]                            CARDIAC CATHETERIZATION    PATIENT NAME: Amy Gurrola                     :        1945  MED REC NO:   [id number]                            ROOM:       6302  ACCOUNT NO:   [de-identified]                           ADMIT DATE: 2022  PROVIDER:     Anastasia Portillo MD    DATE OF PROCEDURE:  2022    PROCEDURE:  Left heart catheterization, selective coronary angiography  and left ventriculography. The procedure was done through right radial approach using ultrasound  guidance. The patient received intravenous Versed and intravenous fentanyl for  sedation. INDICATION:  Non-ST-elevation myocardial infarction. AUC:  9-3. PRESSURES:  Aorta 114/55 with a mean of 80. Left ventricular systolic pressure 256, left ventricular end-diastolic  pressure 26. There was no significant gradient across the aortic valve. CORONARY ANGIOGRAPHY:  Left main:  The left main artery has an eccentric 90% distal vessel  stenosis just before the origins of the left anterior descending artery  and the left circumflex. LAD:  The left anterior descending artery is calcified in its proximal  segment. The proximal LAD is diffusely diseased with up to 95% luminal  narrowing. There is also 80 to 90% disease of the LAD after the  diagonal branch in the middle segment. The distal LAD after this lesion  over a long segment did not appear to have any significant angiographic  disease. The diagonal branch itself did not appear to have any  significant angiographic disease. LCX:  The left circumflex is a large but nondominant vessel. It gives  rise to two small first and second marginal branches. There is 50%  narrowing in the mid left circumflex before a large marginal branch and  the terminal lateral branch.     RCA:  The right coronary artery is a dominant vessel providing the  posterior descending artery branch and is occluded at the mid vessel  level with the distal vessel filling from right-to-right collaterals.    LEFT VENTRICULOGRAPHY:  The left ventricle is dilated.  There was  moderate generalized hypokinesis more so involving the left ventricular  apex with an estimated ejection fraction of 35 to 40%.    The right radial arterial sheath was removed at the end of the  procedure, and a TR band was applied with adequate hemostasis and with  preservation of pulse.    The patient tolerated the procedure well and left the cardiac  catheterization laboratory in stable condition.    ESTIMATED BLOOD LOSS:  10 mL.    CONTRAST USED:  50 mL.    CONCLUSIONS:  1.  Severe multivessel coronary artery disease as described above.  2.  Dilated left ventricle with an estimated ejection fraction of 35 to  40%.  3.  Elevated left ventricular end-diastolic pressure.        GREGORY FLORES MD    D: 12/28/2022 11:58:47       T: 12/28/2022 12:01:11     [email or web address]  Job#: [id number]     Doc#: [id number]    CC:

## 2022-12-28 NOTE — PROGRESS NOTES
Inpatient Cardiology Progress note     PATIENT IS BEING FOLLOWED FOR: NSTEMI    Aleksandr Cook is a 78 y.o. female known to Dr. Mary Bucio     Patient is 78years old female with history of hypertension and hyperlipidemia who presented to the hospital 22 with chest pain and shortness of breath, patient was found to have elevated troponin, cardiology was consulted. As per patient patient has been having dyspnea on exertion and exertional chest pain since , better with rest, symptoms have gotten worse over the last few weeks. 22,  chest pain was significantly worse, that prompted her to seek medical attention. At the time of the consultation she was chest pain-free: did complain of short of breath described as having to take a deep breath to expand her lungs. Denied any pedal edema, + easy fatigability, no PND, no orthopnea, denied lightheadedness, palpitations, presyncope or syncope. N.B. Patient was scheduled for C by her primary cardiologist Dr. Mary Bucio 1/3/23    EGD 22: no GI  bleeding. Mild gastritis    SUBJECTIVE: Denies CP. Denies SOB. No overnight issues  OBJECTIVE: No apparent distress     ROS:  Consist: Denies fevers, chills or night sweats  Heart: Denies chest pain, palpitations, lightheadedness, dizziness or syncope  Lungs: Denies SOB, cough, wheezing, orthopnea or PND  GI: Denies abdominal pain, vomiting or diarrhea    PHYSICAL EXAM:   /83   Pulse 71   Temp 98.9 °F (37.2 °C) (Tympanic)   Resp 18   Ht 5' 4\" (1.626 m)   Wt 145 lb 6 oz (65.9 kg)   SpO2 95%   BMI 24.95 kg/m²    B/P Range last 24 hours: Systolic (34XUU), TMA:120 , Min:103 , LL    Diastolic (12EDB), DWV:28, Min:66, Max:83    CONST: Well developed, well nourished female who appears of stated age. Awake, alert and cooperative.  No apparent distress  HEENT:   Head- Normocephalic, atraumatic   Eyes- Conjunctivae pink, anicteric  Throat- Oral mucosa pink and moist  Neck-  No stridor, trachea midline, no jugular venous distention. No carotid bruit  CHEST: Chest symmetrical and non-tender to palpation. No accessory muscle use or intercostal retractions  RESPIRATORY:  Lung sounds - clear throughout fields   CARDIOVASCULAR:     Heart Inspection- shows no noted pulsations  Heart Palpation- no heaves or thrills; PMI is non-displaced   Heart Ausculation- Regular rate and rhythm, no murmur. No s3, s4 or rub   PV: No lower extremity edema. No varicosities. Pedal pulses palpable, no clubbing or cyanosis   ABDOMEN: Soft, non-tender to light palpation. Bowel sounds present. No palpable masses no organomegaly; no abdominal bruit  MS: Good muscle strength and tone. No atrophy or abnormal movements. : Deferred  SKIN: Warm and dry no statis dermatitis or ulcers   NEURO / PSYCH: Oriented to person, place and time. Speech clear and appropriate. Follows all commands.  Pleasant affect       Intake/Output Summary (Last 24 hours) at 12/28/2022 1047  Last data filed at 12/28/2022 9252  Gross per 24 hour   Intake 365.69 ml   Output 300 ml   Net 65.69 ml       Weight:   Wt Readings from Last 3 Encounters:   12/28/22 145 lb 6 oz (65.9 kg)   12/14/22 155 lb (70.3 kg)   06/14/22 162 lb (73.5 kg)     Current Inpatient Medications:   lactulose  20 g Oral BID    pantoprazole  40 mg Oral QAM AC    metoprolol succinate  50 mg Oral BID    amLODIPine  10 mg Oral Daily    zolpidem  10 mg Oral Nightly    sodium chloride flush  5-40 mL IntraVENous 2 times per day    aspirin  81 mg Oral Daily    simvastatin  40 mg Oral Nightly       IV Infusions (if any):   sodium chloride 20 mL/hr at 12/28/22 1014    nitroGLYCERIN 10 mcg/min (12/28/22 0611)    heparin (PORCINE) Infusion 12 Units/kg/hr (12/28/22 3319)    sodium chloride         DIAGNOSTIC/ LABORATORY DATA:  Labs:   CBC:   Recent Labs     12/27/22 0452 12/28/22 0614   WBC 7.3 6.0   HGB 10.3* 9.9*   HCT 31.1* 30.5*    275     BMP:   Recent Labs     12/27/22 0452 12/28/22 0614    141   K 3.9 4.0   CO2 26 26   BUN 12 12   CREATININE 0.7 0.7   LABGLOM >60 >60   CALCIUM 10.0 10.0         APTT:  Recent Labs     12/27/22  0452 12/28/22  0614   APTT 52.9* 59.5*     CARDIAC ENZYMES:  No results for input(s): CKTOTAL, CKMB, CKMBINDEX, TROPHS in the last 72 hours.    FASTING LIPID PANEL:  Lab Results   Component Value Date/Time    CHOL 147 12/25/2022 05:13 AM    HDL 63 12/25/2022 05:13 AM    LDLCALC 61 12/25/2022 05:13 AM    TRIG 113 12/25/2022 05:13 AM           CXR 1/24/22:   Cardiomegaly with  patchy perihilar and bibasilar infiltrates and effusions likely CHF/edema and or pneumonia.   Soft tissue prominence in the right hilum.  Consider surveillance preferably by CT scan.      12 lead EKG 12/24/22:  Sinus tachycardia  Possible Left atrial enlargement  Cannot rule out Inferior infarct , age undetermined  ST & T wave abnormality, consider anterolateral ischemia    Telemetry: SR in the 80's    Echo 12/26/22 ( Dr. Milian ):   Summary   The left ventricle is mildly dilated.   There is apical wall akinesis, distal septal dyskinesis and distal   inferior wall hypokinesis with thinning corresponding thinning of the   myocardium.   Ejection fraction is visually estimated at 40%.   There is doppler evidence of stage I diastolic dysfunction.   Normal right ventricular size and function.   Normal size atria.   Mild mitral regurgitation.   Mild tricuspid regurgitation.   Moderate pulmonary hypertension.    ASSESSMENT:   1.  Non-ST elevation MI, remains CP free  2.  Ischemic cardiomyopathy with moderate LV systolic dysfunction ( EF ~ 40% on Echo 12/26/22 )  3.  Acute HFrEF  4.  Mild MR, Mild TR and moderate pulmonary HTN on Echo 12/26/22  5.  Hypertension: Controlled   6.  Hyperlipidemia: Simvastatin    7.  Anemia. H&H stable. EGD 12/27/22: small hiatal hernia with mild antral gastritis --> per general surgery: \" .EGD ok, no upper GIB, highly unlikely to be LGIB.  Ok for cardiac catheterization and DATP if indicated  \".          PLAN:  Appreciate General surgery input  For cath +/- PCI today  Will likely start PO lasix, spironolactone and ACE I therapy after cath  Rest of Cardiac medications same for now  Further recommendations to follow      Electronically signed by Sea Abdi MD on 12/28/2022 at 10:47 AM

## 2022-12-28 NOTE — PROGRESS NOTES
Hospitalist Progress Note      PCP: Jacob Ruiz MD    Date of Admission: 12/24/2022        Hospital Course:  :  HAD BEEN HAVING CHEST PAIN, AND WAS FOR A HEART CATH AFTER THE 1ST OF THE YEAR.   IT BECAME WORSE AND SHE CAME TO THE ER, FOUND TO HAVE A NSTEMI **    FOR EGD TODAY, DUE TO DECREASING HGB  **IT SHOWED GASTRITIS** DOFWN FOR CATH           Subjective:  IN CATH LAB           Medications:  Reviewed    Infusion Medications    sodium chloride 20 mL/hr at 12/28/22 1014    heparin (PORCINE) Infusion 12 Units/kg/hr (12/28/22 1254)    sodium chloride       Scheduled Medications    isosorbide mononitrate  60 mg Oral Daily    lactulose  20 g Oral BID    pantoprazole  40 mg Oral QAM AC    metoprolol succinate  50 mg Oral BID    amLODIPine  10 mg Oral Daily    zolpidem  10 mg Oral Nightly    sodium chloride flush  5-40 mL IntraVENous 2 times per day    aspirin  81 mg Oral Daily    simvastatin  40 mg Oral Nightly     PRN Meds: nitroGLYCERIN, heparin (porcine), heparin (porcine), sodium chloride flush, sodium chloride, ondansetron **OR** ondansetron, acetaminophen **OR** acetaminophen, polyethylene glycol, nitroGLYCERIN, magnesium sulfate, potassium chloride **OR** potassium alternative oral replacement **OR** potassium chloride, ALPRAZolam      Intake/Output Summary (Last 24 hours) at 12/28/2022 1433  Last data filed at 12/28/2022 5313  Gross per 24 hour   Intake 365.69 ml   Output 300 ml   Net 65.69 ml       Exam:    /72   Pulse 71   Temp 98.9 °F (37.2 °C) (Temporal)   Resp 15   Ht 5' 4\" (1.626 m)   Wt 145 lb 6 oz (65.9 kg)   SpO2 96%   BMI 24.95 kg/m²             Labs:   Recent Labs     12/26/22  0529 12/27/22  0452 12/28/22  0614   WBC 7.5 7.3 6.0   HGB 9.4* 10.3* 9.9*   HCT 28.7* 31.1* 30.5*    285 275     Recent Labs     12/27/22  0452 12/28/22  0614    141   K 3.9 4.0    105   CO2 26 26   BUN 12 12   CREATININE 0.7 0.7   CALCIUM 10.0 10.0     No results for input(s): AST, ALT, BILIDIR, BILITOT, ALKPHOS in the last 72 hours. No results for input(s): INR in the last 72 hours. No results for input(s): [place] in the last 72 hours. No results for input(s): AST, ALT, ALB, BILIDIR, BILITOT, ALKPHOS in the last 72 hours. No results for input(s): LACTA in the last 72 hours. No results found for: Emiliana Bernal  No results found for: AMMONIA    Assessment:    Active Hospital Problems    Diagnosis Date Noted    NSTEMI (non-ST elevated myocardial infarction) ([facility] [address] [I21.4] 12/24/2022     Priority: Medium    [facility] [I50.9] 12/24/2022     Priority: Medium    Systolic murmur [T69.1] 56/82/3764     Priority: Medium    EKG, abnormal [R94.31] 12/24/2022     Priority: Medium    Abnormal nuclear stress test [R94.39] 12/24/2022     Priority: Medium    Primary hypertension [I10] 12/24/2022     Priority: Medium    Hyperlipidemia [E78.5] 12/24/2022     Priority: Medium    Carotid artery disease ([facility] [I77.9] 12/24/2022     Priority: Medium   GASTRITIS  CONSTIPATION   ANEMIA( iron def)  Plan:   HEPARIN   ZOCOR   NORVASC   LACTULOSE  DVT Prophylaxis:  HEPARIN  Diet: ADULT DIET; Regular;  No Caffeine  Diet NPO Exceptions are: Sips of Water with Meds  Code Status: Full Code     PT/OT Eval Status:  ORDERED     Dispo -  HOME     Electronically signed by Mellisa Hilliard DO on 12/28/2022 at 2:33 PM [facility]

## 2022-12-28 NOTE — PROGRESS NOTES
Physical Therapy    Date: 2022       Patient Name: Tammie Pleitez  : [id number]      MRN: [id number]    PT hold this date due off floor for testing 22. Will continue to follow and complete evaluation at later time.      Karla Jeffery, PT

## 2022-12-28 NOTE — PROGRESS NOTES
OT consult received and appreciated. Chart reviewed. Will hold evaluation, pt for cardiac cath this date. Will evaluate at a later time. Thank you.  Vincenzo Serrato, OTR/L # [id number]

## 2022-12-29 ENCOUNTER — APPOINTMENT (OUTPATIENT)
Dept: CT IMAGING | Age: 77
DRG: 233 | End: 2022-12-29
Payer: MEDICARE

## 2022-12-29 LAB
ANION GAP SERPL CALCULATED.3IONS-SCNC: 13 MMOL/L (ref 7–16)
APTT: 54.3 SEC (ref 24.5–35.1)
BACTERIA: NORMAL /HPF
BILIRUBIN URINE: NEGATIVE
BLOOD, URINE: NEGATIVE
BUN BLDV-MCNC: 11 MG/DL (ref 6–23)
CALCIUM SERPL-MCNC: 10.6 MG/DL (ref 8.6–10.2)
CHLORIDE BLD-SCNC: 107 MMOL/L (ref 98–107)
CLARITY: CLEAR
CO2: 23 MMOL/L (ref 22–29)
COLOR: YELLOW
CREAT SERPL-MCNC: 0.7 MG/DL (ref 0.5–1)
GFR SERPL CREATININE-BSD FRML MDRD: >60 ML/MIN/1.73
GLUCOSE BLD-MCNC: 124 MG/DL (ref 74–99)
GLUCOSE URINE: NEGATIVE MG/DL
HCT VFR BLD CALC: 32.2 % (ref 34–48)
HEMOGLOBIN: 10.6 G/DL (ref 11.5–15.5)
KETONES, URINE: NEGATIVE MG/DL
LEUKOCYTE ESTERASE, URINE: ABNORMAL
MCH RBC QN AUTO: 29 PG (ref 26–35)
MCHC RBC AUTO-ENTMCNC: 32.9 % (ref 32–34.5)
MCV RBC AUTO: 88.2 FL (ref 80–99.9)
NITRITE, URINE: NEGATIVE
PDW BLD-RTO: 12.5 FL (ref 11.5–15)
PH UA: 7 (ref 5–9)
PLATELET # BLD: 301 E9/L (ref 130–450)
PMV BLD AUTO: 9.4 FL (ref 7–12)
POTASSIUM REFLEX MAGNESIUM: 4.1 MMOL/L (ref 3.5–5)
PROTEIN UA: NEGATIVE MG/DL
RBC # BLD: 3.65 E12/L (ref 3.5–5.5)
RBC UA: NORMAL /HPF (ref 0–2)
SODIUM BLD-SCNC: 143 MMOL/L (ref 132–146)
SPECIFIC GRAVITY UA: <=1.005 (ref 1–1.03)
UROBILINOGEN, URINE: 0.2 E.U./DL
WBC # BLD: 6.4 E9/L (ref 4.5–11.5)
WBC UA: NORMAL /HPF (ref 0–5)

## 2022-12-29 PROCEDURE — 81001 URINALYSIS AUTO W/SCOPE: CPT

## 2022-12-29 PROCEDURE — 94729 DIFFUSING CAPACITY: CPT

## 2022-12-29 PROCEDURE — 71250 CT THORAX DX C-: CPT

## 2022-12-29 PROCEDURE — 6370000000 HC RX 637 (ALT 250 FOR IP): Performed by: INTERNAL MEDICINE

## 2022-12-29 PROCEDURE — 85730 THROMBOPLASTIN TIME PARTIAL: CPT

## 2022-12-29 PROCEDURE — 6360000002 HC RX W HCPCS: Performed by: INTERNAL MEDICINE

## 2022-12-29 PROCEDURE — 87088 URINE BACTERIA CULTURE: CPT

## 2022-12-29 PROCEDURE — 87081 CULTURE SCREEN ONLY: CPT

## 2022-12-29 PROCEDURE — 2580000003 HC RX 258: Performed by: INTERNAL MEDICINE

## 2022-12-29 PROCEDURE — 36415 COLL VENOUS BLD VENIPUNCTURE: CPT

## 2022-12-29 PROCEDURE — 85027 COMPLETE CBC AUTOMATED: CPT

## 2022-12-29 PROCEDURE — 99232 SBSQ HOSP IP/OBS MODERATE 35: CPT | Performed by: INTERNAL MEDICINE

## 2022-12-29 PROCEDURE — 94375 RESPIRATORY FLOW VOLUME LOOP: CPT

## 2022-12-29 PROCEDURE — 99222 1ST HOSP IP/OBS MODERATE 55: CPT | Performed by: STUDENT IN AN ORGANIZED HEALTH CARE EDUCATION/TRAINING PROGRAM

## 2022-12-29 PROCEDURE — 80048 BASIC METABOLIC PNL TOTAL CA: CPT

## 2022-12-29 PROCEDURE — 2140000000 HC CCU INTERMEDIATE R&B

## 2022-12-29 RX ADMIN — ISOSORBIDE MONONITRATE 60 MG: 30 TABLET, EXTENDED RELEASE ORAL at 09:13

## 2022-12-29 RX ADMIN — HEPARIN SODIUM 12 UNITS/KG/HR: 10000 INJECTION, SOLUTION INTRAVENOUS at 19:21

## 2022-12-29 RX ADMIN — PANTOPRAZOLE SODIUM 40 MG: 40 TABLET, DELAYED RELEASE ORAL at 06:55

## 2022-12-29 RX ADMIN — METOPROLOL SUCCINATE 50 MG: 50 TABLET, EXTENDED RELEASE ORAL at 09:14

## 2022-12-29 RX ADMIN — Medication 40 MG: at 21:29

## 2022-12-29 RX ADMIN — AMLODIPINE BESYLATE 10 MG: 10 TABLET ORAL at 09:13

## 2022-12-29 RX ADMIN — METOPROLOL SUCCINATE 50 MG: 50 TABLET, EXTENDED RELEASE ORAL at 21:29

## 2022-12-29 RX ADMIN — SODIUM CHLORIDE, PRESERVATIVE FREE 10 ML: 5 INJECTION INTRAVENOUS at 09:13

## 2022-12-29 RX ADMIN — ZOLPIDEM TARTRATE 10 MG: 5 TABLET ORAL at 21:29

## 2022-12-29 RX ADMIN — ASPIRIN 81 MG CHEWABLE TABLET 81 MG: 81 TABLET CHEWABLE at 09:13

## 2022-12-29 ASSESSMENT — PAIN SCALES - GENERAL: PAINLEVEL_OUTOF10: 0

## 2022-12-29 NOTE — PROGRESS NOTES
Corrected age on pft printout, placed in chart. Unable to get predicted values for FVC and DLCO. Patients values/results are on the printout. Was unable under patient management with new software, to get the predicted values. Talked to several regarding issue,  was perfect served by cardiology.

## 2022-12-29 NOTE — CONSULTS
CTS Consult    Patient name: Iram Plummer    Reason for consult: MV CAD    Referring Physician: Pricila Benitez    Primary Care Physician: Yulissa Chandra MD    Date of service: 12/29/2022    Chief Complaint: chest pain    HPI: 77yo F with PMH HTN presented to the ED 12/24/2022 with chest pain. She was found to have an NSTEMI. LHC demonstrated severe MV CAD. CTS was consulted for additional recommendations. Currently, she denies chest pain or SOB. She is on heparin gtt and imdur. There are several family members present within the room. Allergies:    Allergies   Allergen Reactions    Lipitor [Atorvastatin]      Caused my bones to ache        Home medications:    Current Facility-Administered Medications   Medication Dose Route Frequency Provider Last Rate Last Admin    0.9 % sodium chloride infusion   IntraVENous Continuous Roma Vallejo MD 20 mL/hr at 12/28/22 2020 Rate Verify at 12/28/22 2020    isosorbide mononitrate (IMDUR) extended release tablet 60 mg  60 mg Oral Daily Roma Vallejo MD   60 mg at 12/29/22 0913    lactulose (CHRONULAC) 10 GM/15ML solution 20 g  20 g Oral BID Roma Vallejo MD   20 g at 12/28/22 1318    pantoprazole (PROTONIX) tablet 40 mg  40 mg Oral QAM AC Roma Vallejo MD   40 mg at 12/29/22 0655    metoprolol succinate (TOPROL XL) extended release tablet 50 mg  50 mg Oral BID Roma Vallejo MD   50 mg at 12/29/22 0914    nitroGLYCERIN (NITROSTAT) SL tablet 0.4 mg  0.4 mg SubLINGual Q5 Min PRN Roma Vallejo MD   0.4 mg at 12/24/22 1505    heparin (porcine) injection 4,000 Units  4,000 Units IntraVENous PRN Roma Vallejo MD        heparin (porcine) injection 2,000 Units  2,000 Units IntraVENous PRN Roma Vallejo MD        heparin 25,000 units in dextrose 5% 250 mL (premix) infusion  5-30 Units/kg/hr IntraVENous Continuous Roma Vallejo MD 8.4 mL/hr at 12/28/22 2020 12 Units/kg/hr at 12/28/22 2020    amLODIPine (NORVASC) tablet 10 mg  10 mg Oral Daily Roma Vallejo MD   10 mg at 12/29/22 0913    zolpidem (AMBIEN) tablet 10 mg  10 mg Oral Nightly Leslie Soto MD   10 mg at 12/28/22 2200    sodium chloride flush 0.9 % injection 5-40 mL  5-40 mL IntraVENous 2 times per day Leslie Soto MD   10 mL at 12/29/22 0913    sodium chloride flush 0.9 % injection 5-40 mL  5-40 mL IntraVENous PRN Leslie Soto MD   10 mL at 12/24/22 1812    0.9 % sodium chloride infusion   IntraVENous PRN Leslie Soto MD        ondansetron (ZOFRAN-ODT) disintegrating tablet 4 mg  4 mg Oral Q8H PRN Leslie Soto MD        Or    ondansetron [facility]) injection 4 mg  4 mg IntraVENous Q6H PRN Leslie Soto MD        acetaminophen (TYLENOL) tablet 650 mg  650 mg Oral Q6H PRN Leslie Soto MD   650 mg at 12/26/22 0801    Or    acetaminophen (TYLENOL) suppository 650 mg  650 mg Rectal Q6H PRN Leslie Soto MD        polyethylene glycol (GLYCOLAX) packet 17 g  17 g Oral Daily PRN Leslie Soto MD        aspirin chewable tablet 81 mg  81 mg Oral Daily Leslie Soto MD   81 mg at 12/29/22 0913    nitroGLYCERIN (NITROSTAT) SL tablet 0.4 mg  0.4 mg SubLINGual Q5 Min PRN Leslie Soto MD        magnesium sulfate 2000 mg in 50 mL IVPB premix  2,000 mg IntraVENous PRN Leslie Soto MD        potassium chloride (KLOR-CON M) extended release tablet 40 mEq  40 mEq Oral PRN Leslie Soto MD        Or    potassium bicarb-citric acid (EFFER-K) effervescent tablet 40 mEq  40 mEq Oral PRN Leslie Soto MD        Or    potassium chloride 10 mEq/100 mL IVPB (Peripheral Line)  10 mEq IntraVENous PRN Leslie Soto MD        simvastatin (ZOCOR) tablet 40 mg  (Patient Supplied)  40 mg Oral Nightly Leslie Soto MD   40 mg at 12/28/22 2200    ALPRAZolam Alisa Rocio) tablet 0.25 mg  0.25 mg Oral TID PRN Leslie Soto MD   0.25 mg at 12/25/22 0935       Past Medical History:  Past Medical History:   Diagnosis Date    ARJUN (cerebral atherosclerosis)     History of blood transfusion     Hyperlipidemia     Hypertension        Past Surgical History:  Past Surgical History:   Procedure Laterality Date    COLONOSCOPY      TONSILLECTOMY      UPPER GASTROINTESTINAL ENDOSCOPY N/A 12/27/2022    EGD ESOPHAGOGASTRODUODENOSCOPY performed by Yokasta Orozco MD at [facility] History:  Social History     Socioeconomic History    Marital status:      Spouse name: Not on file    Number of children: Not on file    Years of education: Not on file    Highest education level: Not on file   Occupational History    Not on file   Tobacco Use    Smoking status: Never    Smokeless tobacco: Never   Vaping Use    Vaping Use: Never used   Substance and Sexual Activity    Alcohol use: No    Drug use: No    Sexual activity: Not on file   Other Topics Concern    Not on file   Social History Narrative    Not on file     Social Determinants of Health     Financial Resource Strain: Not on file   Food Insecurity: Not on file   Transportation Needs: Not on file   Physical Activity: Not on file   Stress: Not on file   Social Connections: Not on file   Intimate Partner Violence: Not on file   Housing Stability: Not on file       Family History:  History reviewed. No pertinent family history. Review of Systems   All other systems reviewed and are negative. Labs:  Recent Labs     12/27/22 0452 12/28/22 0614 12/29/22  0515   WBC 7.3 6.0 6.4   HGB 10.3* 9.9* 10.6*   HCT 31.1* 30.5* 32.2*    275 301      Recent Labs     12/27/22 0452 12/28/22  0614 12/29/22  0515   BUN 12 12 11   CREATININE 0.7 0.7 0.7       Objective:  Vitals /69   Pulse 78   Temp 98 °F (36.7 °C) (Oral)   Resp 19   Ht 5' 4\" (1.626 m)   Wt 149 lb (67.6 kg)   SpO2 95%   BMI 25.58 kg/m²   Physical Exam  Vitals and nursing note reviewed. Constitutional:       General: She is not in acute distress. Appearance: Normal appearance. She is normal weight. She is not ill-appearing. HENT:      Head: Normocephalic and atraumatic. Nose: Nose normal. No congestion. Mouth/Throat:      Mouth: Mucous membranes are moist.      Pharynx: Oropharynx is clear. Eyes:      General: No scleral icterus. Extraocular Movements: Extraocular movements intact. Conjunctiva/sclera: Conjunctivae normal.   Cardiovascular:      Rate and Rhythm: Normal rate and regular rhythm. Pulses: Normal pulses. Pulmonary:      Effort: Pulmonary effort is normal. No respiratory distress. Abdominal:      General: Abdomen is flat. There is no distension. Palpations: Abdomen is soft. Musculoskeletal:         General: No swelling. Normal range of motion. Cervical back: Normal range of motion. Skin:     General: Skin is warm and dry. Capillary Refill: Capillary refill takes less than 2 seconds. Neurological:      General: No focal deficit present. Mental Status: She is alert and oriented to person, place, and time. Mental status is at baseline. Psychiatric:         Mood and Affect: Mood normal.         Behavior: Behavior normal.         Thought Content: Thought content normal.         Judgment: Judgment normal.          Transthoracic Echocardiogram  Transthoracic Echocardiography Report (TTE)      Demographics      Patient Name        Edinson Marc Gender               Female      Medical Record      [id number]       Room Number          6302   Number      Account #           [de-identified]      Procedure Date       12/26/2022      Corporate ID                       Ordering Physician   Emilia Coreas MD      Accession Number    [id number]     Referring Physician      Date of Birth       1945     Clemencia Becerril JOHNNIE Glenis      Age                 78 year(s)     Interpreting         Kate Sierra MD                                      Physician                                         Any Other     Procedure     Type of Study      TTE procedure:Echo Complete W/ Dop & Color Flow.      Procedure Date  Date: 12/26/2022 Start: 08:34 AM     Study Location: Portable  Technical Quality: Adequate visualization     Indications:NSTEMI. Patient Status: Routine     Height: 64 inches Weight: 154 pounds BSA: 1.75 m^2 BMI: 26.43 kg/m^2     BP: 119/71 mmHg      Findings      Left Ventricle   The left ventricle is mildly dilated. There is apical wall akinesis, distal septal dyskinesis and distal   inferior wall hypokinesis with thinning corresponding thinning of the   myocardium. Ejection fraction is visually estimated at 40%. There is doppler evidence of stage I diastolic dysfunction. Right Ventricle   Normal right ventricular size and function. Left Atrium   Normal sized left atrium ( by volume index ). Interatrial septum appears intact. Right Atrium   Normal right atrium size. Mitral Valve   Focal calcification mitral valve leaflets. Chordal calcification is present. Mild mitral annular calcification. Mild mitral regurgitation. Tricuspid Valve   The tricuspid valve appears structurally normal.   Mild tricuspid regurgitation. Moderate pulmonary hypertension. Estimated RVSP is 55 mmHg. Aortic Valve   Focal calcification aortic valve leaflets. The aortic valve is trileaflet. Aortic valve opens well. Pulmonic Valve   Normal pulmonic valve structure and function. Physiologic and/or trace pulmonic regurgitation present. Pericardial Effusion   No evidence of significant pericardial effusion. Fat pad present. Aorta   Aortic root dimension within normal limits. Conclusions      Summary   The left ventricle is mildly dilated. There is apical wall akinesis, distal septal dyskinesis and distal   inferior wall hypokinesis with thinning corresponding thinning of the   myocardium. Ejection fraction is visually estimated at 40%. There is doppler evidence of stage I diastolic dysfunction. Normal right ventricular size and function. Normal size atria. Mild mitral regurgitation.    Mild tricuspid regurgitation. Moderate pulmonary hypertension. Signature      ----------------------------------------------------------------   Electronically signed by Janet Lion MD(Interpreting   physician) on 2022 12:35 PM   ----------------------------------------------------------------    Left Heart Catheterization  [address] [facility]                  [address] [facility], [address] [facility]     PATIENT NAME: Bianca Bazan                     :        1945  MED REC NO:   [id number]                            ROOM:       [facility]  ACCOUNT NO:   [de-identified]                           ADMIT DATE: 2022  PROVIDER:     Janet Lion MD     DATE OF PROCEDURE:  2022     PROCEDURE:  Left heart catheterization, selective coronary angiography  and left ventriculography. The procedure was done through right radial approach using ultrasound  guidance. The patient received intravenous Versed and intravenous fentanyl for  sedation. INDICATION:  Non-ST-elevation myocardial infarction. AUC:  9-3. PRESSURES:  Aorta 114/55 with a mean of 80. Left ventricular systolic pressure 318, left ventricular end-diastolic  pressure 26. There was no significant gradient across the aortic valve. CORONARY ANGIOGRAPHY:  Left main:  The left main artery has an eccentric 90% distal vessel  stenosis just before the origins of the left anterior descending artery  and the left circumflex. LAD:  The left anterior descending artery is calcified in its proximal  segment. The proximal LAD is diffusely diseased with up to 95% luminal  narrowing. There is also 80 to 90% disease of the LAD after the  diagonal branch in the middle segment. The distal LAD after this lesion  over a long segment did not appear to have any significant angiographic  disease.   The diagonal branch itself did not appear to have any  significant angiographic disease. LCX:  The left circumflex is a large but nondominant vessel. It gives  rise to two small first and second marginal branches. There is 50%  narrowing in the mid left circumflex before a large marginal branch and  the terminal lateral branch. RCA:  The right coronary artery is a dominant vessel providing the  posterior descending artery branch and is occluded at the mid vessel  level with the distal vessel filling from right-to-right collaterals. LEFT VENTRICULOGRAPHY:  The left ventricle is dilated. There was  moderate generalized hypokinesis more so involving the left ventricular  apex with an estimated ejection fraction of 35 to 40%. The right radial arterial sheath was removed at the end of the  procedure, and a TR band was applied with adequate hemostasis and with  preservation of pulse. The patient tolerated the procedure well and left the cardiac  catheterization laboratory in stable condition. ESTIMATED BLOOD LOSS:  10 mL. CONTRAST USED:  50 mL. CONCLUSIONS:  1. Severe multivessel coronary artery disease as described above. 2.  Dilated left ventricle with an estimated ejection fraction of 35 to  40%. 3.  Elevated left ventricular end-diastolic pressure. Nishi Alexis MD       Assessment: 77yo F with PMH HTN presented to the ED 12/24/2022 with chest pain. She was found to have an NSTEMI. LHC demonstrated severe MV CAD. CTS was consulted for additional recommendations.     Plan: No emergent surgical intervention, however given her NSTEMI and severe CAD, she will require inpatient CABG during this admission  Given her findings on TTE including moderate pulmonary HTN will request BRISEIDA and LHC, discussed with cardiology  Family requesting Dr. Elon Brunner for surgical intervention  Discussed with Dr. Elon Brunner, will tentatively plan on OR Tuesday PM pending further work-up      Electronically signed by Jazzy Tejeda DO on 12/29/2022 at 4:03 PM

## 2022-12-29 NOTE — PROGRESS NOTES
Pt and son requesting more clarity when pt will be possibly having surgery. Perfect served CTS CNP. Received orders that tentative surgery will be for Tuesday with Dr Willie Elizalde. Also ordered was to notify cards that Dr Willie Elizalde wants pt to have a BRISEIDA and right heart cath tomorrow. Informed Dr Anderson. Pt to have BRISEIDA with Dr Raymond Meneses and right heart cath with Dr Anderson tomorrow. Notified cath lab to add to schedule. Pt and family notified of the above.

## 2022-12-29 NOTE — CONSULTS
Met with patient and discussed our outpatient Phase II Cardiac Rehabilitation program. Reviewed the benefits of cardiac rehabilitation based on their diagnosis and personal risk factors. Patient demonstrates moderate interest in Cardiac Rehabilitation at this time. Cardiac Rehabilitation brochure provided to patient/family. The patient may call [facility] at [phone] for additional information or questions. Contact information for [facility] and other choices close to the patient's residence have been provided in the discharge instructions so that the patient may call and schedule an appointment when cleared by their physician.

## 2022-12-29 NOTE — DISCHARGE INSTRUCTIONS
Cardiac Rehabilitation: Discharge instructions        Cardiac rehabilitation is a program for people who have a heart problem, such as a heart attack, coronary stent placed, heart failure, or a heart valve disease. The program includes exercise, lifestyle changes, education, and emotional support. Cardiac rehab can help you improve the quality of your life through better overall health. It can help you lose weight and feel better about yourself. On your cardiac rehab team, you may have your doctor, a nurse specialist, an exercise physiologist, and a dietitian. They will design your cardiac rehab program specifically for you. You will learn how to reduce your risk for heart problems, how to manage stress, and how to eat a heart-healthy diet. By the end of the program, you will be ready to maintain a healthier lifestyle on your own. Follow-up care is a key part of your treatment and safety. Be sure to make and go to all appointments, and call your doctor if you are having problems. It's also a good idea to know your test results and keep a list of the medicines you take. Please call to schedule your first appointment once you have been cleared by your cardiologist to attend Phase II Outpatient Cardiac Rehabilitation. Cardiac Rehabilitation Options:  [address] [place] [address].                                                                                           Cardiology Services  [place], [address] [facility] [place] Alicia Ramirez [address], [facility]  Hours: M/W/F 7am-7pm & T/Th 7am-12pm                                                  [phone] [phone] Professor Medina 108  Cardiac Rehab  [facility]. [address] [place]  P- [phone]                                                                                         Cardiac Rehabilitation  Hours: M/W/F 7am-6pm                                                                                [place], [address]  [facility]                                                          [phone]  Cardiac Rehabilitation                                                                                   [phone]  [address] [place] [address] Dr, [address]                                                                                        [facility]  [phone]                                                                                          Cardiac Rehabilitation  [phone]                                                                                          [address] [place] [address].  [facility]                                                                                                                        [phone]                                                                                                                        [phone]      Discharge Instructions for Open Heart Surgery    What you will need at home:               * Accurate Scale               * Digital Thermometer               *  Antibacterial Soap                *  Clean Wash Cloths             *  Someone to be with you for one week              DO NOT LIFT, PUSH, OR PULL ANYTHING OVER 5 POUNDS FOR 8 WEEK from the day of surgery. This could prevent your sternum from healing properly. ? When you are given permission from your surgeon to begin lifting again,                     do so gradually. You will need time to build your muscle strength. ? A gallon of milk is more than 5 lbs. you should buy ½ gallons. NEVER SMOKE AGAIN! Absolutely no tobacco products! Do not allow others to smoke around you. Second hand smoke can be just as bad. [employer] has a free program available, call 9-559.855.7459. Activity: See your activity diary in your binder for your walking plan. Plan to walk indoors on days the temperature is below 40? or over 80? or during smog alerts. At first limit your stair climbing to once or twice a day. You may use the handrail for balance only. Do not pull yourself up with it. It is not unusual to feel tired for the first few weeks, but walking builds up your strength. Driving: Do not drive a car or any vehicle for 4 weeks from the day of surgery. You can not drive a truck, tractor or  for 8 weeks from the day of surgery. After 4 weeks, you can drive vehicles with power steering only. Do not drive while on pain medication. Incentive Spirometer:  Continue to use your lung exerciser for the next 4 weeks. Support your chest and cough each time you complete the 10 exercises. Weight:  Weigh yourself every morning. Call the surgeon if you gain 3 pounds or more overnight or 5 pounds in a week. This may be a sign of fluid retention. Medication:    Pain pills are ordered to keep you comfortable and able to increase activity         level. Your need for pain pills should decrease over the next 2 weeks.   For mild pain you take Tylenol, but do not exceed 4000mg of Tylenol a day. Vicodin contains Tylenol,  so watch how much Tylenol (Acetaminophen) you take  Stool Softners: You may have been prescribed Colace (docusate), to help prevent straining with bowel movements. If your bowels have not moved by the second day home, take a laxative of your choice. If no bowel movement by the third day, call your surgeon. If you find you have the opposite problem and your stools are too soft, stop taking the stool softener. Avoid herbal, dietary products and vitamins unless OKd by your surgeon. If on Coumadin monitor Vitamin K intake and keep it consistent. Call during business hours Monday through Friday for medication refills. [phone]      Incision Care:  [facility] YOUR INCISIONS DAILY WITH A CLEAN WASHCLOTH AND ANTIBACTERIAL SOAP. Do not wash your incisions after you have cleansed other parts of your body. You may shower but keep your back to the spray. Avoid hot water, comfortably warm is OK. ? If you went home with a dressing on any incision: Remove the dressing daily     and wash the incision with antibacterial soap and water and pat dry. Only     cover the incision with a dressing if it is draining. Otherwise leave it     uncovered (unless your doctor told you otherwise)  ? No tub baths until your incisions are healed. ? DO NOT APPLY ANYTHING OTHER THAN ANTIBACTERIAL SOAP AND     WATER TO YOUR INCISIONS. Do not apply lotions, creams, ointments,     hydrogen peroxide or powder. ? Keep your incisions CLEAN. Think CLEAN. No one should touch your     incisions without washing their hands first.  Do not let pets touch your incisions     (have incisions covered with clothing when around pets). Keep your heart     pillow clean and off the floor. ? Expect some swelling in the leg with the incision. Keep your legs elevated     above the level of your heart when lying or sitting. ? Wear loose clothing.   For support women should wear a bra. FABIOLA Hose (white stockings): Should be worn during the day and off at night. Someone other than the patient will need to put on and take off the hose. It is too much pulling and tugging for the patient. Expect them to be difficult to put on and they should feel snug. These are usually worn for 2-4 weeks. Wash them by hand to keep their elasticity. Diet:  Eat a low fat, low salt diet. Eat a high fiber diet to avoid constipation and straining during bowel movements (whole grains, raw veggies, fruits)    Diabetics:  Check blood sugars twice a day. Contact your primary care physician if your blood sugar is consistently above 130. Good tissue healing occurs when blood sugars are less than 130. Housework: Do not cut the grass, vacuum, sweep or do any heavy housework. Riding: You may ride in the car for local trips, up to 45 minutes. If it you have to travel further stop every 45 min. Wear your lap and shoulder belts in the car. Place your heart pillow between your chest and the shoulder belt. Sexual Activity: When you can climb 2 flights of stairs without chest pain, shortness of breath or fatigue, it is generally OK to resume sexual activity. Depression: It is not unusual to have feelings of anxiety, fear or depression after surgery. If you need help with these feelings, call your primary care physician. There are medications to help and healing usually occurs sooner is youre not depressed. Heart Valve Replacement:  If you had your heart valve replaced you should receive a card for your wallet. Show ALL your doctors and dentist the card before treatment. You will need to take antibiotics before and after surgery, teeth cleaning etc. for the rest of your life. ? If you get a sore throat or fever over 101? that lasts more than a day or     two call your doctor.   ? If you are exposed to someone with strept throat, then get a sore throat yourself, call your doctor IMMEDIATELY. Any doubts about taking antibiotics before or after a procedure call your surgeons office. When to call the doctor:  [phone]    If you have sudden, severe shortness of breath or shortness of breath while resting. Pain, tenderness, warmth or redness in your calf. Weight gain of 3 pounds in one day or 5 pounds in one week. If your heart rate is below 50 or over 110 beats per minute, or symptoms of fluttering in your chest or irregular pulse. Temperature (taken daily) greater than 101? Rubi Danes If your bowels have not moved by the third day home. Persistent diarrhea, nausea or vomiting. If you are unable to eat, or unable to drink 5 glasses of fluid a day for more than one day. For redness, warmth, tenderness, drainage or swelling of any incision. For ANY chest incision drainage. If you have pain not relieved by pain medication. If you experience the same pain or other symptoms that brought you to the hospital or doctor before surgery. Diabetics:  Call Primary Care Physician for blood sugars consistently above 130. Depression:  Call Primary Care Physician if feelings of depression persist.      If you think something is seriously wrong go to the nearest emergency room!     Call 911 for any EMERGENCY and go to the nearest hospital!  Future Appointments   Date Time Provider Raji Alatorre   2/7/2023  9:00 AM Constance Donato MD CARDIO SURG [facility]   6/20/2023  2:30 PM [facility]   6/20/2023  3:00 PM Luther Mares MD VAS/MED [facility]

## 2022-12-29 NOTE — PROGRESS NOTES
Inpatient Cardiology Progress note     PATIENT IS BEING FOLLOWED FOR: NSTEMI    Alexandra Barnes is a 78 y.o. female known to Dr. Amanda Grant     Patient is 78years old female with history of hypertension and hyperlipidemia who presented to the hospital 12/24/22 with chest pain and shortness of breath, patient was found to have elevated troponin, cardiology was consulted. As per patient patient has been having dyspnea on exertion and exertional chest pain since June, better with rest, symptoms have gotten worse over the last few weeks. 12/23/22,  chest pain was significantly worse, that prompted her to seek medical attention. At the time of the consultation she was chest pain-free: did complain of short of breath described as having to take a deep breath to expand her lungs. Denied any pedal edema, + easy fatigability, no PND, no orthopnea, denied lightheadedness, palpitations, presyncope or syncope. N.B. Patient was scheduled for LHC by her primary cardiologist Dr. Amanda Grant 1/3/23    EGD 12/27/22: no GI  bleeding. Mild gastritis    Cath 12/28/22: see below    SUBJECTIVE: Occasional chest tightness. Denies SOB. No overnight issues  OBJECTIVE: No apparent distress     ROS:  Consist: Denies fevers, chills or night sweats  Heart: Denies chest pain, palpitations, lightheadedness, dizziness or syncope  Lungs: Denies SOB, cough, wheezing, orthopnea or PND  GI: Denies abdominal pain, vomiting or diarrhea    PHYSICAL EXAM:   /63   Pulse 73   Temp 97.4 °F (36.3 °C) (Temporal)   Resp 18   Ht 5' 4\" (1.626 m)   Wt 149 lb (67.6 kg)   SpO2 95%   BMI 25.58 kg/m²    B/P Range last 24 hours: Systolic (51SYX), NRD:697 , Min:117 , YCS:089    Diastolic (50DAV), MFT:31, Min:58, Max:104    CONST: Well developed, well nourished female who appears of stated age. Awake, alert and cooperative.  No apparent distress  HEENT:   Head- Normocephalic, atraumatic   Eyes- Conjunctivae pink, anicteric  Throat- Oral mucosa pink and moist  Neck-  No stridor, trachea midline, no jugular venous distention. No carotid bruit  CHEST: Chest symmetrical and non-tender to palpation. No accessory muscle use or intercostal retractions  RESPIRATORY:  Lung sounds - clear throughout fields   CARDIOVASCULAR:     Heart Inspection- shows no noted pulsations  Heart Palpation- no heaves or thrills; PMI is non-displaced   Heart Ausculation- Regular rate and rhythm, no murmur. No s3, s4 or rub   PV: No lower extremity edema. No varicosities. Pedal pulses palpable, no clubbing or cyanosis   ABDOMEN: Soft, non-tender to light palpation. Bowel sounds present. No palpable masses no organomegaly; no abdominal bruit  MS: Good muscle strength and tone. No atrophy or abnormal movements. : Deferred  SKIN: Warm and dry no statis dermatitis or ulcers   NEURO / PSYCH: Oriented to person, place and time. Speech clear and appropriate. Follows all commands.  Pleasant affect       Intake/Output Summary (Last 24 hours) at 12/29/2022 1101  Last data filed at 12/29/2022 0823  Gross per 24 hour   Intake 980.26 ml   Output 550 ml   Net 430.26 ml       Weight:   Wt Readings from Last 3 Encounters:   12/29/22 149 lb (67.6 kg)   12/14/22 155 lb (70.3 kg)   06/14/22 162 lb (73.5 kg)     Current Inpatient Medications:   isosorbide mononitrate  60 mg Oral Daily    lactulose  20 g Oral BID    pantoprazole  40 mg Oral QAM AC    metoprolol succinate  50 mg Oral BID    amLODIPine  10 mg Oral Daily    zolpidem  10 mg Oral Nightly    sodium chloride flush  5-40 mL IntraVENous 2 times per day    aspirin  81 mg Oral Daily    simvastatin  40 mg Oral Nightly       IV Infusions (if any):   sodium chloride 20 mL/hr at 12/28/22 2020    heparin (PORCINE) Infusion 12 Units/kg/hr (12/28/22 2020)    sodium chloride         DIAGNOSTIC/ LABORATORY DATA:  Labs:   CBC:   Recent Labs     12/28/22  0614 12/29/22  0515   WBC 6.0 6.4   HGB 9.9* 10.6*   HCT 30.5* 32.2*    301     BMP:   Recent Labs 12/28/22  0614 12/29/22  0515    143   K 4.0 4.1   CO2 26 23   BUN 12 11   CREATININE 0.7 0.7   LABGLOM >60 >60   CALCIUM 10.0 10.6*         APTT:  Recent Labs     12/28/22  0614 12/29/22  0515   APTT 59.5* 54.3*     CARDIAC ENZYMES:  No results for input(s): CKTOTAL, CKMB, CKMBINDEX, TROPHS in the last 72 hours. FASTING LIPID PANEL:  Lab Results   Component Value Date/Time    CHOL 147 12/25/2022 05:13 AM    HDL 63 12/25/2022 05:13 AM    LDLCALC 61 12/25/2022 05:13 AM    TRIG 113 12/25/2022 05:13 AM           CXR 1/24/22:   Cardiomegaly with  patchy perihilar and bibasilar infiltrates and effusions likely CHF/edema and or pneumonia. Soft tissue prominence in the right hilum. Consider surveillance preferably by CT scan. 12 lead EKG 12/24/22:  Sinus tachycardia  Possible Left atrial enlargement  Cannot rule out Inferior infarct , age undetermined  ST & T wave abnormality, consider anterolateral ischemia    Telemetry: SR in the 80's    Echo 12/26/22 ( Dr. Lenny Ellison ):   Summary   The left ventricle is mildly dilated. There is apical wall akinesis, distal septal dyskinesis and distal   inferior wall hypokinesis with thinning corresponding thinning of the   myocardium. Ejection fraction is visually estimated at 40%. There is doppler evidence of stage I diastolic dysfunction. Normal right ventricular size and function. Normal size atria. Mild mitral regurgitation. Mild tricuspid regurgitation. Moderate pulmonary hypertension. Cath 12/28/22 ( Dr. Lenny Ellison ):  PRESSURES:  Aorta 114/55 with a mean of 80. Left ventricular systolic pressure 959, left ventricular end-diastolic  pressure 26. There was no significant gradient across the aortic valve. CORONARY ANGIOGRAPHY:  Left main:  The left main artery has an eccentric 90% distal vessel  stenosis just before the origins of the left anterior descending artery  and the left circumflex.      LAD:  The left anterior descending artery is calcified in its proximal  segment. The proximal LAD is diffusely diseased with up to 95% luminal  narrowing. There is also 80 to 90% disease of the LAD after the  diagonal branch in the middle segment. The distal LAD after this lesion  over a long segment did not appear to have any significant angiographic  disease. The diagonal branch itself did not appear to have any  significant angiographic disease. LCX:  The left circumflex is a large but nondominant vessel. It gives  rise to two small first and second marginal branches. There is 50%  narrowing in the mid left circumflex before a large marginal branch and  the terminal lateral branch. RCA:  The right coronary artery is a dominant vessel providing the  posterior descending artery branch and is occluded at the mid vessel  level with the distal vessel filling from right-to-right collaterals. LEFT VENTRICULOGRAPHY:  The left ventricle is dilated. There was  moderate generalized hypokinesis more so involving the left ventricular  apex with an estimated ejection fraction of 35 to 40%. CONCLUSIONS:  1. Severe multivessel coronary artery disease as described above. 2.  Dilated left ventricle with an estimated ejection fraction of 35 to  40%. 3.  Elevated left ventricular end-diastolic pressure. ASSESSMENT:   1.  Non-ST elevation MI, remains CP free  2. Ischemic cardiomyopathy with moderate LV systolic dysfunction ( EF ~ 40% on Echo 12/26/22 )  3. Acute HFrEF  4. Mild MR, Mild TR and moderate pulmonary HTN on Echo 12/26/22  5. Hypertension: Controlled   6. Hyperlipidemia: Simvastatin    7. Anemia. H&H stable. EGD 12/27/22: small hiatal hernia with mild antral gastritis --> per general surgery: \" .EGD ok, no upper GIB, highly unlikely to be LGIB. [id number] Mahsa Carver for cardiac catheterization and DATP if indicated \". PLAN:  Awaiting CT surgery consult  Continue current cardiac medications for now   3.    Will need to be on ACE I therapy and spironolactone prior to discharge ( after CABG )   4.    Will follow    Electronically signed by Manda Hughes MD on 12/29/2022 at 11:01 AM

## 2022-12-29 NOTE — CARE COORDINATION
Social Work Discharge Planning:  SW met with patient at bedside. Patient and granddaughter live together in a 2 story home. Patient independent prior to admission. She has no hx with HHC or SNF. PCP is Dr. Jessica Macias and pharmacy is [facility] in [place]. Patient son will transport home, anticipate no needs. SW will continue to follow and assist with transition of care.   Electronically signed by BRIDGER Puente on 12/29/2022 at 9:55 AM

## 2022-12-29 NOTE — PROGRESS NOTES
Hospitalist Progress Note      PCP: Nelda Nunes MD    Date of Admission: 12/24/2022        Hospital Course:  HAD BEEN HAVING CHEST PAIN, AND WAS FOR A HEART CATH AFTER THE 1ST OF THE YEAR.   IT BECAME WORSE AND SHE CAME TO THE ER, FOUND TO HAVE A NSTEMI **    FOR EGD TODAY, DUE TO DECREASING HGB  **IT SHOWED GASTRITIS**  HAS 3 V DISEASE** AWAITING CTS CONSULT           Subjective:   HAVING CHEST PAIN AT REST           Medications:  Reviewed    Infusion Medications    sodium chloride 20 mL/hr at 12/28/22 2020    heparin (PORCINE) Infusion 12 Units/kg/hr (12/28/22 2020)    sodium chloride       Scheduled Medications    isosorbide mononitrate  60 mg Oral Daily    lactulose  20 g Oral BID    pantoprazole  40 mg Oral QAM AC    metoprolol succinate  50 mg Oral BID    amLODIPine  10 mg Oral Daily    zolpidem  10 mg Oral Nightly    sodium chloride flush  5-40 mL IntraVENous 2 times per day    aspirin  81 mg Oral Daily    simvastatin  40 mg Oral Nightly     PRN Meds: nitroGLYCERIN, heparin (porcine), heparin (porcine), sodium chloride flush, sodium chloride, ondansetron **OR** ondansetron, acetaminophen **OR** acetaminophen, polyethylene glycol, nitroGLYCERIN, magnesium sulfate, potassium chloride **OR** potassium alternative oral replacement **OR** potassium chloride, ALPRAZolam      Intake/Output Summary (Last 24 hours) at 12/29/2022 1441  Last data filed at 12/29/2022 1323  Gross per 24 hour   Intake 1100.26 ml   Output 550 ml   Net 550.26 ml       Exam:    /69   Pulse 78   Temp 98 °F (36.7 °C) (Oral)   Resp 19   Ht 5' 4\" (1.626 m)   Wt 149 lb (67.6 kg)   SpO2 95%   BMI 25.58 kg/m²   Gen:  WELL DEVELOPED  HEENT: NC/AT, moist mucous membranes, no oropharyngeal erythema or exudate  Neck: supple, trachea midline, no anterior cervical or SC LAD  Heart:  Normal s1/s2, RRR,  Lungs:  CTA  bilaterally,  Abd: bowel sounds present, soft, nontender, nondistended, no masses  Extrem:  No clubbing, cyanosis, NO  edema  Skin: no rashes or lesions  Psych: A & O x3  Neuro: grossly intact, moves all four extremities. Labs:   Recent Labs     12/27/22  0452 12/28/22  0614 12/29/22  0515   WBC 7.3 6.0 6.4   HGB 10.3* 9.9* 10.6*   HCT 31.1* 30.5* 32.2*    275 301     Recent Labs     12/27/22  0452 12/28/22  0614 12/29/22  0515    141 143   K 3.9 4.0 4.1    105 107   CO2 26 26 23   BUN 12 12 11   CREATININE 0.7 0.7 0.7   CALCIUM 10.0 10.0 10.6*     No results for input(s): AST, ALT, BILIDIR, BILITOT, ALKPHOS in the last 72 hours. No results for input(s): INR in the last 72 hours. No results for input(s): Donne Anderson in the last 72 hours. No results for input(s): AST, ALT, ALB, BILIDIR, BILITOT, ALKPHOS in the last 72 hours. No results for input(s): LACTA in the last 72 hours. No results found for: Velna Gilding  No results found for: AMMONIA    Assessment:  SEVERE 3 VESSEL DISEASE  CONSTIPATION   ANEMIA( iron def)  Plan:   CTS CONSULT  HEPARIN   ZOCOR   NORVASC   LACTULOSE  DVT Prophylaxis:  HEPARIN  Diet: ADULT DIET; Regular;  No Caffeine  Diet NPO Exceptions are: Sips of Water with Meds  Code Status: Full Code     PT/OT Eval Status:  ORDERED     Dispo -  HOME  SEVERE 3 VESSEL DISEASE    Plan:  Electronically signed by Mellisa Hilliard DO on 12/29/2022 at 2:41 PM [facility]

## 2022-12-30 ENCOUNTER — APPOINTMENT (OUTPATIENT)
Dept: CARDIAC CATH/INVASIVE PROCEDURES | Age: 77
DRG: 233 | End: 2022-12-30
Payer: MEDICARE

## 2022-12-30 ENCOUNTER — ANESTHESIA EVENT (OUTPATIENT)
Dept: CARDIAC CATH/INVASIVE PROCEDURES | Age: 77
End: 2022-12-30
Payer: MEDICARE

## 2022-12-30 ENCOUNTER — ANESTHESIA (OUTPATIENT)
Dept: CARDIAC CATH/INVASIVE PROCEDURES | Age: 77
End: 2022-12-30
Payer: MEDICARE

## 2022-12-30 PROBLEM — I25.10 CAD (CORONARY ARTERY DISEASE): Status: ACTIVE | Noted: 2022-12-24

## 2022-12-30 LAB
APTT: 45.6 SEC (ref 24.5–35.1)
APTT: 50.8 SEC (ref 24.5–35.1)
ORGANISM: ABNORMAL

## 2022-12-30 PROCEDURE — 3700000000 HC ANESTHESIA ATTENDED CARE

## 2022-12-30 PROCEDURE — 93451 RIGHT HEART CATH: CPT

## 2022-12-30 PROCEDURE — 4A023N6 MEASUREMENT OF CARDIAC SAMPLING AND PRESSURE, RIGHT HEART, PERCUTANEOUS APPROACH: ICD-10-PCS | Performed by: INTERNAL MEDICINE

## 2022-12-30 PROCEDURE — 6370000000 HC RX 637 (ALT 250 FOR IP): Performed by: INTERNAL MEDICINE

## 2022-12-30 PROCEDURE — 6360000002 HC RX W HCPCS: Performed by: INTERNAL MEDICINE

## 2022-12-30 PROCEDURE — 3700000001 HC ADD 15 MINUTES (ANESTHESIA)

## 2022-12-30 PROCEDURE — C1751 CATH, INF, PER/CENT/MIDLINE: HCPCS

## 2022-12-30 PROCEDURE — 2709999900 HC NON-CHARGEABLE SUPPLY

## 2022-12-30 PROCEDURE — 36415 COLL VENOUS BLD VENIPUNCTURE: CPT

## 2022-12-30 PROCEDURE — 2580000003 HC RX 258: Performed by: NURSE ANESTHETIST, CERTIFIED REGISTERED

## 2022-12-30 PROCEDURE — 6360000002 HC RX W HCPCS: Performed by: NURSE ANESTHETIST, CERTIFIED REGISTERED

## 2022-12-30 PROCEDURE — 99999 PR OFFICE/OUTPT VISIT,PROCEDURE ONLY: CPT | Performed by: INTERNAL MEDICINE

## 2022-12-30 PROCEDURE — 2140000000 HC CCU INTERMEDIATE R&B

## 2022-12-30 PROCEDURE — 93325 DOPPLER ECHO COLOR FLOW MAPG: CPT

## 2022-12-30 PROCEDURE — 93312 ECHO TRANSESOPHAGEAL: CPT

## 2022-12-30 PROCEDURE — 2580000003 HC RX 258: Performed by: INTERNAL MEDICINE

## 2022-12-30 PROCEDURE — 6360000002 HC RX W HCPCS

## 2022-12-30 PROCEDURE — 93308 TTE F-UP OR LMTD: CPT

## 2022-12-30 PROCEDURE — 99233 SBSQ HOSP IP/OBS HIGH 50: CPT | Performed by: INTERNAL MEDICINE

## 2022-12-30 PROCEDURE — 93321 DOPPLER ECHO F-UP/LMTD STD: CPT

## 2022-12-30 PROCEDURE — 2500000003 HC RX 250 WO HCPCS

## 2022-12-30 PROCEDURE — 85730 THROMBOPLASTIN TIME PARTIAL: CPT

## 2022-12-30 PROCEDURE — C1894 INTRO/SHEATH, NON-LASER: HCPCS

## 2022-12-30 RX ORDER — PROPOFOL 10 MG/ML
INJECTION, EMULSION INTRAVENOUS PRN
Status: DISCONTINUED | OUTPATIENT
Start: 2022-12-30 | End: 2022-12-30 | Stop reason: SDUPTHER

## 2022-12-30 RX ORDER — SODIUM CHLORIDE 9 MG/ML
INJECTION, SOLUTION INTRAVENOUS CONTINUOUS PRN
Status: DISCONTINUED | OUTPATIENT
Start: 2022-12-30 | End: 2022-12-30 | Stop reason: SDUPTHER

## 2022-12-30 RX ORDER — HEPARIN SODIUM 10000 [USP'U]/100ML
5-30 INJECTION, SOLUTION INTRAVENOUS CONTINUOUS
Status: DISCONTINUED | OUTPATIENT
Start: 2022-12-30 | End: 2023-01-03

## 2022-12-30 RX ORDER — SPIRONOLACTONE 25 MG/1
25 TABLET ORAL DAILY
Status: DISCONTINUED | OUTPATIENT
Start: 2022-12-30 | End: 2023-01-03

## 2022-12-30 RX ORDER — AMLODIPINE BESYLATE 5 MG/1
5 TABLET ORAL DAILY
Status: DISCONTINUED | OUTPATIENT
Start: 2022-12-31 | End: 2023-01-03

## 2022-12-30 RX ORDER — FUROSEMIDE 20 MG/1
20 TABLET ORAL DAILY
Status: DISCONTINUED | OUTPATIENT
Start: 2022-12-30 | End: 2023-01-03

## 2022-12-30 RX ORDER — LISINOPRIL 5 MG/1
5 TABLET ORAL DAILY
Status: DISCONTINUED | OUTPATIENT
Start: 2022-12-30 | End: 2022-12-31

## 2022-12-30 RX ADMIN — PROPOFOL 50 MG: 10 INJECTION, EMULSION INTRAVENOUS at 08:55

## 2022-12-30 RX ADMIN — HEPARIN SODIUM 12 UNITS/KG/HR: 10000 INJECTION, SOLUTION INTRAVENOUS at 16:26

## 2022-12-30 RX ADMIN — SPIRONOLACTONE 25 MG: 25 TABLET ORAL at 12:54

## 2022-12-30 RX ADMIN — PROPOFOL 25 MG: 10 INJECTION, EMULSION INTRAVENOUS at 09:02

## 2022-12-30 RX ADMIN — ZOLPIDEM TARTRATE 10 MG: 5 TABLET ORAL at 20:21

## 2022-12-30 RX ADMIN — ALPRAZOLAM 0.25 MG: 0.25 TABLET ORAL at 04:53

## 2022-12-30 RX ADMIN — PROPOFOL 25 MG: 10 INJECTION, EMULSION INTRAVENOUS at 09:05

## 2022-12-30 RX ADMIN — METOPROLOL SUCCINATE 50 MG: 50 TABLET, EXTENDED RELEASE ORAL at 12:00

## 2022-12-30 RX ADMIN — ISOSORBIDE MONONITRATE 60 MG: 30 TABLET, EXTENDED RELEASE ORAL at 12:00

## 2022-12-30 RX ADMIN — SODIUM CHLORIDE: 9 INJECTION, SOLUTION INTRAVENOUS at 16:35

## 2022-12-30 RX ADMIN — ASPIRIN 81 MG CHEWABLE TABLET 81 MG: 81 TABLET CHEWABLE at 12:54

## 2022-12-30 RX ADMIN — LISINOPRIL 5 MG: 5 TABLET ORAL at 12:53

## 2022-12-30 RX ADMIN — METOPROLOL SUCCINATE 50 MG: 50 TABLET, EXTENDED RELEASE ORAL at 20:21

## 2022-12-30 RX ADMIN — PROPOFOL 50 MG: 10 INJECTION, EMULSION INTRAVENOUS at 08:53

## 2022-12-30 RX ADMIN — Medication 40 MG: at 21:00

## 2022-12-30 RX ADMIN — PROPOFOL 25 MG: 10 INJECTION, EMULSION INTRAVENOUS at 08:59

## 2022-12-30 RX ADMIN — PANTOPRAZOLE SODIUM 40 MG: 40 TABLET, DELAYED RELEASE ORAL at 04:54

## 2022-12-30 RX ADMIN — ACETAMINOPHEN 650 MG: 325 TABLET ORAL at 20:21

## 2022-12-30 RX ADMIN — HEPARIN SODIUM 2000 UNITS: 1000 INJECTION INTRAVENOUS; SUBCUTANEOUS at 23:46

## 2022-12-30 RX ADMIN — SODIUM CHLORIDE: 9 INJECTION, SOLUTION INTRAVENOUS at 08:35

## 2022-12-30 RX ADMIN — FUROSEMIDE 20 MG: 40 TABLET ORAL at 12:53

## 2022-12-30 ASSESSMENT — PAIN DESCRIPTION - DESCRIPTORS: DESCRIPTORS: ACHING

## 2022-12-30 ASSESSMENT — PAIN SCALES - GENERAL
PAINLEVEL_OUTOF10: 0
PAINLEVEL_OUTOF10: 9

## 2022-12-30 ASSESSMENT — PAIN DESCRIPTION - LOCATION: LOCATION: HEAD

## 2022-12-30 NOTE — CARE COORDINATION
Social Work Discharge Planning:  Heart cath and BRISEIDA today and possible open heart Tuesday. Discharge plan remains undetermined at this time. SW will continue to follow and assist with transition of care.   Electronically signed by Annmarie Carroll on 12/30/2022 at 10:10 AM

## 2022-12-30 NOTE — PROGRESS NOTES
Inpatient Cardiology Progress Note     PATIENT IS BEING FOLLOWED FOR: NSTEMI    Rosina Montaño is a 78 y.o. female known to Dr. Ramiro Jackson     Patient is 78years old female with history of hypertension and hyperlipidemia who presented to the hospital 12/24/22 with chest pain and shortness of breath, patient was found to have elevated troponin, cardiology was consulted. As per patient patient has been having dyspnea on exertion and exertional chest pain since June, better with rest, symptoms have gotten worse over the last few weeks. 12/23/22,  chest pain was significantly worse, that prompted her to seek medical attention. At the time of the consultation she was chest pain-free: did complain of short of breath described as having to take a deep breath to expand her lungs. Denied any pedal edema, + easy fatigability, no PND, no orthopnea, denied lightheadedness, palpitations, presyncope or syncope. N.B. Patient was scheduled for C by her primary cardiologist Dr. Ramiro Jackson 1/3/23    EGD 12/27/22: no GI  bleeding. Mild gastritis    Cath 12/28/22: see below    SUBJECTIVE: Occasional chest tightness. Denies SOB. No overnight issues  OBJECTIVE: No apparent distress     ROS:  Consist: Denies fevers, chills or night sweats  Heart: Denies chest pain, palpitations, lightheadedness, dizziness or syncope  Lungs: Denies SOB, cough, wheezing, orthopnea or PND  GI: Denies abdominal pain, vomiting or diarrhea    PHYSICAL EXAM:   BP (!) 163/87   Pulse 81   Temp 97.7 °F (36.5 °C) (Temporal)   Resp 16   Ht 5' 4\" (1.626 m)   Wt 147 lb (66.7 kg)   SpO2 94%   BMI 25.23 kg/m²    B/P Range last 24 hours: Systolic (57NTA), SZT:714 , Min:116 , TGJ:744    Diastolic (89ZZR), FEP:46, Min:61, Max:87    CONST: Well developed, well nourished female who appears of stated age. Awake, alert and cooperative.  No apparent distress  HEENT:   Head- Normocephalic, atraumatic   Eyes- Conjunctivae pink, anicteric  Throat- Oral mucosa pink and moist  Neck-  No stridor, trachea midline, no jugular venous distention. No carotid bruit  CHEST: Chest symmetrical and non-tender to palpation. No accessory muscle use or intercostal retractions  RESPIRATORY:  Lung sounds - clear throughout fields   CARDIOVASCULAR:     Heart Inspection- shows no noted pulsations  Heart Palpation- no heaves or thrills; PMI is non-displaced   Heart Ausculation- Regular rate and rhythm, no murmur. No s3, s4 or rub   PV: No lower extremity edema. No varicosities. Pedal pulses palpable, no clubbing or cyanosis   ABDOMEN: Soft, non-tender to light palpation. Bowel sounds present. No palpable masses no organomegaly; no abdominal bruit  MS: Good muscle strength and tone. No atrophy or abnormal movements. : Deferred  SKIN: Warm and dry no statis dermatitis or ulcers   NEURO / PSYCH: Oriented to person, place and time. Speech clear and appropriate. Follows all commands.  Pleasant affect       Intake/Output Summary (Last 24 hours) at 12/30/2022 0942  Last data filed at 12/30/2022 0906  Gross per 24 hour   Intake 1073.97 ml   Output --   Net 1073.97 ml       Weight:   Wt Readings from Last 3 Encounters:   12/30/22 147 lb (66.7 kg)   12/14/22 155 lb (70.3 kg)   06/14/22 162 lb (73.5 kg)     Current Inpatient Medications:   isosorbide mononitrate  60 mg Oral Daily    lactulose  20 g Oral BID    pantoprazole  40 mg Oral QAM AC    metoprolol succinate  50 mg Oral BID    amLODIPine  10 mg Oral Daily    zolpidem  10 mg Oral Nightly    sodium chloride flush  5-40 mL IntraVENous 2 times per day    aspirin  81 mg Oral Daily    simvastatin  40 mg Oral Nightly       IV Infusions (if any):   sodium chloride 20 mL/hr at 12/29/22 2132    heparin (PORCINE) Infusion 12 Units/kg/hr (12/29/22 2132)    sodium chloride         DIAGNOSTIC/ LABORATORY DATA:  Labs:   CBC:   Recent Labs     12/28/22  0614 12/29/22  0515   WBC 6.0 6.4   HGB 9.9* 10.6*   HCT 30.5* 32.2*    301     BMP:   Recent Labs 12/28/22  0614 12/29/22  0515    143   K 4.0 4.1   CO2 26 23   BUN 12 11   CREATININE 0.7 0.7   LABGLOM >60 >60   CALCIUM 10.0 10.6*         APTT:  Recent Labs     12/29/22  0515 12/30/22  0507   APTT 54.3* 50.8*     CARDIAC ENZYMES:  No results for input(s): CKTOTAL, CKMB, CKMBINDEX, TROPHS in the last 72 hours. FASTING LIPID PANEL:  Lab Results   Component Value Date/Time    CHOL 147 12/25/2022 05:13 AM    HDL 63 12/25/2022 05:13 AM    LDLCALC 61 12/25/2022 05:13 AM    TRIG 113 12/25/2022 05:13 AM           CXR 1/24/22:   Cardiomegaly with  patchy perihilar and bibasilar infiltrates and effusions likely CHF/edema and or pneumonia. Soft tissue prominence in the right hilum. Consider surveillance preferably by CT scan. 12 lead EKG 12/24/22:  Sinus tachycardia  Possible Left atrial enlargement  Cannot rule out Inferior infarct , age undetermined  ST & T wave abnormality, consider anterolateral ischemia    Telemetry: SR in the 80's    Echo 12/26/22 ( Dr. Gema Hernandez ):   Summary   The left ventricle is mildly dilated. There is apical wall akinesis, distal septal dyskinesis and distal   inferior wall hypokinesis with thinning corresponding thinning of the   myocardium. Ejection fraction is visually estimated at 40%. There is doppler evidence of stage I diastolic dysfunction. Normal right ventricular size and function. Normal size atria. Mild mitral regurgitation. Mild tricuspid regurgitation. Moderate pulmonary hypertension. Cath 12/28/22 ( Dr. Gema Hernandez ):  PRESSURES:  Aorta 114/55 with a mean of 80. Left ventricular systolic pressure 129, left ventricular end-diastolic  pressure 26. There was no significant gradient across the aortic valve. CORONARY ANGIOGRAPHY:  Left main:  The left main artery has an eccentric 90% distal vessel  stenosis just before the origins of the left anterior descending artery  and the left circumflex.      LAD:  The left anterior descending artery is calcified in its proximal  segment. The proximal LAD is diffusely diseased with up to 95% luminal  narrowing. There is also 80 to 90% disease of the LAD after the  diagonal branch in the middle segment. The distal LAD after this lesion  over a long segment did not appear to have any significant angiographic  disease. The diagonal branch itself did not appear to have any  significant angiographic disease. LCX:  The left circumflex is a large but nondominant vessel. It gives  rise to two small first and second marginal branches. There is 50%  narrowing in the mid left circumflex before a large marginal branch and  the terminal lateral branch. RCA:  The right coronary artery is a dominant vessel providing the  posterior descending artery branch and is occluded at the mid vessel  level with the distal vessel filling from right-to-right collaterals. LEFT VENTRICULOGRAPHY:  The left ventricle is dilated. There was  moderate generalized hypokinesis more so involving the left ventricular  apex with an estimated ejection fraction of 35 to 40%. CONCLUSIONS:  1. Severe multivessel coronary artery disease as described above. 2.  Dilated left ventricle with an estimated ejection fraction of 35 to  40%. 3.  Elevated left ventricular end-diastolic pressure. ASSESSMENT:   1.  Non-ST elevation MI, remains CP free  2. Ischemic cardiomyopathy with moderate LV systolic dysfunction ( EF ~ 40% on Echo 12/26/22 )  3. Acute HFrEF  4. Mild MR, Mild TR and moderate pulmonary HTN on Echo 12/26/22  5. Hypertension: Controlled   6. Hyperlipidemia: Simvastatin    7. Anemia. H&H stable. EGD 12/27/22: small hiatal hernia with mild antral gastritis --> per general surgery: \" .EGD ok, no upper GIB, highly unlikely to be LGIB. [id number] [address] Carver for cardiac catheterization and DATP if indicated \". PLAN:  For BRISEIDA and right heart cath today ( per CT surgery request )  Decrease Amlodipine to 5 mg daily   3.    Start ACE I therapy, spironolactone and PO lasix. Monitor BP and BMP   4. Rest of cardiac medications same   5.    Will continue to follow    Electronically signed by Jarvsi Garcia MD on 12/30/2022 at 9:42 AM

## 2022-12-30 NOTE — ANESTHESIA PRE PROCEDURE
Department of Anesthesiology  Preprocedure Note       Name:  Tammie Pleitez   Age:  78 y.o.  :  1945                                          MRN:  [id number]         Date:  2022      Surgeon:     Procedure: BRISEIDA    Medications prior to admission:   Prior to Admission medications    Medication Sig Start Date End Date Taking?  Authorizing Provider   losartan (COZAAR) 50 MG tablet Take 50 mg by mouth at bedtime 22   Historical Provider, MD   amLODIPine (NORVASC) 10 MG tablet Take 1 tablet by mouth daily  Patient not taking: Reported on 2022   Sedrick Leonard MD   amLODIPine (NORVASC) 10 MG tablet Take 1 tablet by mouth daily  Patient not taking: Reported on 2022   Sedrick Leonard MD   Cholecalciferol (VITAMIN D) 125 MCG (5000 UT) CAPS Take 5,000 Units by mouth daily  Patient not taking: Reported on 2022    Historical Provider, MD   vitamin E 1000 units capsule Take 1,000 Units by mouth daily    Historical Provider, MD   Calcium Carb-Cholecalciferol (CALCIUM 1000 + D) 1000-800 MG-UNIT TABS Take by mouth daily  Patient not taking: Reported on 2022    Historical Provider, MD   MAGNESIUM CHLORIDE-CALCIUM PO Take by mouth daily  Patient not taking: Reported on 2022    Historical Provider, MD   PROMETHAZINE HCL PO Take by mouth as needed    Historical Provider, MD   DEXTROMETHORPHAN HBR PO Take by mouth as needed    Historical Provider, MD   simvastatin (ZOCOR) 40 MG tablet Take 40 mg by mouth nightly    Historical Provider, MD   isosorbide mononitrate (IMDUR) 30 MG extended release tablet TAKE 1 TABLET DAILY  Patient not taking: Reported on 2022   Sedrick Leonard MD   ibuprofen (ADVIL;MOTRIN) 800 MG tablet Take 800 mg by mouth as needed  3/12/17   Historical Provider, MD   aspirin-acetaminophen-caffeine ([address] [place] [address]) [phone] MG per tablet Take 1 tablet by mouth every 6 hours as needed for Headaches    Historical Provider, MD   zolpidem (AMBIEN) 10 MG tablet Take 10 mg by mouth nightly.      Historical Provider, MD   traZODone HCl 150 MG TB24 Take 75 mg by mouth nightly     Historical Provider, MD       Current medications:    Current Facility-Administered Medications   Medication Dose Route Frequency Provider Last Rate Last Admin    0.9 % sodium chloride infusion   IntraVENous Continuous Ramakrishna Valderrama MD 20 mL/hr at 12/29/22 2132 Rate Verify at 12/29/22 2132    isosorbide mononitrate (IMDUR) extended release tablet 60 mg  60 mg Oral Daily Ramakrishna Valderrama MD   60 mg at 12/29/22 0913    lactulose (CHRONULAC) 10 GM/15ML solution 20 g  20 g Oral BID Ramakrishna Valderrama MD   20 g at 12/28/22 1318    pantoprazole (PROTONIX) tablet 40 mg  40 mg Oral QAM AC Ramakrishna Valderrama MD   40 mg at 12/30/22 0454    metoprolol succinate (TOPROL XL) extended release tablet 50 mg  50 mg Oral BID Ramakrishna Valderrama MD   50 mg at 12/29/22 2129    nitroGLYCERIN (NITROSTAT) SL tablet 0.4 mg  0.4 mg SubLINGual Q5 Min PRN Ramakrishna Valderrama MD   0.4 mg at 12/24/22 1505    heparin (porcine) injection 4,000 Units  4,000 Units IntraVENous PRN Ramakrishna Valderrama MD        heparin (porcine) injection 2,000 Units  2,000 Units IntraVENous PRN Ramakrishna Valderrama MD        heparin 25,000 units in dextrose 5% 250 mL (premix) infusion  5-30 Units/kg/hr IntraVENous Continuous Ramakrishna Valderrama MD 8.4 mL/hr at 12/29/22 2132 12 Units/kg/hr at 12/29/22 2132    amLODIPine (NORVASC) tablet 10 mg  10 mg Oral Daily Ramakrishna Valderrama MD   10 mg at 12/29/22 0913    zolpidem (AMBIEN) tablet 10 mg  10 mg Oral Nightly Ramakrishna Valderrama MD   10 mg at 12/29/22 2129    sodium chloride flush 0.9 % injection 5-40 mL  5-40 mL IntraVENous 2 times per day Ramakrishna Valderrama MD   10 mL at 12/29/22 0913    sodium chloride flush 0.9 % injection 5-40 mL  5-40 mL IntraVENous PRN Ramakrishna Valderrama MD   10 mL at 12/24/22 1812    0.9 % sodium chloride infusion   IntraVENous PRN Ramakrishna Valderrama MD        ondansetron (ZOFRAN-ODT) disintegrating tablet 4 mg  4 mg Oral Q8H PRN Aniya Walker MD        Or    ondansetron [facility]) injection 4 mg  4 mg IntraVENous Q6H PRN Aniya Walker MD        acetaminophen (TYLENOL) tablet 650 mg  650 mg Oral Q6H PRN Aniya Walker MD   650 mg at 12/26/22 0801    Or    acetaminophen (TYLENOL) suppository 650 mg  650 mg Rectal Q6H PRN Aniya Walker MD        polyethylene glycol (GLYCOLAX) packet 17 g  17 g Oral Daily PRN Aniya Walker MD        aspirin chewable tablet 81 mg  81 mg Oral Daily Aniya Walkre MD   81 mg at 12/29/22 0913    nitroGLYCERIN (NITROSTAT) SL tablet 0.4 mg  0.4 mg SubLINGual Q5 Min PRN Aniya Walker MD        magnesium sulfate 2000 mg in 50 mL IVPB premix  2,000 mg IntraVENous PRN Aniya Walker MD        potassium chloride (KLOR-CON M) extended release tablet 40 mEq  40 mEq Oral PRN Aniya Walker MD        Or    potassium bicarb-citric acid (EFFER-K) effervescent tablet 40 mEq  40 mEq Oral PRN Aniya Walker MD        Or    potassium chloride 10 mEq/100 mL IVPB (Peripheral Line)  10 mEq IntraVENous PRN Aniya Walker MD        simvastatin (ZOCOR) tablet 40 mg  (Patient Supplied)  40 mg Oral Nightly Aniya Walker MD   40 mg at 12/29/22 2129    ALPRAZolam (XANAX) tablet 0.25 mg  0.25 mg Oral TID PRN Aniya Walker MD   0.25 mg at 12/30/22 0453       Allergies:     Allergies   Allergen Reactions    Lipitor [Atorvastatin]      Caused my bones to ache        Problem List:    Patient Active Problem List   Diagnosis Code    Bilateral carotid artery stenosis I65.23    NSTEMI (non-ST elevated myocardial infarction) (HCC) I21.4    Acute heart failure (HCC) Z59.9    Systolic murmur X51.4    EKG, abnormal R94.31    Abnormal nuclear stress test R94.39    Primary hypertension I10    Hyperlipidemia E78.5    Carotid artery disease (HCC) I77.9    CAD (coronary artery disease) I25.10       Past Medical History:        Diagnosis Date    ARJUN (cerebral atherosclerosis)     History of blood transfusion     Hyperlipidemia     Hypertension        Past Surgical History:        Procedure Laterality Date    COLONOSCOPY      TONSILLECTOMY      UPPER GASTROINTESTINAL ENDOSCOPY N/A 12/27/2022    EGD ESOPHAGOGASTRODUODENOSCOPY performed by Yokasta Orozco MD at [facility] History:    Social History     Tobacco Use    Smoking status: Never    Smokeless tobacco: Never   Substance Use Topics    Alcohol use: No                                Counseling given: Not Answered      Vital Signs (Current):   Vitals:    12/29/22 2307 12/30/22 0318 12/30/22 0502 12/30/22 0710   BP: 116/61 (!) 141/81  124/62   Pulse: 65 76  72   Resp: 18 18  18   Temp: 37.2 °C (99 °F) 36.9 °C (98.4 °F)  36.8 °C (98.2 °F)   TempSrc: Temporal Temporal  Temporal   SpO2: 96% 94%  97%   Weight:   147 lb (66.7 kg)    Height:                                                  BP Readings from Last 3 Encounters:   12/30/22 124/62   12/14/22 138/78   01/13/22 132/76       NPO Status: Time of last liquid consumption: 0800                        Time of last solid consumption: 1800                        Date of last liquid consumption: 12/27/22                        Date of last solid food consumption: 12/26/22    BMI:   Wt Readings from Last 3 Encounters:   12/30/22 147 lb (66.7 kg)   12/14/22 155 lb (70.3 kg)   06/14/22 162 lb (73.5 kg)     Body mass index is 25.23 kg/m².     CBC:   Lab Results   Component Value Date/Time    WBC 6.4 12/29/2022 05:15 AM    RBC 3.65 12/29/2022 05:15 AM    HGB 10.6 12/29/2022 05:15 AM    HCT 32.2 12/29/2022 05:15 AM    MCV 88.2 12/29/2022 05:15 AM    RDW 12.5 12/29/2022 05:15 AM     12/29/2022 05:15 AM       CMP:   Lab Results   Component Value Date/Time     12/29/2022 05:15 AM    K 4.1 12/29/2022 05:15 AM     12/29/2022 05:15 AM    CO2 23 12/29/2022 05:15 AM    BUN 11 12/29/2022 05:15 AM    CREATININE 0.7 12/29/2022 05:15 AM    LABGLOM >60 12/29/2022 05:15 AM GLUCOSE 124 12/29/2022 05:15 AM    PROT 7.4 12/24/2022 03:02 PM    CALCIUM 10.6 12/29/2022 05:15 AM    BILITOT 1.0 12/24/2022 03:02 PM    ALKPHOS 88 12/24/2022 03:02 PM    AST 19 12/24/2022 03:02 PM    ALT 23 12/24/2022 03:02 PM       POC Tests: No results for input(s): POCGLU, POCNA, POCK, POCCL, POCBUN, POCHEMO, POCHCT in the last 72 hours. Coags:   Lab Results   Component Value Date/Time    PROTIME 11.5 12/24/2022 03:02 PM    INR 1.1 12/24/2022 03:02 PM    APTT 50.8 12/30/2022 05:07 AM       HCG (If Applicable): No results found for: PREGTESTUR, PREGSERUM, HCG, HCGQUANT     ABGs: No results found for: PHART, PO2ART, VSQ5PUB, OXE1GLC, BEART, E4AMSFHS     Type & Screen (If Applicable):  No results found for: LABABO, LABRH    Drug/Infectious Status (If Applicable):  No results found for: HIV, HEPCAB    COVID-19 Screening (If Applicable): No results found for: COVID19        Anesthesia Evaluation  Patient summary reviewed and Nursing notes reviewed  Airway: Mallampati: II  TM distance: >3 FB   Neck ROM: full  Mouth opening: > = 3 FB   Dental: normal exam     Comment: Denies any loose dentition    Pulmonary: breath sounds clear to auscultation                             Cardiovascular:    (+) hypertension:, past MI:, CAD:,         Rhythm: regular  Rate: normal                    Neuro/Psych:               GI/Hepatic/Renal:             Endo/Other:                     Abdominal:             Vascular: Other Findings:           Anesthesia Plan      MAC     ASA 3             Anesthetic plan and risks discussed with patient. Plan discussed with attending.                     Elin Kohler APRN - CRNA   12/30/2022

## 2022-12-30 NOTE — PROGRESS NOTES
Comprehensive Nutrition Assessment    Type and Reason for Visit:  Initial, RD Nutrition Re-Screen/LOS    Nutrition Recommendations/Plan:   Continue current diet  Start boost once/day to aid in oral intake  Will monitor     Malnutrition Assessment:  Malnutrition Status:  Insufficient data (12/30/22 1439)    Context:  Acute Illness     Findings of the 6 clinical characteristics of malnutrition:  Energy Intake:  Mild decrease in energy intake (Comment)  Weight Loss:  Unable to assess (SAI wt loss in adequate time frame in acute setting; wt flux this adm)     Body Fat Loss:  Unable to assess (pt in cath lab)     Muscle Mass Loss:  Unable to assess (pt in cath lab)    Fluid Accumulation:  No significant fluid accumulation     Strength:  Not Performed    Nutrition Assessment:    pt adm d/t chest pain/NSTEMI s/p cardiac cath showing severe CAD- will require CABG this adm (anticipating procedure on 1/3); s/p EGD showing mild gastritis; PMhx of ARJUN, HTN; will start ONS to aid in oral intake and monitor. Nutrition Related Findings:    I/O WNL; A&Ox4; abd WNL; trace edema Wound Type: None       Current Nutrition Intake & Therapies:    Average Meal Intake: 51-75% (avg)  Average Supplements Intake: None Ordered  ADULT DIET; Regular; Low Fat/Low Chol/High Fiber/2 gm Na  ADULT ORAL NUTRITION SUPPLEMENT; Lunch; Fortified Pudding Oral Supplement    Anthropometric Measures:  Height: 5' 4\" (162.6 cm)  Ideal Body Weight (IBW): 120 lbs (55 kg)       Current Body Weight: 145 lb 6 oz (65.9 kg) (12/28-standing scale (lowest measured wt this adm)), 121.1 % IBW. Current BMI (kg/m2): 24.9  Usual Body Weight: 159 lb 10 oz (72.4 kg) (1/13/22-actual ; lack of other wt hx)  % Weight Change (Calculated): -8.9  Weight Adjustment For: No Adjustment                 BMI Categories: Normal Weight (BMI 18.5-24. 9)    Estimated Daily Nutrient Needs:  Energy Requirements Based On: Formula  Weight Used for Energy Requirements: Current  Energy (kcal/day): 1285-8077  Weight Used for Protein Requirements: Current  Protein (g/day): 1.2-1.4g/kgxCBW=80-95g  Method Used for Fluid Requirements: 1 ml/kcal  Fluid (ml/day): 2853-4232    Nutrition Diagnosis:   Inadequate oral intake related to cardiac dysfunction (NSTEMI/severe CAD) as evidenced by intake 51-75%    Nutrition Interventions:   Food and/or Nutrient Delivery: Continue Current Diet, Start Oral Nutrition Supplement (boost once/day)  Nutrition Education/Counseling: Education needed (CABG education will be needed once pt s/p CABG)  Coordination of Nutrition Care: Continue to monitor while inpatient       Goals:     Goals: PO intake 75% or greater, by next RD assessment       Nutrition Monitoring and Evaluation:   Behavioral-Environmental Outcomes: None Identified  Food/Nutrient Intake Outcomes: Food and Nutrient Intake, Supplement Intake  Physical Signs/Symptoms Outcomes: Biochemical Data, Nutrition Focused Physical Findings, Skin, Weight, Chewing or Swallowing, GI Status, Fluid Status or Edema    Discharge Planning:     Too soon to determine     Albino Brine, RD  Contact: [phone]

## 2022-12-30 NOTE — PROGRESS NOTES
CC: MVCAD     Brief HPI:  Patient off floor for heart cath and BRISEIDA, will attempt to see later       Past Medical History:   Diagnosis Date    ARJUN (cerebral atherosclerosis)     History of blood transfusion     Hyperlipidemia     Hypertension      Past Surgical History:   Procedure Laterality Date    COLONOSCOPY      TONSILLECTOMY      UPPER GASTROINTESTINAL ENDOSCOPY N/A 12/27/2022    EGD ESOPHAGOGASTRODUODENOSCOPY performed by Jewel Weaver MD at [address] [facility] [address] [place]     Socioeconomic History    Marital status:      Spouse name: Not on file    Number of children: Not on file    Years of education: Not on file    Highest education level: Not on file   Occupational History    Not on file   Tobacco Use    Smoking status: Never    Smokeless tobacco: Never   Vaping Use    Vaping Use: Never used   Substance and Sexual Activity    Alcohol use: No    Drug use: No    Sexual activity: Not on file   Other Topics Concern    Not on file   Social History Narrative    Not on file     Social Determinants of Health     Financial Resource Strain: Not on file   Food Insecurity: Not on file   Transportation Needs: Not on file   Physical Activity: Not on file   Stress: Not on file   Social Connections: Not on file   Intimate Partner Violence: Not on file   Housing Stability: Not on file     History reviewed. No pertinent family history.     Vitals:    12/29/22 2307 12/30/22 0318 12/30/22 0502 12/30/22 0710   BP: 116/61 (!) 141/81  124/62   Pulse: 65 76  72   Resp: 18 18  18   Temp: 99 °F (37.2 °C) 98.4 °F (36.9 °C)  98.2 °F (36.8 °C)   TempSrc: Temporal Temporal  Temporal   SpO2: 96% 94%  97%   Weight:   147 lb (66.7 kg)    Height:               Intake/Output Summary (Last 24 hours) at 12/30/2022 0742  Last data filed at 12/30/2022 0455  Gross per 24 hour   Intake 993.97 ml   Output --   Net 993.97 ml         Recent Labs     12/28/22  0614 12/29/22  0515   WBC 6.0 6.4   HGB 9.9* 10.6*   HCT 30.5* 32.2*    301 Recent Labs     12/28/22  0614 12/29/22  0515   BUN 12 11   CREATININE 0.7 0.7         Creatinine   Date/Time Value Ref Range Status   12/29/2022 05:15 AM 0.7 0.5 - 1.0 mg/dL Final   12/28/2022 06:14 AM 0.7 0.5 - 1.0 mg/dL Final   12/27/2022 04:52 AM 0.7 0.5 - 1.0 mg/dL Final         ROS:   Negative for CP, palpitations, SOB at rest, dizziness/lightheadedness. Physical Exam   Not performed     ASSESSMENT:  Patient Active Problem List   Diagnosis    Bilateral carotid artery stenosis    NSTEMI (non-ST elevated myocardial infarction) ([facility])    Acute heart failure ([facility])    Systolic murmur    EKG, abnormal    Abnormal nuclear stress test    Primary hypertension    Hyperlipidemia    Carotid artery disease ([facility])    CAD (coronary artery disease)             PLAN:  Tentative plan for OR Tue afternoon  For RHC and BRISEIDA today   On Imdur and Heparin         Pre-operative testing review:   --carotids right with no significant stenosis, left with 50-69% stenosis   --shemar with good flow bilaterally  --ct chest reviewed  --TTE with EF of 40%, no significant valvular disease. For BRISEIDA today   --PFTs reviewed, called PFT lab to see if they could provide complete PFT sheet   --hgA1c 5.4        Recent Labs     12/29/22  0515   HGB 10.6*   HCT 32.2*        Recent Labs     12/29/22  0515   BUN 11   CREATININE 0.7     No results found for: LABURIN, LABURIN      [id number] Score for Atrial Fibrillation Stroke Risk   Risk   Factors  Component Value   C CHF  1   H HTN  1   A2 Age >= 75  2   D DM  0   S2 Prior Stroke/TIA  0   V Vascular Disease  1   A Age 74-69  0   Sc Sex  1    [id number]  Score  6             Preoperative NYHA Class: III     Note: 25 minutes was spent providing face-to-face patient care, including:  and coordinating care, reviewing the chart, labs, and diagnostics, as well as medical decision making.  Greater than 50% of this time was spent instructing and counseling the patient face to face regarding findings and recommendations.

## 2022-12-30 NOTE — PROGRESS NOTES
Patient was in for a 215 pm PFT retest but transport called and notified me that patient was on bed rest from earlier procedures RN states pt was come now I asked if she could bring the patient but she could not.

## 2022-12-30 NOTE — PROGRESS NOTES
Hospitalist Progress Note      PCP: Chilo Díaz MD    Date of Admission: 12/24/2022        Hospital Course:  HAD BEEN HAVING CHEST PAIN, AND WAS FOR A HEART CATH AFTER THE 1ST OF THE YEAR.   IT BECAME WORSE AND SHE CAME TO THE ER, FOUND TO HAVE A NSTEMI **    FOR EGD TODAY, DUE TO DECREASING HGB  **IT SHOWED GASTRITIS**  HAS 3 V DISEASE** AWAITING CTS CONSULT           Subjective:  Patient just returned from cath lab  No complaints of chest pain/sob  Patient to have CTS on Tuesday          Medications:  Reviewed    Infusion Medications    heparin (PORCINE) Infusion      sodium chloride 20 mL/hr at 12/29/22 2132    sodium chloride       Scheduled Medications    [START ON 12/31/2022] amLODIPine  5 mg Oral Daily    lisinopril  5 mg Oral Daily    furosemide  20 mg Oral Daily    spironolactone  25 mg Oral Daily    isosorbide mononitrate  60 mg Oral Daily    lactulose  20 g Oral BID    pantoprazole  40 mg Oral QAM AC    metoprolol succinate  50 mg Oral BID    zolpidem  10 mg Oral Nightly    sodium chloride flush  5-40 mL IntraVENous 2 times per day    aspirin  81 mg Oral Daily    simvastatin  40 mg Oral Nightly     PRN Meds: nitroGLYCERIN, heparin (porcine), heparin (porcine), sodium chloride flush, sodium chloride, ondansetron **OR** ondansetron, acetaminophen **OR** acetaminophen, polyethylene glycol, nitroGLYCERIN, magnesium sulfate, potassium chloride **OR** potassium alternative oral replacement **OR** potassium chloride, ALPRAZolam      Intake/Output Summary (Last 24 hours) at 12/30/2022 1551  Last data filed at 12/30/2022 1351  Gross per 24 hour   Intake 953.97 ml   Output 400 ml   Net 553.97 ml       Exam:    /68   Pulse 66   Temp 97 °F (36.1 °C)   Resp 18   Ht 5' 4\" (1.626 m)   Wt 147 lb (66.7 kg)   SpO2 95%   BMI 25.23 kg/m²   Gen:  WELL DEVELOPED  HEENT: NC/AT, moist mucous membranes, no oropharyngeal erythema or exudate  Neck: supple, trachea midline, no anterior cervical or SC LAD  Heart:  Normal s1/s2, RRR,  Lungs:  CTA  bilaterally,  Abd: bowel sounds present, soft, nontender, nondistended, no masses  Extrem:  No clubbing, cyanosis,   NO  edema  Skin: no rashes or lesions  Psych: A & O x3  Neuro: grossly intact, moves all four extremities. Labs:   Recent Labs     12/28/22  0614 12/29/22  0515   WBC 6.0 6.4   HGB 9.9* 10.6*   HCT 30.5* 32.2*    301     Recent Labs     12/28/22  0614 12/29/22  0515    143   K 4.0 4.1    107   CO2 26 23   BUN 12 11   CREATININE 0.7 0.7   CALCIUM 10.0 10.6*     No results for input(s): AST, ALT, BILIDIR, BILITOT, ALKPHOS in the last 72 hours. No results for input(s): INR in the last 72 hours. No results for input(s): Randene Holes in the last 72 hours. No results for input(s): AST, ALT, ALB, BILIDIR, BILITOT, ALKPHOS in the last 72 hours. No results for input(s): LACTA in the last 72 hours. No results found for: Emeli Ahsan  No results found for: AMMONIA    Assessment:  SEVERE 3 VESSEL DISEASE  CONSTIPATION   ANEMIA( iron def)  Plan:   CTS CONSULT - CABG Tuesday  HEPARIN   ZOCOR   [facility]   LACTULOSE  DVT Prophylaxis:  HEPARIN  Diet: ADULT DIET; Regular;  No Caffeine  Diet NPO Exceptions are: Sips of Water with Meds  Code Status: Full Code     PT/OT Eval Status:  ORDERED     Dispo -  HOME  SEVERE 3 VESSEL DISEASE    Plan:  Electronically signed by LUCILLE Barth - CNP on 12/30/2022 at 3:51 PM Jose Vicente

## 2022-12-30 NOTE — PROCEDURES
[address]                  [address] [place],  [facility]                            CARDIAC CATHETERIZATION    PATIENT NAME: Arnol Ramos                     :        1945  MED REC NO:   [id number]                            ROOM:       [facility]  ACCOUNT NO:   [de-identified]                           ADMIT DATE: 2022  PROVIDER:     Sixto Huffman MD    DATE OF PROCEDURE:  2022    PROCEDURE:  Right heart catheterization, pulmonary artery oxygen  saturation, thermodilution cardiac output. The procedure done through right femoral venous approach using  ultrasound guidance. The patient received intravenous Versed and intravenous fentanyl for  sedation. INDICATION:  Pulmonary hypertension. Procedure requested by CT Surgery prior to open-heart/coronary artery  bypass graft surgery. PRESSURES:  RA 17/16 (14). RV 46/13, 10.  PA 53/24 (36). PCW 28/47 (34). OXYGEN SATURATIONS:  Femoral artery (pulse oximetry) 93%. Pulmonary artery 61.1%. THERMODILUTION CARDIAC OUTPUT:  4.02 liters per minute; cardiac index  2.3 liters per minute per meter square. CARDIAC OUTPUT (Rylie):  3.59 liters per minute; cardiac index 2.1 liters  per minute per meter square. The right femoral venous sheath was removed at the end of the procedure,  and digital pressure was applied with achievement of adequate  hemostasis. The patient tolerated the procedure well and left the cardiac  catheterization laboratory in stable condition. ESTIMATED BLOOD LOSS:  10 mL. CONCLUSIONS:  1.  Moderate pulmonary hypertension. 2.  Elevated pulmonary capillary wedge pressure. 3.  Reduced cardiac output/cardiac index.         Vineet Avendaño MD    D: 2022 10:27:14       T: 2022 10:29:49     [email or web address]  Job#: [id number]     Doc#: [id number]    CC:

## 2022-12-31 LAB
ANION GAP SERPL CALCULATED.3IONS-SCNC: 8 MMOL/L (ref 7–16)
APTT: 64.3 SEC (ref 24.5–35.1)
APTT: 72.1 SEC (ref 24.5–35.1)
APTT: 85.1 SEC (ref 24.5–35.1)
BUN BLDV-MCNC: 12 MG/DL (ref 6–23)
CALCIUM SERPL-MCNC: 10 MG/DL (ref 8.6–10.2)
CHLORIDE BLD-SCNC: 103 MMOL/L (ref 98–107)
CO2: 26 MMOL/L (ref 22–29)
CREAT SERPL-MCNC: 0.8 MG/DL (ref 0.5–1)
GFR SERPL CREATININE-BSD FRML MDRD: >60 ML/MIN/1.73
GLUCOSE BLD-MCNC: 109 MG/DL (ref 74–99)
POTASSIUM SERPL-SCNC: 3.8 MMOL/L (ref 3.5–5)
SODIUM BLD-SCNC: 137 MMOL/L (ref 132–146)
URINE CULTURE, ROUTINE: NORMAL

## 2022-12-31 PROCEDURE — 36415 COLL VENOUS BLD VENIPUNCTURE: CPT

## 2022-12-31 PROCEDURE — 2140000000 HC CCU INTERMEDIATE R&B

## 2022-12-31 PROCEDURE — 6370000000 HC RX 637 (ALT 250 FOR IP): Performed by: INTERNAL MEDICINE

## 2022-12-31 PROCEDURE — 80048 BASIC METABOLIC PNL TOTAL CA: CPT

## 2022-12-31 PROCEDURE — 99232 SBSQ HOSP IP/OBS MODERATE 35: CPT | Performed by: INTERNAL MEDICINE

## 2022-12-31 PROCEDURE — 99232 SBSQ HOSP IP/OBS MODERATE 35: CPT | Performed by: STUDENT IN AN ORGANIZED HEALTH CARE EDUCATION/TRAINING PROGRAM

## 2022-12-31 PROCEDURE — 2580000003 HC RX 258: Performed by: INTERNAL MEDICINE

## 2022-12-31 PROCEDURE — 6370000000 HC RX 637 (ALT 250 FOR IP): Performed by: NURSE PRACTITIONER

## 2022-12-31 PROCEDURE — 85730 THROMBOPLASTIN TIME PARTIAL: CPT

## 2022-12-31 PROCEDURE — 6360000002 HC RX W HCPCS: Performed by: INTERNAL MEDICINE

## 2022-12-31 RX ORDER — LISINOPRIL 10 MG/1
10 TABLET ORAL DAILY
Status: DISCONTINUED | OUTPATIENT
Start: 2023-01-01 | End: 2023-01-02

## 2022-12-31 RX ADMIN — LACTULOSE 20 G: 20 SOLUTION ORAL at 08:31

## 2022-12-31 RX ADMIN — ACETAMINOPHEN 650 MG: 325 TABLET ORAL at 08:34

## 2022-12-31 RX ADMIN — MUPIROCIN: 20 OINTMENT TOPICAL at 08:31

## 2022-12-31 RX ADMIN — ACETAMINOPHEN 650 MG: 325 TABLET ORAL at 16:20

## 2022-12-31 RX ADMIN — LISINOPRIL 5 MG: 5 TABLET ORAL at 08:32

## 2022-12-31 RX ADMIN — MUPIROCIN: 20 OINTMENT TOPICAL at 20:39

## 2022-12-31 RX ADMIN — METOPROLOL SUCCINATE 50 MG: 50 TABLET, EXTENDED RELEASE ORAL at 20:39

## 2022-12-31 RX ADMIN — MUPIROCIN: 20 OINTMENT TOPICAL at 00:01

## 2022-12-31 RX ADMIN — SPIRONOLACTONE 25 MG: 25 TABLET ORAL at 08:29

## 2022-12-31 RX ADMIN — METOPROLOL SUCCINATE 50 MG: 50 TABLET, EXTENDED RELEASE ORAL at 08:29

## 2022-12-31 RX ADMIN — ZOLPIDEM TARTRATE 10 MG: 5 TABLET ORAL at 20:39

## 2022-12-31 RX ADMIN — SODIUM CHLORIDE: 9 INJECTION, SOLUTION INTRAVENOUS at 22:27

## 2022-12-31 RX ADMIN — Medication 40 MG: at 20:38

## 2022-12-31 RX ADMIN — ASPIRIN 81 MG CHEWABLE TABLET 81 MG: 81 TABLET CHEWABLE at 08:29

## 2022-12-31 RX ADMIN — PANTOPRAZOLE SODIUM 40 MG: 40 TABLET, DELAYED RELEASE ORAL at 07:06

## 2022-12-31 RX ADMIN — HEPARIN SODIUM 12 UNITS/KG/HR: 10000 INJECTION, SOLUTION INTRAVENOUS at 22:27

## 2022-12-31 RX ADMIN — ISOSORBIDE MONONITRATE 60 MG: 30 TABLET, EXTENDED RELEASE ORAL at 08:29

## 2022-12-31 RX ADMIN — FUROSEMIDE 20 MG: 40 TABLET ORAL at 08:29

## 2022-12-31 ASSESSMENT — PAIN SCALES - GENERAL
PAINLEVEL_OUTOF10: 5
PAINLEVEL_OUTOF10: 0
PAINLEVEL_OUTOF10: 9

## 2022-12-31 ASSESSMENT — PAIN DESCRIPTION - ORIENTATION
ORIENTATION: ANTERIOR
ORIENTATION: ANTERIOR

## 2022-12-31 ASSESSMENT — PAIN DESCRIPTION - LOCATION
LOCATION: HEAD
LOCATION: HEAD

## 2022-12-31 ASSESSMENT — PAIN DESCRIPTION - ONSET
ONSET: GRADUAL
ONSET: GRADUAL

## 2022-12-31 ASSESSMENT — PAIN DESCRIPTION - DESCRIPTORS
DESCRIPTORS: ACHING;DISCOMFORT;SORE
DESCRIPTORS: ACHING;SORE

## 2022-12-31 ASSESSMENT — PAIN DESCRIPTION - DIRECTION
RADIATING_TOWARDS: NONRADIATING
RADIATING_TOWARDS: NONRADIATING

## 2022-12-31 ASSESSMENT — PAIN DESCRIPTION - PAIN TYPE
TYPE: ACUTE PAIN
TYPE: ACUTE PAIN

## 2022-12-31 ASSESSMENT — PAIN - FUNCTIONAL ASSESSMENT
PAIN_FUNCTIONAL_ASSESSMENT: ACTIVITIES ARE NOT PREVENTED
PAIN_FUNCTIONAL_ASSESSMENT: ACTIVITIES ARE NOT PREVENTED

## 2022-12-31 ASSESSMENT — PAIN DESCRIPTION - FREQUENCY
FREQUENCY: INTERMITTENT
FREQUENCY: INTERMITTENT

## 2022-12-31 NOTE — PROGRESS NOTES
Inpatient Cardiology Progress Note     PATIENT IS BEING FOLLOWED FOR: NSTEMI    Clemencia Alvarez is a 78 y.o. female known to Dr. Flip Vasquez     Patient is 78years old female with history of hypertension and hyperlipidemia who presented to the hospital 12/24/22 with chest pain and shortness of breath, patient was found to have elevated troponin, cardiology was consulted. As per patient patient has been having dyspnea on exertion and exertional chest pain since June, better with rest, symptoms have gotten worse over the last few weeks. 12/23/22,  chest pain was significantly worse, that prompted her to seek medical attention. At the time of the consultation she was chest pain-free: did complain of short of breath described as having to take a deep breath to expand her lungs. Denied any pedal edema, + easy fatigability, no PND, no orthopnea, denied lightheadedness, palpitations, presyncope or syncope. N.B. Patient was scheduled for LHC by her primary cardiologist Dr. Flip Vasquez 1/3/23    EGD 12/27/22: no GI  bleeding. Mild gastritis    Cath 12/28/22: see below    SUBJECTIVE: Occasional chest tightness. Denies SOB. No overnight issues  OBJECTIVE: No apparent distress     ROS:  Consist: Denies fevers, chills or night sweats  Heart: Denies chest pain, palpitations, lightheadedness, dizziness or syncope  Lungs: Denies SOB, cough, wheezing, orthopnea or PND  GI: Denies abdominal pain, vomiting or diarrhea    PHYSICAL EXAM:   BP (!) 140/80   Pulse 66   Temp 97.3 °F (36.3 °C) (Temporal)   Resp 18   Ht 5' 4\" (1.626 m)   Wt 149 lb 11.1 oz (67.9 kg)   SpO2 97%   BMI 25.69 kg/m²    B/P Range last 24 hours: Systolic (25IBB), OTILIO:642 , Min:95 , FIY:712    Diastolic (46WLD), TRH:14, Min:57, Max:88    CONST: Well developed, well nourished female who appears of stated age. Awake, alert and cooperative.  No apparent distress  HEENT:   Head- Normocephalic, atraumatic   Eyes- Conjunctivae pink, anicteric  Throat- Oral mucosa pink and moist  Neck-  No stridor, trachea midline, no jugular venous distention. No carotid bruit  CHEST: Chest symmetrical and non-tender to palpation. No accessory muscle use or intercostal retractions  RESPIRATORY:  Lung sounds - clear throughout fields   CARDIOVASCULAR:     Heart Inspection- shows no noted pulsations  Heart Palpation- no heaves or thrills; PMI is non-displaced   Heart Ausculation- Regular rate and rhythm, no murmur. No s3, s4 or rub   PV: No lower extremity edema. No varicosities. Pedal pulses palpable, no clubbing or cyanosis. Right groin site of venous access ( RHC ) soft without hematoma  ABDOMEN: Soft, non-tender to light palpation. Bowel sounds present. No palpable masses no organomegaly; no abdominal bruit  MS: Good muscle strength and tone. No atrophy or abnormal movements. : Deferred  SKIN: Warm and dry no statis dermatitis or ulcers   NEURO / PSYCH: Oriented to person, place and time. Speech clear and appropriate. Follows all commands.  Pleasant affect       Intake/Output Summary (Last 24 hours) at 12/31/2022 1049  Last data filed at 12/31/2022 0929  Gross per 24 hour   Intake 390 ml   Output 750 ml   Net -360 ml       Weight:   Wt Readings from Last 3 Encounters:   12/31/22 149 lb 11.1 oz (67.9 kg)   12/14/22 155 lb (70.3 kg)   06/14/22 162 lb (73.5 kg)     Current Inpatient Medications:   [START ON 1/1/2023] lisinopril  10 mg Oral Daily    amLODIPine  5 mg Oral Daily    furosemide  20 mg Oral Daily    spironolactone  25 mg Oral Daily    mupirocin   Nasal BID    isosorbide mononitrate  60 mg Oral Daily    lactulose  20 g Oral BID    pantoprazole  40 mg Oral QAM AC    metoprolol succinate  50 mg Oral BID    zolpidem  10 mg Oral Nightly    sodium chloride flush  5-40 mL IntraVENous 2 times per day    aspirin  81 mg Oral Daily    simvastatin  40 mg Oral Nightly       IV Infusions (if any):   heparin (PORCINE) Infusion 14 Units/kg/hr (12/31/22 4256)    sodium chloride 20 mL/hr at 12/31/22 0840    sodium chloride         DIAGNOSTIC/ LABORATORY DATA:  Labs:   CBC:   Recent Labs     12/29/22  0515   WBC 6.4   HGB 10.6*   HCT 32.2*        BMP:   Recent Labs     12/29/22  0515 12/31/22  0552    137   K 4.1 3.8   CO2 23 26   BUN 11 12   CREATININE 0.7 0.8   LABGLOM >60 >60   CALCIUM 10.6* 10.0         APTT:  Recent Labs     12/30/22  2201 12/31/22  0552   APTT 45.6* 72.1*     CARDIAC ENZYMES:  No results for input(s): CKTOTAL, CKMB, CKMBINDEX, TROPHS in the last 72 hours. FASTING LIPID PANEL:  Lab Results   Component Value Date/Time    CHOL 147 12/25/2022 05:13 AM    HDL 63 12/25/2022 05:13 AM    LDLCALC 61 12/25/2022 05:13 AM    TRIG 113 12/25/2022 05:13 AM           CXR 1/24/22:   Cardiomegaly with  patchy perihilar and bibasilar infiltrates and effusions likely CHF/edema and or pneumonia. Soft tissue prominence in the right hilum. Consider surveillance preferably by CT scan. 12 lead EKG 12/24/22:  Sinus tachycardia  Possible Left atrial enlargement  Cannot rule out Inferior infarct , age undetermined  ST & T wave abnormality, consider anterolateral ischemia    Echo 12/26/22 ( Dr. Imer Edgar ):   Summary   The left ventricle is mildly dilated. There is apical wall akinesis, distal septal dyskinesis and distal   inferior wall hypokinesis with thinning corresponding thinning of the   myocardium. Ejection fraction is visually estimated at 40%. There is doppler evidence of stage I diastolic dysfunction. Normal right ventricular size and function. Normal size atria. Mild mitral regurgitation. Mild tricuspid regurgitation. Moderate pulmonary hypertension. Cath 12/28/22 ( Dr. Imer Edgar ):  PRESSURES:  Aorta 114/55 with a mean of 80. Left ventricular systolic pressure 417, left ventricular end-diastolic  pressure 26. There was no significant gradient across the aortic valve.      CORONARY ANGIOGRAPHY:  Left main:  The left main artery has an eccentric 90% distal vessel  stenosis just before the origins of the left anterior descending artery  and the left circumflex. LAD:  The left anterior descending artery is calcified in its proximal  segment. The proximal LAD is diffusely diseased with up to 95% luminal  narrowing. There is also 80 to 90% disease of the LAD after the  diagonal branch in the middle segment. The distal LAD after this lesion  over a long segment did not appear to have any significant angiographic  disease. The diagonal branch itself did not appear to have any  significant angiographic disease. LCX:  The left circumflex is a large but nondominant vessel. It gives  rise to two small first and second marginal branches. There is 50%  narrowing in the mid left circumflex before a large marginal branch and  the terminal lateral branch. RCA:  The right coronary artery is a dominant vessel providing the  posterior descending artery branch and is occluded at the mid vessel  level with the distal vessel filling from right-to-right collaterals. LEFT VENTRICULOGRAPHY:  The left ventricle is dilated. There was  moderate generalized hypokinesis more so involving the left ventricular  apex with an estimated ejection fraction of 35 to 40%. CONCLUSIONS:  1. Severe multivessel coronary artery disease as described above. 2.  Dilated left ventricle with an estimated ejection fraction of 35 to  40%. 3.  Elevated left ventricular end-diastolic pressure. BRISEIDA 12/30/22 ( Dr. Lomas Nurse ):  Overall ejection fraction moderately decreased. Normal right ventricular size and function. Moderately dilated left atrium. Moderate mitral regurgitation which appears function secondary to the   inferolateral wall tethering the papillary. Mild tricuspid regurgitation. RVSP is 35 mmHg. [address] 12/30/33 ( Dr. Knute Epley ):  PRESSURES:  RA 17/16 (14). RV 46/13, 10.  PA 53/24 (36). PCW 28/47 (34). OXYGEN SATURATIONS:  Femoral artery (pulse oximetry) 93%.   Pulmonary artery 61.1%.     THERMODILUTION CARDIAC OUTPUT:  4.02 liters per minute; cardiac index  2.3 liters per minute per meter square. CARDIAC OUTPUT (Rylie):  3.59 liters per minute; cardiac index 2.1 liters  per minute per meter square. CONCLUSIONS:  1.  Moderate pulmonary hypertension. 2.  Elevated pulmonary capillary wedge pressure. 3.  Reduced cardiac output/cardiac index. Telemetry: SR in the 70's      ASSESSMENT:   1.  Non-ST elevation MI, remains CP free  2. Ischemic cardiomyopathy with moderate LV systolic dysfunction ( EF ~ 40% on Echo 12/26/22 )  3. Acute HFrEF  4. Mild MR, Mild TR and moderate pulmonary HTN on Echo 12/26/22.   5.  RHC also showing moderate PHTN likely the result of left sided failure ( prior to initiating ACE I therapy , oral diuretics and spironolactone ). 5.  Hypertension: Controlled   6. Hyperlipidemia: Simvastatin    7. Anemia. H&H stable. EGD 12/27/22: small hiatal hernia with mild antral gastritis --> per general surgery: \" .EGD ok, no upper GIB, highly unlikely to be LGIB. Jay Agarwal for cardiac catheterization and DATP if indicated \".           PLAN:  Increase Lisinopril  Rest of cardiac medications same  CABG tentatively Tuesday 1/3/2023  Will continue to follow    Electronically signed by Melinda Wilder MD on 12/31/2022 at 10:49 AM

## 2022-12-31 NOTE — PROGRESS NOTES
April Melisa notified of positive detection of MRSA of nares. Orders given. Patient transferred to [address]. Put into isolation.

## 2022-12-31 NOTE — PATIENT CARE CONFERENCE
P Quality Flow/Interdisciplinary Rounds Progress Note        Quality Flow Rounds held on December 31, 2022    Disciplines Attending:  Bedside Nurse, , , and Nursing Unit Leadership    Eliseo Nyhan was admitted on 12/24/2022  2:40 PM    Anticipated Discharge Date:       Disposition:    Erlin Score:  Erlin Scale Score: 20    Readmission Risk              Risk of Unplanned Readmission:  11           Discussed patient goal for the day, patient clinical progression, and barriers to discharge.   The following Goal(s) of the Day/Commitment(s) have been identified:  Labs - Report Results      Anish Joe RN  December 31, 2022

## 2022-12-31 NOTE — PROGRESS NOTES
CC: MVCAD     Brief HPI:  Patient seen. Awake, alert. No complaints. Past Medical History:   Diagnosis Date    ARJUN (cerebral atherosclerosis)     History of blood transfusion     Hyperlipidemia     Hypertension      Past Surgical History:   Procedure Laterality Date    CARDIAC CATHETERIZATION  12/30/2022    Dr. Carli Hercules ENDOSCOPY N/A 12/27/2022    EGD ESOPHAGOGASTRODUODENOSCOPY performed by Tressa Lacey MD at [address] History     Socioeconomic History    Marital status:      Spouse name: Not on file    Number of children: Not on file    Years of education: Not on file    Highest education level: Not on file   Occupational History    Not on file   Tobacco Use    Smoking status: Never    Smokeless tobacco: Never   Vaping Use    Vaping Use: Never used   Substance and Sexual Activity    Alcohol use: No    Drug use: No    Sexual activity: Not on file   Other Topics Concern    Not on file   Social History Narrative    Not on file     Social Determinants of Health     Financial Resource Strain: Not on file   Food Insecurity: Not on file   Transportation Needs: Not on file   Physical Activity: Not on file   Stress: Not on file   Social Connections: Not on file   Intimate Partner Violence: Not on file   Housing Stability: Not on file     History reviewed. No pertinent family history.     Vitals:    12/31/22 0000 12/31/22 0410 12/31/22 0538 12/31/22 0758   BP: (!) 112/57 122/80  (!) 140/80   Pulse: 65 68  80   Resp: 18 18  18   Temp: 98.8 °F (37.1 °C) 98.9 °F (37.2 °C)  97.3 °F (36.3 °C)   TempSrc: Temporal Temporal     SpO2: 94% 97%  97%   Weight:   149 lb 11.1 oz (67.9 kg)    Height:               Intake/Output Summary (Last 24 hours) at 12/31/2022 0852  Last data filed at 12/30/2022 1842  Gross per 24 hour   Intake 440 ml   Output 400 ml   Net 40 ml         Recent Labs     12/29/22  0515   WBC 6.4   HGB 10.6*   HCT 32.2*    Recent Labs     12/29/22  0515 12/31/22  0552   BUN 11 12   CREATININE 0.7 0.8         Creatinine   Date/Time Value Ref Range Status   12/31/2022 05:52 AM 0.8 0.5 - 1.0 mg/dL Final   12/29/2022 05:15 AM 0.7 0.5 - 1.0 mg/dL Final   12/28/2022 06:14 AM 0.7 0.5 - 1.0 mg/dL Final         ROS:   Negative for CP, palpitations, SOB at rest, dizziness/lightheadedness. Physical Exam   Constitutional: Oriented to person, place, and time. Appears well-developed. No distress. Cardiovascular: Normal rate, regular rhythm and normal heart sounds   Pulmonary/Chest: Effort normal. No respiratory distress. Abdominal: Soft. Bowel sounds are normal.   Musculoskeletal: Normal range of motion. Neurological: alert and oriented to person, place, and time. Skin: Skin is warm and dry. Psychiatric: normal mood and affect.        ASSESSMENT:  Patient Active Problem List   Diagnosis    Bilateral carotid artery stenosis    NSTEMI (non-ST elevated myocardial infarction) ([facility])    Acute heart failure ([facility])    Systolic murmur    EKG, abnormal    Abnormal nuclear stress test    Primary hypertension    Hyperlipidemia    Carotid artery disease ([facility] [address]    CAD (coronary artery disease)         PLAN:  PLAN:  Tentative plan for OR Tue afternoon  For RHC and BRISEIDA completed   On Imdur and Heparin         Pre-operative testing review:   --carotids right with no significant stenosis, left with 50-69% stenosis   --shemar with good flow bilaterally  --ct chest reviewed  --BRISEIDA with moderate MR  --PFTs  FEV1 54% predicted and DLCO 64% predicted   --hgA1c 5.4    Recent Labs     12/29/22  0515   HGB 10.6*   HCT 32.2*        Recent Labs     12/31/22  0552   BUN 12   CREATININE 0.8     Lab Results   Component Value Date/Time    LABURIN Growth not present 12/29/2022 07:00 AM         [id number] Score for Atrial Fibrillation Stroke Risk   Risk   Factors  Component Value   C CHF  1   H HTN  1   A2 Age >= 75  2   D DM  0   S2 Prior Stroke/TIA  0   V Vascular Disease  1   A Age 74-69  0   Sc Sex  1    [id number]  Score  6                Preoperative NYHA Class: III     Note: 25 minutes was spent providing face-to-face patient care, including:  and coordinating care, reviewing the chart, labs, and diagnostics, as well as medical decision making. Greater than 50% of this time was spent instructing and counseling the patient face to face regarding findings and recommendations. Patient seen and examined. No complaints. Denies recurrent chest pain. Is anxious, which is her baseline.   Tentative plans for OR Tuesday afternoon with Dr. Marsha Gorman  TTE and RHC appreciated    [address]  Cardiothoracic Surgery

## 2022-12-31 NOTE — PLAN OF CARE
Problem: Discharge Planning  Goal: Discharge to home or other facility with appropriate resources  12/31/2022 1801 by Tiana Fajardo RN  Outcome: Progressing  Flowsheets  Taken 12/31/2022 1602  Discharge to home or other facility with appropriate resources:   Identify barriers to discharge with patient and caregiver   Arrange for needed discharge resources and transportation as appropriate  Taken 12/31/2022 1217  Discharge to home or other facility with appropriate resources:   Identify barriers to discharge with patient and caregiver   Arrange for needed discharge resources and transportation as appropriate  12/31/2022 0957 by Tiana Fajardo RN  Outcome: Progressing  Flowsheets (Taken 12/31/2022 0830)  Discharge to home or other facility with appropriate resources:   Identify barriers to discharge with patient and caregiver   Arrange for needed discharge resources and transportation as appropriate     Problem: Pain  Goal: Verbalizes/displays adequate comfort level or baseline comfort level  12/31/2022 1801 by Tiana Fajardo RN  Outcome: Progressing  Flowsheets  Taken 12/31/2022 1620  Verbalizes/displays adequate comfort level or baseline comfort level:   Assess pain using appropriate pain scale   Encourage patient to monitor pain and request assistance  Taken 12/31/2022 1526  Verbalizes/displays adequate comfort level or baseline comfort level:   Encourage patient to monitor pain and request assistance   Assess pain using appropriate pain scale  Taken 12/31/2022 1217  Verbalizes/displays adequate comfort level or baseline comfort level:   Assess pain using appropriate pain scale   Encourage patient to monitor pain and request assistance  12/31/2022 0957 by Tiana Fajardo RN  Outcome: Progressing  Flowsheets (Taken 12/31/2022 0834)  Verbalizes/displays adequate comfort level or baseline comfort level:   Encourage patient to monitor pain and request assistance   Assess pain using appropriate pain scale     Problem: ABCDS Injury Assessment  Goal: Absence of physical injury  12/31/2022 1801 by Elvia Brower RN  Outcome: Progressing  12/31/2022 0957 by Elvia Brower RN  Outcome: Progressing  Flowsheets (Taken 12/31/2022 0830)  Absence of Physical Injury: Implement safety measures based on patient assessment     Problem: Safety - Adult  Goal: Free from fall injury  12/31/2022 1801 by Elvia Brower RN  Outcome: Progressing  12/31/2022 0957 by Elvia Brower RN  Outcome: Progressing  Flowsheets (Taken 12/31/2022 0830)  Free From Fall Injury: Instruct family/caregiver on patient safety     Problem: Nutrition Deficit:  Goal: Optimize nutritional status  12/31/2022 1801 by Elvia Brower RN  Outcome: Progressing  12/31/2022 0957 by Elvia Brower RN  Outcome: Progressing

## 2022-12-31 NOTE — PROGRESS NOTES
Hospitalist Progress Note      PCP: Emily Herzog MD    Date of Admission: 12/24/2022        Hospital Course:  HAD BEEN HAVING CHEST PAIN, AND WAS FOR A HEART CATH AFTER THE 1ST OF THE YEAR.   IT BECAME WORSE AND SHE CAME TO THE ER, FOUND TO HAVE A NSTEMI **    FOR EGD TODAY, DUE TO DECREASING HGB  **IT SHOWED GASTRITIS**  HAS 3 V DISEASE**  CTA SURGERY ON TUESDAY           Subjective:  CHEST PAIN           Medications:  Reviewed    Infusion Medications    heparin (PORCINE) Infusion 14 Units/kg/hr (12/31/22 0841)    sodium chloride 20 mL/hr at 12/31/22 0840    sodium chloride       Scheduled Medications    amLODIPine  5 mg Oral Daily    lisinopril  5 mg Oral Daily    furosemide  20 mg Oral Daily    spironolactone  25 mg Oral Daily    mupirocin   Nasal BID    isosorbide mononitrate  60 mg Oral Daily    lactulose  20 g Oral BID    pantoprazole  40 mg Oral QAM AC    metoprolol succinate  50 mg Oral BID    zolpidem  10 mg Oral Nightly    sodium chloride flush  5-40 mL IntraVENous 2 times per day    aspirin  81 mg Oral Daily    simvastatin  40 mg Oral Nightly     PRN Meds: nitroGLYCERIN, heparin (porcine), heparin (porcine), sodium chloride flush, sodium chloride, ondansetron **OR** ondansetron, acetaminophen **OR** acetaminophen, polyethylene glycol, nitroGLYCERIN, magnesium sulfate, potassium chloride **OR** potassium alternative oral replacement **OR** potassium chloride, ALPRAZolam      Intake/Output Summary (Last 24 hours) at 12/31/2022 0907  Last data filed at 12/30/2022 1842  Gross per 24 hour   Intake 240 ml   Output 400 ml   Net -160 ml       Exam:    BP (!) 140/80   Pulse 80   Temp 97.3 °F (36.3 °C)   Resp 18   Ht 5' 4\" (1.626 m)   Wt 149 lb 11.1 oz (67.9 kg)   SpO2 97%   BMI 25.69 kg/m²       Gen:  WELL DEVELOPED  HEENT: NC/AT, moist mucous membranes, no oropharyngeal erythema or exudate  Neck: supple, trachea midline, no anterior cervical or SC LAD  Heart:  Normal s1/s2, RRR,  Lungs:  CTA bilaterally,  Abd: bowel sounds present, soft, nontender, nondistended, no masses  Extrem:  No clubbing, cyanosis,   NO  edema  Skin: no rashes or lesions  Psych: A & O x3  Neuro: grossly intact, moves all four extremities. Labs:   Recent Labs     12/29/22  0515   WBC 6.4   HGB 10.6*   HCT 32.2*        Recent Labs     12/29/22  0515 12/31/22  0552    137   K 4.1 3.8    103   CO2 23 26   BUN 11 12   CREATININE 0.7 0.8   CALCIUM 10.6* 10.0     No results for input(s): AST, ALT, BILIDIR, BILITOT, ALKPHOS in the last 72 hours. No results for input(s): INR in the last 72 hours. No results for input(s): Tellis Loss in the last 72 hours. No results for input(s): AST, ALT, ALB, BILIDIR, BILITOT, ALKPHOS in the last 72 hours. No results for input(s): LACTA in the last 72 hours. No results found for: Tank Olivier  No results found for: AMMONIA    Assessment:    Active Hospital Problems    Diagnosis Date Noted    NSTEMI (non-ST elevated myocardial infarction) ([facility] [address] [I21.4] 12/24/2022     Priority: Medium    Acute heart failure ([facility] [address] [I50.9] 12/24/2022     Priority: Medium    Systolic murmur [J46.6] 40/02/7850     Priority: Medium    EKG, abnormal [R94.31] 12/24/2022     Priority: Medium    Abnormal nuclear stress test [R94.39] 12/24/2022     Priority: Medium    Primary hypertension [I10] 12/24/2022     Priority: Medium    Hyperlipidemia [E78.5] 12/24/2022     Priority: Medium    Carotid artery disease ([facility] [address] [I77.9] 12/24/2022     Priority: Medium    CAD (coronary artery disease) [I25.10] 12/24/2022   CONSTIPATION   ANEMIA( iron def)  Plan:    SURGERY ON Tuesday  HEPARIN   ZOCOR   NORVASC   LACTULOSE  DVT Prophylaxis:  HEPARIN  Diet: ADULT DIET; Regular;  No Caffeine  Diet NPO Exceptions are: Sips of Water with Meds  Code Status: Full Code     PT/OT Eval Status:  ORDERED     Dispo -  HOME        Electronically signed by Jordan Cota DO on 12/31/2022 at 9:07 AM [facility]

## 2023-01-01 ENCOUNTER — ANESTHESIA EVENT (OUTPATIENT)
Dept: OPERATING ROOM | Age: 78
End: 2023-01-01
Payer: MEDICARE

## 2023-01-01 LAB
APTT: 47.1 SEC (ref 24.5–35.1)
APTT: 67.2 SEC (ref 24.5–35.1)
APTT: 82.4 SEC (ref 24.5–35.1)

## 2023-01-01 PROCEDURE — 6370000000 HC RX 637 (ALT 250 FOR IP): Performed by: INTERNAL MEDICINE

## 2023-01-01 PROCEDURE — 6370000000 HC RX 637 (ALT 250 FOR IP): Performed by: NURSE PRACTITIONER

## 2023-01-01 PROCEDURE — 85730 THROMBOPLASTIN TIME PARTIAL: CPT

## 2023-01-01 PROCEDURE — 99232 SBSQ HOSP IP/OBS MODERATE 35: CPT | Performed by: STUDENT IN AN ORGANIZED HEALTH CARE EDUCATION/TRAINING PROGRAM

## 2023-01-01 PROCEDURE — 6360000002 HC RX W HCPCS: Performed by: INTERNAL MEDICINE

## 2023-01-01 PROCEDURE — 2140000000 HC CCU INTERMEDIATE R&B

## 2023-01-01 PROCEDURE — 99231 SBSQ HOSP IP/OBS SF/LOW 25: CPT | Performed by: INTERNAL MEDICINE

## 2023-01-01 PROCEDURE — 36415 COLL VENOUS BLD VENIPUNCTURE: CPT

## 2023-01-01 PROCEDURE — 2580000003 HC RX 258: Performed by: INTERNAL MEDICINE

## 2023-01-01 RX ORDER — GUAIFENESIN 400 MG/1
400 TABLET ORAL 3 TIMES DAILY
Status: DISCONTINUED | OUTPATIENT
Start: 2023-01-01 | End: 2023-01-03

## 2023-01-01 RX ADMIN — METOPROLOL SUCCINATE 50 MG: 50 TABLET, EXTENDED RELEASE ORAL at 20:46

## 2023-01-01 RX ADMIN — Medication 40 MG: at 20:47

## 2023-01-01 RX ADMIN — PANTOPRAZOLE SODIUM 40 MG: 40 TABLET, DELAYED RELEASE ORAL at 06:43

## 2023-01-01 RX ADMIN — HEPARIN SODIUM 2000 UNITS: 1000 INJECTION INTRAVENOUS; SUBCUTANEOUS at 19:40

## 2023-01-01 RX ADMIN — ALPRAZOLAM 0.25 MG: 0.25 TABLET ORAL at 20:49

## 2023-01-01 RX ADMIN — GUAIFENESIN 400 MG: 400 TABLET ORAL at 21:44

## 2023-01-01 RX ADMIN — MUPIROCIN: 20 OINTMENT TOPICAL at 08:08

## 2023-01-01 RX ADMIN — LISINOPRIL 10 MG: 10 TABLET ORAL at 12:21

## 2023-01-01 RX ADMIN — ALPRAZOLAM 0.25 MG: 0.25 TABLET ORAL at 08:06

## 2023-01-01 RX ADMIN — METOPROLOL SUCCINATE 50 MG: 50 TABLET, EXTENDED RELEASE ORAL at 08:06

## 2023-01-01 RX ADMIN — SODIUM CHLORIDE, PRESERVATIVE FREE 10 ML: 5 INJECTION INTRAVENOUS at 08:10

## 2023-01-01 RX ADMIN — ASPIRIN 81 MG CHEWABLE TABLET 81 MG: 81 TABLET CHEWABLE at 08:06

## 2023-01-01 RX ADMIN — AMLODIPINE BESYLATE 5 MG: 5 TABLET ORAL at 08:06

## 2023-01-01 RX ADMIN — HEPARIN SODIUM 12 UNITS/KG/HR: 10000 INJECTION, SOLUTION INTRAVENOUS at 19:40

## 2023-01-01 RX ADMIN — LACTULOSE 20 G: 20 SOLUTION ORAL at 20:51

## 2023-01-01 RX ADMIN — SPIRONOLACTONE 25 MG: 25 TABLET ORAL at 08:06

## 2023-01-01 RX ADMIN — SODIUM CHLORIDE, PRESERVATIVE FREE 10 ML: 5 INJECTION INTRAVENOUS at 20:54

## 2023-01-01 RX ADMIN — FUROSEMIDE 20 MG: 40 TABLET ORAL at 08:06

## 2023-01-01 RX ADMIN — MUPIROCIN: 20 OINTMENT TOPICAL at 20:53

## 2023-01-01 RX ADMIN — ZOLPIDEM TARTRATE 10 MG: 5 TABLET ORAL at 20:50

## 2023-01-01 RX ADMIN — ISOSORBIDE MONONITRATE 60 MG: 30 TABLET, EXTENDED RELEASE ORAL at 12:20

## 2023-01-01 ASSESSMENT — PAIN SCALES - GENERAL
PAINLEVEL_OUTOF10: 0

## 2023-01-01 NOTE — PROGRESS NOTES
Inpatient Cardiology Progress Note     PATIENT IS BEING FOLLOWED FOR: NSTEMI    Claude Brown is a 78 y.o. female known to Dr. Mariella Ashford     Patient is 78years old female with history of hypertension and hyperlipidemia who presented to the hospital 12/24/22 with chest pain and shortness of breath, patient was found to have elevated troponin, cardiology was consulted. As per patient patient has been having dyspnea on exertion and exertional chest pain since June, better with rest, symptoms have gotten worse over the last few weeks. 12/23/22,  chest pain was significantly worse, that prompted her to seek medical attention. At the time of the consultation she was chest pain-free: did complain of short of breath described as having to take a deep breath to expand her lungs. Denied any pedal edema, + easy fatigability, no PND, no orthopnea, denied lightheadedness, palpitations, presyncope or syncope. N.B. Patient was scheduled for LHC by her primary cardiologist Dr. Mariella Ashford 1/3/23    EGD 12/27/22: no GI  bleeding. Mild gastritis    Cath 12/28/22: see below    SUBJECTIVE: Occasional chest tightness. Denies SOB. No overnight issues  OBJECTIVE: No apparent distress     ROS:  Consist: Denies fevers, chills or night sweats  Heart: Denies chest pain, palpitations, lightheadedness, dizziness or syncope  Lungs: Denies SOB, cough, wheezing, orthopnea or PND  GI: Denies abdominal pain, vomiting or diarrhea    PHYSICAL EXAM:   /70   Pulse 63   Temp 97.9 °F (36.6 °C) (Oral)   Resp 16   Ht 5' 4\" (1.626 m)   Wt 144 lb 6.4 oz (65.5 kg)   SpO2 96%   BMI 24.79 kg/m²    B/P Range last 24 hours: Systolic (62BCN), FDA:451 , Min:97 , PWF:538    Diastolic (28HWS), BXZ:13, Min:52, Max:73    CONST: Well developed, well nourished female who appears of stated age. Awake, alert and cooperative.  No apparent distress  HEENT:   Head- Normocephalic, atraumatic   Eyes- Conjunctivae pink, anicteric  Throat- Oral mucosa pink and moist  Neck-  No stridor, trachea midline, no jugular venous distention. No carotid bruit  CHEST: Chest symmetrical and non-tender to palpation. No accessory muscle use or intercostal retractions  RESPIRATORY:  Lung sounds - clear throughout fields   CARDIOVASCULAR:     Heart Inspection- shows no noted pulsations  Heart Palpation- no heaves or thrills; PMI is non-displaced   Heart Ausculation- Regular rate and rhythm, no murmur. No s3, s4 or rub   PV: No lower extremity edema. No varicosities. Pedal pulses palpable, no clubbing or cyanosis. Right groin site of venous access ( RHC ) soft without hematoma  ABDOMEN: Soft, non-tender to light palpation. Bowel sounds present. No palpable masses no organomegaly; no abdominal bruit  MS: Good muscle strength and tone. No atrophy or abnormal movements. : Deferred  SKIN: Warm and dry no statis dermatitis or ulcers   NEURO / PSYCH: Oriented to person, place and time. Speech clear and appropriate. Follows all commands.  Pleasant affect       Intake/Output Summary (Last 24 hours) at 1/1/2023 1201  Last data filed at 1/1/2023 0644  Gross per 24 hour   Intake 210 ml   Output 1150 ml   Net -940 ml       Weight:   Wt Readings from Last 3 Encounters:   01/01/23 144 lb 6.4 oz (65.5 kg)   12/14/22 155 lb (70.3 kg)   06/14/22 162 lb (73.5 kg)     Current Inpatient Medications:   lisinopril  10 mg Oral Daily    amLODIPine  5 mg Oral Daily    furosemide  20 mg Oral Daily    spironolactone  25 mg Oral Daily    mupirocin   Nasal BID    isosorbide mononitrate  60 mg Oral Daily    lactulose  20 g Oral BID    pantoprazole  40 mg Oral QAM AC    metoprolol succinate  50 mg Oral BID    zolpidem  10 mg Oral Nightly    sodium chloride flush  5-40 mL IntraVENous 2 times per day    aspirin  81 mg Oral Daily    simvastatin  40 mg Oral Nightly       IV Infusions (if any):   heparin (PORCINE) Infusion 10 Units/kg/hr (01/01/23 0640)    sodium chloride 20 mL/hr at 12/31/22 4032    sodium chloride DIAGNOSTIC/ LABORATORY DATA:  Labs:   CBC:   No results for input(s): WBC, HGB, HCT, PLT in the last 72 hours. BMP:   Recent Labs     12/31/22  0552      K 3.8   CO2 26   BUN 12   CREATININE 0.8   LABGLOM >60   CALCIUM 10.0         APTT:  Recent Labs     01/01/23  0524 01/01/23  1122   APTT 82.4* 67.2*     CARDIAC ENZYMES:  No results for input(s): CKTOTAL, CKMB, CKMBINDEX, TROPHS in the last 72 hours. FASTING LIPID PANEL:  Lab Results   Component Value Date/Time    CHOL 147 12/25/2022 05:13 AM    HDL 63 12/25/2022 05:13 AM    LDLCALC 61 12/25/2022 05:13 AM    TRIG 113 12/25/2022 05:13 AM           CXR 1/24/22:   Cardiomegaly with  patchy perihilar and bibasilar infiltrates and effusions likely CHF/edema and or pneumonia. Soft tissue prominence in the right hilum. Consider surveillance preferably by CT scan. 12 lead EKG 12/24/22:  Sinus tachycardia  Possible Left atrial enlargement  Cannot rule out Inferior infarct , age undetermined  ST & T wave abnormality, consider anterolateral ischemia    Echo 12/26/22 ( Dr. Mitch Torres ):   Summary   The left ventricle is mildly dilated. There is apical wall akinesis, distal septal dyskinesis and distal   inferior wall hypokinesis with thinning corresponding thinning of the   myocardium. Ejection fraction is visually estimated at 40%. There is doppler evidence of stage I diastolic dysfunction. Normal right ventricular size and function. Normal size atria. Mild mitral regurgitation. Mild tricuspid regurgitation. Moderate pulmonary hypertension. Cath 12/28/22 ( Dr. Mitch Torres ):  PRESSURES:  Aorta 114/55 with a mean of 80. Left ventricular systolic pressure 523, left ventricular end-diastolic  pressure 26. There was no significant gradient across the aortic valve.      CORONARY ANGIOGRAPHY:  Left main:  The left main artery has an eccentric 90% distal vessel  stenosis just before the origins of the left anterior descending artery  and the left circumflex. LAD:  The left anterior descending artery is calcified in its proximal  segment. The proximal LAD is diffusely diseased with up to 95% luminal  narrowing. There is also 80 to 90% disease of the LAD after the  diagonal branch in the middle segment. The distal LAD after this lesion  over a long segment did not appear to have any significant angiographic  disease. The diagonal branch itself did not appear to have any  significant angiographic disease. LCX:  The left circumflex is a large but nondominant vessel. It gives  rise to two small first and second marginal branches. There is 50%  narrowing in the mid left circumflex before a large marginal branch and  the terminal lateral branch. RCA:  The right coronary artery is a dominant vessel providing the  posterior descending artery branch and is occluded at the mid vessel  level with the distal vessel filling from right-to-right collaterals. LEFT VENTRICULOGRAPHY:  The left ventricle is dilated. There was  moderate generalized hypokinesis more so involving the left ventricular  apex with an estimated ejection fraction of 35 to 40%. CONCLUSIONS:  1. Severe multivessel coronary artery disease as described above. 2.  Dilated left ventricle with an estimated ejection fraction of 35 to  40%. 3.  Elevated left ventricular end-diastolic pressure. BRISEIDA 12/30/22 ( Dr. Korey Blair ):  Overall ejection fraction moderately decreased. Normal right ventricular size and function. Moderately dilated left atrium. Moderate mitral regurgitation which appears function secondary to the   inferolateral wall tethering the papillary. Mild tricuspid regurgitation. RVSP is 35 mmHg. [address] 12/30/33 ( Dr. Gilberto Landry ):  PRESSURES:  RA 17/16 (14). RV 46/13, 10.  PA 53/24 (36). PCW 28/47 (34). OXYGEN SATURATIONS:  Femoral artery (pulse oximetry) 93%. Pulmonary artery 61.1%.      THERMODILUTION CARDIAC OUTPUT:  4.02 liters per minute; cardiac index  2.3 liters per minute per meter square. CARDIAC OUTPUT (Rylie):  3.59 liters per minute; cardiac index 2.1 liters  per minute per meter square. CONCLUSIONS:  1.  Moderate pulmonary hypertension. 2.  Elevated pulmonary capillary wedge pressure. 3.  Reduced cardiac output/cardiac index. Telemetry: SR ~ 60      ASSESSMENT:   1.  Non-ST elevation MI, remains CP free  2. Ischemic cardiomyopathy with moderate LV systolic dysfunction ( EF ~ 40% on Echo 12/26/22 )  3. Acute HFrEF  4. Mild MR, Mild TR and moderate pulmonary HTN on Echo 12/26/22.   5.  RHC also showing moderate PHTN likely the result of left sided failure ( prior to initiating ACE I therapy , oral diuretics and spironolactone ). 5.  Hypertension: Controlled   6. Hyperlipidemia: Simvastatin    7. Anemia. H&H stable. EGD 12/27/22: small hiatal hernia with mild antral gastritis --> per general surgery: \" .EGD ok, no upper GIB, highly unlikely to be LGIB. [id number] aMhsa Carver for cardiac catheterization and DATP if indicated \".           PLAN:  Continue current cardiac medications   CABG tentatively Tuesday 1/3/2023 per CT surgery  Will continue to follow    Electronically signed by Roma Vallejo MD on 1/1/2023 at 12:01 PM

## 2023-01-01 NOTE — PROGRESS NOTES
CC: MVCAD     Brief HPI:  Patient seen . Awake, alert. No complaints. Past Medical History:   Diagnosis Date    ARJUN (cerebral atherosclerosis)     History of blood transfusion     Hyperlipidemia     Hypertension      Past Surgical History:   Procedure Laterality Date    CARDIAC CATHETERIZATION  12/30/2022    Dr. Imer Jaramillo ENDOSCOPY N/A 12/27/2022    EGD ESOPHAGOGASTRODUODENOSCOPY performed by Magalys Rick MD at [address] [facility] [address] History     Socioeconomic History    Marital status:      Spouse name: Not on file    Number of children: Not on file    Years of education: Not on file    Highest education level: Not on file   Occupational History    Not on file   Tobacco Use    Smoking status: Never    Smokeless tobacco: Never   Vaping Use    Vaping Use: Never used   Substance and Sexual Activity    Alcohol use: No    Drug use: No    Sexual activity: Not on file   Other Topics Concern    Not on file   Social History Narrative    Not on file     Social Determinants of Health     Financial Resource Strain: Not on file   Food Insecurity: Not on file   Transportation Needs: Not on file   Physical Activity: Not on file   Stress: Not on file   Social Connections: Not on file   Intimate Partner Violence: Not on file   Housing Stability: Not on file     History reviewed. No pertinent family history. Vitals:    12/31/22 1910 12/31/22 2303 01/01/23 0310 01/01/23 0540   BP: 102/67 120/63 129/73    Pulse: 74 62 70    Resp: 18 18 18    Temp: 97.1 °F (36.2 °C) 97.7 °F (36.5 °C) 98.3 °F (36.8 °C)    TempSrc: Temporal Temporal Temporal    SpO2: 96% 95% 98%    Weight:    144 lb 6.4 oz (65.5 kg)   Height:               Intake/Output Summary (Last 24 hours) at 1/1/2023 0742  Last data filed at 1/1/2023 0644  Gross per 24 hour   Intake 360 ml   Output 1900 ml   Net -1540 ml         No results for input(s): WBC, HGB, HCT, PLT in the last 72 hours.    Recent Labs     12/31/22  0552   BUN 12   CREATININE 0.8         Creatinine   Date/Time Value Ref Range Status   12/31/2022 05:52 AM 0.8 0.5 - 1.0 mg/dL Final   12/29/2022 05:15 AM 0.7 0.5 - 1.0 mg/dL Final   12/28/2022 06:14 AM 0.7 0.5 - 1.0 mg/dL Final         ROS:   Negative for CP, palpitations, SOB at rest, dizziness/lightheadedness. Physical Exam   Constitutional: Oriented to person, place, and time. Appears well-developed. No distress. Cardiovascular: Normal rate, regular rhythm and normal heart sounds    Pulmonary/Chest: Effort normal. No respiratory distress. Abdominal: Soft. Bowel sounds are normal.   Musculoskeletal: Normal range of motion. Neurological: alert and oriented to person, place, and time. Skin: Skin is warm and dry. Psychiatric: normal mood and affect.        ASSESSMENT:  Patient Active Problem List   Diagnosis    Bilateral carotid artery stenosis    NSTEMI (non-ST elevated myocardial infarction) ([facility])    Acute heart failure ([facility])    Systolic murmur    EKG, abnormal    Abnormal nuclear stress test    Primary hypertension    Hyperlipidemia    Carotid artery disease ([facility])    CAD (coronary artery disease)           PLAN:  Tentative plan for OR Tue afternoon  For RHC and BRISEIDA completed   On Imdur, ACE and Heparin   WILL STOP ACE AFTER TODAY DOSE         Pre-operative testing review:   --carotids right with no significant stenosis, left with 50-69% stenosis   --shemar with good flow bilaterally  --ct chest reviewed  --BRISEIDA with moderate MR  --PFTs  FEV1 54% predicted and DLCO 64% predicted   --hgA1c 5.4    Recent Labs     12/29/22  0515   HGB 10.6*   HCT 32.2*        Recent Labs     12/31/22  0552   BUN 12   CREATININE 0.8     Lab Results   Component Value Date/Time    LABURIN Growth not present 12/29/2022 07:00 AM         [id number] Score for Atrial Fibrillation Stroke Risk   Risk   Factors  Component Value   C CHF  1   H HTN  1   A2 Age >= 75  2   D DM  0   S2 Prior Stroke/TIA  0   V Vascular Disease  1   A Age 74-69  0   Sc Sex  1    [id number]  Score  6             Preoperative NYHA Class: III     Note: 25 minutes was spent providing face-to-face patient care, including:  and coordinating care, reviewing the chart, labs, and diagnostics, as well as medical decision making. Greater than 50% of this time was spent instructing and counseling the patient face to face regarding findings and recommendations. Patient seen and examined. No complaints.   Tentative plans for OR Tuesday afternoon    [address] [facility]  Cardiothoracic Surgery

## 2023-01-01 NOTE — PROGRESS NOTES
Hospitalist Progress Note      PCP: Marcy Reis MD    Date of Admission: 12/24/2022        Hospital Course:  HAD BEEN HAVING CHEST PAIN, AND WAS FOR A HEART CATH AFTER THE 1ST OF THE YEAR.   IT BECAME WORSE AND SHE CAME TO THE ER, FOUND TO HAVE A NSTEMI **    FOR EGD TODAY, DUE TO DECREASING HGB  **IT SHOWED GASTRITIS**  HAS 3 V DISEASE**  CTA SURGERY ON TUESDAY              Subjective:  HAVING PERIODS OF ANXIETY           Medications:  Reviewed    Infusion Medications    heparin (PORCINE) Infusion 10 Units/kg/hr (01/01/23 0640)    sodium chloride 20 mL/hr at 12/31/22 2232    sodium chloride       Scheduled Medications    lisinopril  10 mg Oral Daily    amLODIPine  5 mg Oral Daily    furosemide  20 mg Oral Daily    spironolactone  25 mg Oral Daily    mupirocin   Nasal BID    isosorbide mononitrate  60 mg Oral Daily    lactulose  20 g Oral BID    pantoprazole  40 mg Oral QAM AC    metoprolol succinate  50 mg Oral BID    zolpidem  10 mg Oral Nightly    sodium chloride flush  5-40 mL IntraVENous 2 times per day    aspirin  81 mg Oral Daily    simvastatin  40 mg Oral Nightly     PRN Meds: nitroGLYCERIN, heparin (porcine), heparin (porcine), sodium chloride flush, sodium chloride, ondansetron **OR** ondansetron, acetaminophen **OR** acetaminophen, polyethylene glycol, nitroGLYCERIN, magnesium sulfate, potassium chloride **OR** potassium alternative oral replacement **OR** potassium chloride, ALPRAZolam      Intake/Output Summary (Last 24 hours) at 1/1/2023 0844  Last data filed at 1/1/2023 0644  Gross per 24 hour   Intake 360 ml   Output 1900 ml   Net -1540 ml       Exam:    /66   Pulse 63   Temp 97.9 °F (36.6 °C) (Oral)   Resp 16   Ht 5' 4\" (1.626 m)   Wt 144 lb 6.4 oz (65.5 kg)   SpO2 96%   BMI 24.79 kg/m²     Gen:  WELL DEVELOPED  HEENT: NC/AT, moist mucous membranes,   Neck: supple, trachea midline, no anterior cervical or SC LAD  Heart:  Normal s1/s2, RRR,  Lungs:  CTA  bilaterally,  Abd: bowel sounds present, soft, nontender, nondistended, no masses  Extrem:  No clubbing, cyanosis,   NO  edema  Skin: no rashes or lesions  Psych: A & O x3  Neuro: grossly intact, moves all four extremities. Labs:   No results for input(s): WBC, HGB, HCT, PLT in the last 72 hours. Recent Labs     12/31/22  0552      K 3.8      CO2 26   BUN 12   CREATININE 0.8   CALCIUM 10.0     No results for input(s): AST, ALT, BILIDIR, BILITOT, ALKPHOS in the last 72 hours. No results for input(s): INR in the last 72 hours. No results for input(s): Donzella Rands in the last 72 hours. No results for input(s): AST, ALT, ALB, BILIDIR, BILITOT, ALKPHOS in the last 72 hours. No results for input(s): LACTA in the last 72 hours. No results found for: Gaby Pink  No results found for: AMMONIA    Assessment:    Active Hospital Problems    Diagnosis Date Noted    NSTEMI (non-ST elevated myocardial infarction) ([facility] [address] [I21.4] 12/24/2022     Priority: Medium    Acute heart failure ([facility] [I50.9] 12/24/2022     Priority: Medium    Systolic murmur [I43.9] 95/95/1566     Priority: Medium    EKG, abnormal [R94.31] 12/24/2022     Priority: Medium    Abnormal nuclear stress test [R94.39] 12/24/2022     Priority: Medium    Primary hypertension [I10] 12/24/2022     Priority: Medium    Hyperlipidemia [E78.5] 12/24/2022     Priority: Medium    Carotid artery disease ([facility] [address] [I77.9] 12/24/2022     Priority: Medium    CAD (coronary artery disease) [I25.10] 12/24/2022   CONSTIPATION   ANEMIA( iron def)      Plan:    SURGERY ON Tuesday  HEPARIN   ZOCOR   NORVASC   LACTULOSE  DVT Prophylaxis:  HEPARIN  Diet: ADULT DIET; Regular;  No Caffeine  Diet NPO Exceptions are: Sips of Water with Meds  Code Status: Full Code     PT/OT Eval Status:  ORDERED     Dispo -  HOME        Electronically signed by Stephen Miller DO on 1/1/2023 at 8:44 AM [facility]

## 2023-01-02 LAB
ABO/RH: NORMAL
ALBUMIN SERPL-MCNC: 3.8 G/DL (ref 3.5–5.2)
ALP BLD-CCNC: 71 U/L (ref 35–104)
ALT SERPL-CCNC: 19 U/L (ref 0–32)
ANION GAP SERPL CALCULATED.3IONS-SCNC: 12 MMOL/L (ref 7–16)
ANTIBODY SCREEN: NORMAL
APTT: 101 SEC (ref 24.5–35.1)
APTT: 63.5 SEC (ref 24.5–35.1)
APTT: 64.2 SEC (ref 24.5–35.1)
APTT: 68.2 SEC (ref 24.5–35.1)
AST SERPL-CCNC: 15 U/L (ref 0–31)
BILIRUB SERPL-MCNC: 0.6 MG/DL (ref 0–1.2)
BUN BLDV-MCNC: 18 MG/DL (ref 6–23)
CALCIUM SERPL-MCNC: 10.3 MG/DL (ref 8.6–10.2)
CHLORIDE BLD-SCNC: 104 MMOL/L (ref 98–107)
CO2: 23 MMOL/L (ref 22–29)
CREAT SERPL-MCNC: 0.9 MG/DL (ref 0.5–1)
GFR SERPL CREATININE-BSD FRML MDRD: >60 ML/MIN/1.73
GLUCOSE BLD-MCNC: 114 MG/DL (ref 74–99)
HCT VFR BLD CALC: 34.9 % (ref 34–48)
HEMOGLOBIN: 11.6 G/DL (ref 11.5–15.5)
INR BLD: 1.2
MCH RBC QN AUTO: 29 PG (ref 26–35)
MCHC RBC AUTO-ENTMCNC: 33.2 % (ref 32–34.5)
MCV RBC AUTO: 87.3 FL (ref 80–99.9)
PDW BLD-RTO: 12.7 FL (ref 11.5–15)
PLATELET # BLD: 286 E9/L (ref 130–450)
PMV BLD AUTO: 8.9 FL (ref 7–12)
POTASSIUM SERPL-SCNC: 4.1 MMOL/L (ref 3.5–5)
PROTHROMBIN TIME: 12.9 SEC (ref 9.3–12.4)
RBC # BLD: 4 E12/L (ref 3.5–5.5)
SODIUM BLD-SCNC: 139 MMOL/L (ref 132–146)
TOTAL PROTEIN: 7.2 G/DL (ref 6.4–8.3)
WBC # BLD: 4.9 E9/L (ref 4.5–11.5)

## 2023-01-02 PROCEDURE — 85027 COMPLETE CBC AUTOMATED: CPT

## 2023-01-02 PROCEDURE — 99232 SBSQ HOSP IP/OBS MODERATE 35: CPT | Performed by: INTERNAL MEDICINE

## 2023-01-02 PROCEDURE — 6370000000 HC RX 637 (ALT 250 FOR IP): Performed by: INTERNAL MEDICINE

## 2023-01-02 PROCEDURE — 86901 BLOOD TYPING SEROLOGIC RH(D): CPT

## 2023-01-02 PROCEDURE — 86923 COMPATIBILITY TEST ELECTRIC: CPT

## 2023-01-02 PROCEDURE — 86850 RBC ANTIBODY SCREEN: CPT

## 2023-01-02 PROCEDURE — 6360000002 HC RX W HCPCS: Performed by: INTERNAL MEDICINE

## 2023-01-02 PROCEDURE — 85730 THROMBOPLASTIN TIME PARTIAL: CPT

## 2023-01-02 PROCEDURE — 80053 COMPREHEN METABOLIC PANEL: CPT

## 2023-01-02 PROCEDURE — 86900 BLOOD TYPING SEROLOGIC ABO: CPT

## 2023-01-02 PROCEDURE — 36415 COLL VENOUS BLD VENIPUNCTURE: CPT

## 2023-01-02 PROCEDURE — 6370000000 HC RX 637 (ALT 250 FOR IP): Performed by: NURSE PRACTITIONER

## 2023-01-02 PROCEDURE — 2580000003 HC RX 258: Performed by: INTERNAL MEDICINE

## 2023-01-02 PROCEDURE — 6370000000 HC RX 637 (ALT 250 FOR IP): Performed by: PHYSICIAN ASSISTANT

## 2023-01-02 PROCEDURE — 2140000000 HC CCU INTERMEDIATE R&B

## 2023-01-02 PROCEDURE — 99232 SBSQ HOSP IP/OBS MODERATE 35: CPT | Performed by: THORACIC SURGERY (CARDIOTHORACIC VASCULAR SURGERY)

## 2023-01-02 PROCEDURE — 85610 PROTHROMBIN TIME: CPT

## 2023-01-02 PROCEDURE — 2580000003 HC RX 258: Performed by: PHYSICIAN ASSISTANT

## 2023-01-02 RX ORDER — SODIUM CHLORIDE 0.9 % (FLUSH) 0.9 %
5-40 SYRINGE (ML) INJECTION PRN
Status: DISCONTINUED | OUTPATIENT
Start: 2023-01-02 | End: 2023-01-03

## 2023-01-02 RX ORDER — METOPROLOL SUCCINATE 25 MG/1
25 TABLET, EXTENDED RELEASE ORAL 2 TIMES DAILY
Status: DISCONTINUED | OUTPATIENT
Start: 2023-01-02 | End: 2023-01-03

## 2023-01-02 RX ORDER — SODIUM CHLORIDE 9 MG/ML
INJECTION, SOLUTION INTRAVENOUS PRN
Status: DISCONTINUED | OUTPATIENT
Start: 2023-01-02 | End: 2023-01-03

## 2023-01-02 RX ORDER — CHLORHEXIDINE GLUCONATE 0.12 MG/ML
15 RINSE ORAL ONCE
Status: COMPLETED | OUTPATIENT
Start: 2023-01-03 | End: 2023-01-03

## 2023-01-02 RX ORDER — CHLORHEXIDINE GLUCONATE 4 G/100ML
SOLUTION TOPICAL SEE ADMIN INSTRUCTIONS
Status: DISCONTINUED | OUTPATIENT
Start: 2023-01-02 | End: 2023-01-03 | Stop reason: HOSPADM

## 2023-01-02 RX ORDER — SODIUM CHLORIDE 0.9 % (FLUSH) 0.9 %
5-40 SYRINGE (ML) INJECTION EVERY 12 HOURS SCHEDULED
Status: DISCONTINUED | OUTPATIENT
Start: 2023-01-02 | End: 2023-01-03

## 2023-01-02 RX ADMIN — CHLORHEXIDINE GLUCONATE: 213 SOLUTION TOPICAL at 19:00

## 2023-01-02 RX ADMIN — SODIUM CHLORIDE: 9 INJECTION, SOLUTION INTRAVENOUS at 12:05

## 2023-01-02 RX ADMIN — MUPIROCIN: 20 OINTMENT TOPICAL at 22:01

## 2023-01-02 RX ADMIN — PANTOPRAZOLE SODIUM 40 MG: 40 TABLET, DELAYED RELEASE ORAL at 05:48

## 2023-01-02 RX ADMIN — METOPROLOL SUCCINATE 25 MG: 25 TABLET, EXTENDED RELEASE ORAL at 09:07

## 2023-01-02 RX ADMIN — SPIRONOLACTONE 25 MG: 25 TABLET ORAL at 09:08

## 2023-01-02 RX ADMIN — ASPIRIN 81 MG CHEWABLE TABLET 81 MG: 81 TABLET CHEWABLE at 09:07

## 2023-01-02 RX ADMIN — ALPRAZOLAM 0.25 MG: 0.25 TABLET ORAL at 14:46

## 2023-01-02 RX ADMIN — FUROSEMIDE 20 MG: 40 TABLET ORAL at 09:07

## 2023-01-02 RX ADMIN — GUAIFENESIN 400 MG: 400 TABLET ORAL at 22:01

## 2023-01-02 RX ADMIN — ZOLPIDEM TARTRATE 10 MG: 5 TABLET ORAL at 22:02

## 2023-01-02 RX ADMIN — Medication 40 MG: at 22:02

## 2023-01-02 RX ADMIN — MUPIROCIN: 20 OINTMENT TOPICAL at 22:02

## 2023-01-02 RX ADMIN — SODIUM CHLORIDE, PRESERVATIVE FREE 10 ML: 5 INJECTION INTRAVENOUS at 09:09

## 2023-01-02 RX ADMIN — HEPARIN SODIUM 10 UNITS/KG/HR: 10000 INJECTION, SOLUTION INTRAVENOUS at 06:40

## 2023-01-02 RX ADMIN — SODIUM CHLORIDE, PRESERVATIVE FREE 10 ML: 5 INJECTION INTRAVENOUS at 22:02

## 2023-01-02 RX ADMIN — AMLODIPINE BESYLATE 5 MG: 5 TABLET ORAL at 09:08

## 2023-01-02 RX ADMIN — HEPARIN SODIUM 10 UNITS/KG/HR: 10000 INJECTION, SOLUTION INTRAVENOUS at 03:36

## 2023-01-02 RX ADMIN — METOPROLOL SUCCINATE 25 MG: 25 TABLET, EXTENDED RELEASE ORAL at 22:01

## 2023-01-02 RX ADMIN — MUPIROCIN: 20 OINTMENT TOPICAL at 09:08

## 2023-01-02 RX ADMIN — GUAIFENESIN 400 MG: 400 TABLET ORAL at 15:16

## 2023-01-02 RX ADMIN — GUAIFENESIN 400 MG: 400 TABLET ORAL at 09:06

## 2023-01-02 ASSESSMENT — PAIN SCALES - GENERAL
PAINLEVEL_OUTOF10: 0

## 2023-01-02 NOTE — PROGRESS NOTES
Hospitalist Progress Note      PCP: Little Lopez MD    Date of Admission: 12/24/2022        Hospital Course:  HAD BEEN HAVING CHEST PAIN, AND WAS FOR A HEART CATH AFTER THE 1ST OF THE YEAR.   IT BECAME WORSE AND SHE CAME TO THE ER, FOUND TO HAVE A NSTEMI **    FOR EGD TODAY, DUE TO DECREASING HGB  **IT SHOWED GASTRITIS**  HAS 3 V DISEASE**  CTA SURGERY ON TUESDAY        Subjective:  STILL HAVING SOME ANXIETY           Medications:  Reviewed    Infusion Medications    sodium chloride      heparin (PORCINE) Infusion 10 Units/kg/hr (01/02/23 0640)    sodium chloride 20 mL/hr at 01/02/23 1205    sodium chloride       Scheduled Medications    metoprolol succinate  25 mg Oral BID    sodium chloride flush  5-40 mL IntraVENous 2 times per day    [START ON 1/3/2023] ceFAZolin (ANCEF) IVPB  2,000 mg IntraVENous On Call to OR    mupirocin   Nasal BID    [START ON 1/3/2023] chlorhexidine  15 mL Mouth/Throat Once    chlorhexidine   Topical See Admin Instructions    guaiFENesin  400 mg Oral TID    amLODIPine  5 mg Oral Daily    furosemide  20 mg Oral Daily    spironolactone  25 mg Oral Daily    mupirocin   Nasal BID    [Held by provider] isosorbide mononitrate  60 mg Oral Daily    lactulose  20 g Oral BID    pantoprazole  40 mg Oral QAM AC    zolpidem  10 mg Oral Nightly    sodium chloride flush  5-40 mL IntraVENous 2 times per day    aspirin  81 mg Oral Daily    simvastatin  40 mg Oral Nightly     PRN Meds: sodium chloride flush, sodium chloride, nitroGLYCERIN, heparin (porcine), heparin (porcine), sodium chloride flush, sodium chloride, ondansetron **OR** ondansetron, acetaminophen **OR** acetaminophen, polyethylene glycol, nitroGLYCERIN, magnesium sulfate, potassium chloride **OR** potassium alternative oral replacement **OR** potassium chloride, ALPRAZolam      Intake/Output Summary (Last 24 hours) at 1/2/2023 1325  Last data filed at 1/2/2023 0640  Gross per 24 hour   Intake 2319.28 ml   Output 400 ml   Net 1919.28 ml       Exam:    BP (!) 104/58 Comment: left arm  Pulse 61   Temp 98.6 °F (37 °C) (Temporal)   Resp 18   Ht 5' 4\" (1.626 m)   Wt 143 lb 9.6 oz (65.1 kg)   SpO2 95%   BMI 24.65 kg/m²       Gen:  WELL DEVELOPED  HEENT: NC/AT, moist mucous membranes,   Neck: supple, trachea midline, no anterior cervical or SC LAD  Heart:  Normal s1/s2, RRR,  Lungs:  CTA  bilaterally,  Abd: bowel sounds present, soft, nontender, nondistended, no masses  Extrem:  No clubbing, cyanosis,   NO  edema  Skin: no rashes or lesions  Psych: A & O x3  Neuro: grossly intact, moves all four extremities. Labs:   Recent Labs     01/02/23  0950   WBC 4.9   HGB 11.6   HCT 34.9        Recent Labs     12/31/22  0552 01/02/23  0950    139   K 3.8 4.1    104   CO2 26 23   BUN 12 18   CREATININE 0.8 0.9   CALCIUM 10.0 10.3*     Recent Labs     01/02/23  0950   AST 15   ALT 19   BILITOT 0.6   ALKPHOS 71     Recent Labs     01/02/23  0950   INR 1.2     No results for input(s): Doyne Knock in the last 72 hours. Recent Labs     01/02/23  0950   AST 15   ALT 19   BILITOT 0.6   ALKPHOS 71     No results for input(s): LACTA in the last 72 hours.   No results found for: Javier Symone  No results found for: AMMONIA    Assessment:    Active Hospital Problems    Diagnosis Date Noted    NSTEMI (non-ST elevated myocardial infarction) ([facility] [address] [I21.4] 12/24/2022     Priority: Medium    Acute heart failure ([facility] [address] [I50.9] 12/24/2022     Priority: Medium    Systolic murmur [R65.0] 99/53/1325     Priority: Medium    EKG, abnormal [R94.31] 12/24/2022     Priority: Medium    Abnormal nuclear stress test [R94.39] 12/24/2022     Priority: Medium    Primary hypertension [I10] 12/24/2022     Priority: Medium    Hyperlipidemia [E78.5] 12/24/2022     Priority: Medium    Carotid artery disease ([facility] [address] [I77.9] 12/24/2022     Priority: Medium    CAD (coronary artery disease) [I25.10] 12/24/2022      CAD (coronary artery disease) [I25.10] 12/24/2022   CONSTIPATION   ANEMIA( iron def)        Plan:    SURGERY ON Tuesday  HEPARIN   ZOCOR   NORVASC   LACTULOSE  DVT Prophylaxis:  HEPARIN  Diet: ADULT DIET; Regular;  No Caffeine  Diet NPO Exceptions are: Sips of Water with Meds  Code Status: Full Code     PT/OT Eval Status:  ORDERED     Dispo -  HOME       Electronically signed by Jordan Cota DO on 1/2/2023 at 1:25 PM [facility]

## 2023-01-02 NOTE — PROGRESS NOTES
CC: MVCAD     Brief HPI:  Patient seen  Awake, alert. No complaints. Past Medical History:   Diagnosis Date    ARJUN (cerebral atherosclerosis)     History of blood transfusion     Hyperlipidemia     Hypertension      Past Surgical History:   Procedure Laterality Date    CARDIAC CATHETERIZATION  12/30/2022    Dr. Ace Mcintoshlashawn Velezjerardo ENDOSCOPY N/A 12/27/2022    EGD ESOPHAGOGASTRODUODENOSCOPY performed by Kodak Escobar MD at [address] History     Socioeconomic History    Marital status:      Spouse name: Not on file    Number of children: Not on file    Years of education: Not on file    Highest education level: Not on file   Occupational History    Not on file   Tobacco Use    Smoking status: Never    Smokeless tobacco: Never   Vaping Use    Vaping Use: Never used   Substance and Sexual Activity    Alcohol use: No    Drug use: No    Sexual activity: Not on file   Other Topics Concern    Not on file   Social History Narrative    Not on file     Social Determinants of Health     Financial Resource Strain: Not on file   Food Insecurity: Not on file   Transportation Needs: Not on file   Physical Activity: Not on file   Stress: Not on file   Social Connections: Not on file   Intimate Partner Violence: Not on file   Housing Stability: Not on file     History reviewed. No pertinent family history. Vitals:    01/01/23 2315 01/02/23 0246 01/02/23 0530 01/02/23 0708   BP: (!) 108/55 128/69  (!) 117/59   Pulse: 69 80  66   Resp: 18 16  18   Temp: 98.6 °F (37 °C) 98.2 °F (36.8 °C)  98.3 °F (36.8 °C)   TempSrc: Temporal Temporal  Temporal   SpO2: 94% 94%  96%   Weight:   143 lb 9.6 oz (65.1 kg)    Height:               Intake/Output Summary (Last 24 hours) at 1/2/2023 0839  Last data filed at 1/2/2023 0640  Gross per 24 hour   Intake 2319.28 ml   Output 1400 ml   Net 919.28 ml         No results for input(s): WBC, HGB, HCT, PLT in the last 72 hours. Recent Labs     12/31/22  0552   BUN 12   CREATININE 0.8         Creatinine   Date/Time Value Ref Range Status   12/31/2022 05:52 AM 0.8 0.5 - 1.0 mg/dL Final   12/29/2022 05:15 AM 0.7 0.5 - 1.0 mg/dL Final   12/28/2022 06:14 AM 0.7 0.5 - 1.0 mg/dL Final         ROS:   Negative for CP, palpitations, SOB at rest, dizziness/lightheadedness. Physical Exam   Constitutional: Oriented to person, place, and time. Appears well-developed. No distress. Cardiovascular: Normal rate, regular rhythm and normal heart sounds   Pulmonary/Chest: Effort normal. No respiratory distress. Abdominal: Soft. Bowel sounds are normal.   Musculoskeletal: Normal range of motion. Neurological: alert and oriented to person, place, and time. Skin: Skin is warm and dry. Psychiatric: normal mood and affect.        ASSESSMENT:  Patient Active Problem List   Diagnosis    Bilateral carotid artery stenosis    NSTEMI (non-ST elevated myocardial infarction) ([facility] [address]    Acute heart failure ([facility])    Systolic murmur    EKG, abnormal    Abnormal nuclear stress test    Primary hypertension    Hyperlipidemia    Carotid artery disease ([facility])    CAD (coronary artery disease)             PLAN:  plan for OR Tue afternoon- NPO after midnight   ACE and Imdur stopped today    DC Heparin tomm am           Pre-operative testing review:   --carotids right with no significant stenosis, left with 50-69% stenosis   --shemar with good flow bilaterally  --ct chest reviewed  --BRISEIDA with moderate MR  --PFTs  FEV1 54% predicted and DLCO 64% predicted   --hgA1c 5.4      Recent Labs     12/29/22  0515   HGB 10.6*   HCT 32.2*        Recent Labs     12/31/22  0552   BUN 12   CREATININE 0.8     Lab Results   Component Value Date/Time    LABURIN Growth not present 12/29/2022 07:00 AM         [id number] Score for Atrial Fibrillation Stroke Risk   Risk   Factors  Component Value   C CHF  1   H HTN  1   A2 Age >= 75  2   D DM  0   S2 Prior Stroke/TIA  0   V Vascular Disease  1   A Age 74-69  0   Sc Sex  1    [id number]  Score  6           Preoperative NYHA Class: III     Note: 25 minutes was spent providing face-to-face patient care, including:  and coordinating care, reviewing the chart, labs, and diagnostics, as well as medical decision making. Greater than 50% of this time was spent instructing and counseling the patient face to face regarding findings and recommendations. Seen and examined. As above. For OR tomorrow. Consent signed. CABG to LAD, OM and hopefully RCA.   Constance Donato MD

## 2023-01-02 NOTE — CONSULTS
Met with patient to discuss what to expect from a cardiac rehab stand-point post-surgery. Reviewed walking 400 feet, practicing stairs, arm use, and sternal precautions. Patient stated she should be able to acheive these goals. We will continue to follow patient after surgery.

## 2023-01-02 NOTE — PROGRESS NOTES
Patient is seen in follow-up for non-ST elevation MI    Subjective:     Ms. Cirilo Stark feels okay today, denies any chest pain or shortness of breath  Sitting up in bed no apparent distress    ROS:  CONSTITUTIONAL:  negative for  fevers, chills  HEENT:  negative for earaches, nasal congestion and epistaxis  RESPIRATORY:  negative for  dry cough, cough with sputum,wheezing and hemoptysis  GASTROINTESTINAL:  negative for nausea, vomiting  MUSCULOSKELETAL:  negative for  myalgias, arthralgias  NEUROLOGICAL:  negative for visual disturbance, dysphagia    Medication side effects: none    Scheduled Meds:   metoprolol succinate  25 mg Oral BID    sodium chloride flush  5-40 mL IntraVENous 2 times per day    [START ON 1/3/2023] ceFAZolin (ANCEF) IVPB  2,000 mg IntraVENous On Call to OR    mupirocin   Nasal BID    [START ON 1/3/2023] chlorhexidine  15 mL Mouth/Throat Once    chlorhexidine   Topical See Admin Instructions    guaiFENesin  400 mg Oral TID    amLODIPine  5 mg Oral Daily    furosemide  20 mg Oral Daily    spironolactone  25 mg Oral Daily    mupirocin   Nasal BID    [Held by provider] isosorbide mononitrate  60 mg Oral Daily    lactulose  20 g Oral BID    pantoprazole  40 mg Oral QAM AC    zolpidem  10 mg Oral Nightly    sodium chloride flush  5-40 mL IntraVENous 2 times per day    aspirin  81 mg Oral Daily    simvastatin  40 mg Oral Nightly     Continuous Infusions:   sodium chloride      heparin (PORCINE) Infusion 10 Units/kg/hr (01/02/23 0640)    sodium chloride 20 mL/hr at 01/02/23 1205    sodium chloride       PRN Meds:sodium chloride flush, sodium chloride, nitroGLYCERIN, heparin (porcine), heparin (porcine), sodium chloride flush, sodium chloride, ondansetron **OR** ondansetron, acetaminophen **OR** acetaminophen, polyethylene glycol, nitroGLYCERIN, magnesium sulfate, potassium chloride **OR** potassium alternative oral replacement **OR** potassium chloride, ALPRAZolam    I/O last 3 completed shifts:   In: 2319.3 [P.O.:240; I.V.:2079.3]  Out: 2250 [Urine:2250]  No intake/output data recorded. Objective:      Physical Exam:   BP (!) 160/75   Pulse 89   Temp 98.6 °F (37 °C) (Temporal)   Resp 18   Ht 5' 4\" (1.626 m)   Wt 143 lb 9.6 oz (65.1 kg)   SpO2 98%   BMI 24.65 kg/m²   CONSTITUTIONAL:  awake, alert, cooperative, no apparent distress, and appears stated age  HEAD:  normocepalic, without obvious abnormality, atraumatic  NECK:  Supple, symmetrical, trachea midline, no adenopathy, thyroid symmetric, not enlarged and no tenderness, skin normal  LUNGS:  No increased work of breathing, No accessory muscle use or intercostal retractions, good air exchange, clear to auscultation bilaterally, no crackles or wheezing  CARDIOVASCULAR:  Normal apical impulse, regular rate and rhythm, normal S1 and S2, no S3 or S4, and no murmur noted, no edema, no JVD, no carotid bruit. ABDOMEN:  Soft, nontender, no masses, no hepatomegaly, no splenomegaly, BS+  MUSCULOSKELETAL:  No clubbing no cyanosis. there is no redness, warmth, or swelling of the joints  full range of motion noted  NEUROLOGIC:  Alert, awake,oriented x3  SKIN:  no bruising or bleeding, normal skin color, texture, turgor and no redness, warmth, or swelling      Cardiographics  I personally reviewed the telemetry monitor strips with the following interpretation: Sinus rhythm    Echocardiogram: 12/26/2022,Summary   The left ventricle is mildly dilated. There is apical wall akinesis, distal septal dyskinesis and distal   inferior wall hypokinesis with thinning corresponding thinning of the   myocardium. Ejection fraction is visually estimated at 40%. There is doppler evidence of stage I diastolic dysfunction. Normal right ventricular size and function. Normal size atria. Mild mitral regurgitation. Mild tricuspid regurgitation. Moderate pulmonary hypertension.     Imaging  CT CHEST WO CONTRAST   Final Result   Limited evaluation of vascular structures due to lack of intravenous contrast   however no significant aneurysmal dilatation of the thoracic aorta maximum   transaxial diameter supravalvular ascending portion 3.4 cm with calcific   involvement distal transverse through descending portion which appears   nonaneurysmal as well. Cardiac size enlarged with four-chamber cardiomegaly   demonstrating coronary calcifications. No pericardial effusion      Small bilateral pleural effusions right greater than left with minimal   adjacent atelectasis. US DUP LOWER EXTREMITY MAPPING BILAT VENOUS   Final Result   1. Bilateral venous mapping as described. 2. No superficial venous thrombosis visualized. US CAROTID ARTERY BILATERAL   Final Result   Atherosclerotic disease. Estimated stenosis by NASCET criteria in the proximal right carotid   artery is between 0% to 49% . Estimated stenosis by NASCET criteria in the proximal left carotid   artery is between 50% to 69%. VL LUCIANO BILATERAL LIMITED 1-2 LEVELS   Final Result      XR CHEST PORTABLE   Final Result   Cardiomegaly with  patchy perihilar and bibasilar infiltrates and effusions   likely CHF/edema and or pneumonia. Soft tissue prominence in the right hilum. Consider surveillance preferably   by CT scan. Lab Review   Lab Results   Component Value Date/Time     01/02/2023 09:50 AM    K 4.1 01/02/2023 09:50 AM    K 4.1 12/29/2022 05:15 AM     01/02/2023 09:50 AM    CO2 23 01/02/2023 09:50 AM    BUN 18 01/02/2023 09:50 AM    CREATININE 0.9 01/02/2023 09:50 AM    GLUCOSE 114 01/02/2023 09:50 AM    CALCIUM 10.3 01/02/2023 09:50 AM     Lab Results   Component Value Date/Time    WBC 4.9 01/02/2023 09:50 AM    HGB 11.6 01/02/2023 09:50 AM    HCT 34.9 01/02/2023 09:50 AM    MCV 87.3 01/02/2023 09:50 AM     01/02/2023 09:50 AM     I have personally reviewed the laboratory, cardiac diagnostic and radiographic testing as outlined above:    Assessment:     1.   Multivessel CAD: For CABG tomorrow  2. Ischemic cardiomyopathy  3. Mitral valve regurgitation: Mild  4. Tricuspid valve regurgitation: Mild  5. Pulmonary hypertension: Moderate  6. Hypertension:   7. Hyperlipidemia: On statin    Recommendations:     1. Continue current treatment  2. For CABG tomorrow    Discussed with patient  Electronically signed by Roger Lyle MD on 1/2/2023 at 6:08 PM  NOTE: This report was transcribed using voice recognition software.  Every effort was made to ensure accuracy; however, inadvertent computerized transcription errors may be present

## 2023-01-03 ENCOUNTER — ANESTHESIA (OUTPATIENT)
Dept: OPERATING ROOM | Age: 78
End: 2023-01-03
Payer: MEDICARE

## 2023-01-03 ENCOUNTER — APPOINTMENT (OUTPATIENT)
Dept: GENERAL RADIOLOGY | Age: 78
DRG: 233 | End: 2023-01-03
Payer: MEDICARE

## 2023-01-03 PROBLEM — G89.18 ACUTE POSTOPERATIVE PAIN: Status: ACTIVE | Noted: 2023-01-03

## 2023-01-03 PROBLEM — J98.4 ACUTE PULMONARY INSUFFICIENCY: Status: ACTIVE | Noted: 2023-01-03

## 2023-01-03 LAB
AADO2: 104.2 MMHG
AADO2: 245.4 MMHG
ACTIVATED CLOTTING TIME: 113 SECONDS (ref 99–130)
ACTIVATED CLOTTING TIME: 117 SECONDS (ref 99–130)
ACTIVATED CLOTTING TIME: 119 SECONDS (ref 99–130)
ACTIVATED CLOTTING TIME: 448 SECONDS (ref 99–130)
ACTIVATED CLOTTING TIME: 468 SECONDS (ref 99–130)
ACTIVATED CLOTTING TIME: 496 SECONDS (ref 99–130)
ACTIVATED CLOTTING TIME: 99 SECONDS (ref 99–130)
ANION GAP SERPL CALCULATED.3IONS-SCNC: 9 MMOL/L (ref 7–16)
ANION GAP: 10 MMOL/L (ref 7–16)
ANION GAP: 10 MMOL/L (ref 7–16)
ANION GAP: 11 MMOL/L (ref 7–16)
ANION GAP: 13 MMOL/L (ref 7–16)
APTT: 30.2 SEC (ref 24.5–35.1)
APTT: 66.2 SEC (ref 24.5–35.1)
B.E.: -2.7 MMOL/L (ref -3–3)
B.E.: -3.4 MMOL/L (ref -3–3)
B.E.: -4.2 MMOL/L (ref -3–3)
B.E.: -5.3 MMOL/L (ref -3–3)
B.E.: 0.8 MMOL/L (ref -3–3)
B.E.: 1.3 MMOL/L (ref -3–3)
BLOOD BANK DISPENSE STATUS: NORMAL
BLOOD BANK DISPENSE STATUS: NORMAL
BLOOD BANK PRODUCT CODE: NORMAL
BLOOD BANK PRODUCT CODE: NORMAL
BPU ID: NORMAL
BPU ID: NORMAL
BUN BLDV-MCNC: 15 MG/DL (ref 6–23)
CALCIUM IONIZED: 1.18 MMOL/L (ref 1.15–1.33)
CALCIUM SERPL-MCNC: 8.3 MG/DL (ref 8.6–10.2)
CARDIOPULMONARY BYPASS: NO
CARDIOPULMONARY BYPASS: NO
CARDIOPULMONARY BYPASS: YES
CARDIOPULMONARY BYPASS: YES
CHLORIDE BLD-SCNC: 107 MMOL/L (ref 98–107)
CO2: 21 MMOL/L (ref 22–29)
COHB: 0.3 % (ref 0–1.5)
COHB: 0.3 % (ref 0–1.5)
COMMENT: ABNORMAL
CREAT SERPL-MCNC: 0.8 MG/DL (ref 0.5–1)
CRITICAL: ABNORMAL
CRITICAL: ABNORMAL
DATE ANALYZED: ABNORMAL
DATE ANALYZED: ABNORMAL
DATE OF COLLECTION: ABNORMAL
DATE OF COLLECTION: ABNORMAL
DESCRIPTION BLOOD BANK: NORMAL
DESCRIPTION BLOOD BANK: NORMAL
DEVICE: ABNORMAL
FIO2: 40 %
FIO2: 60 %
FIO2: 80
GFR SERPL CREATININE-BSD FRML MDRD: >60 ML/MIN/1.73
GFR, ESTIMATED: 58 ML/MIN/1.73
GFR, ESTIMATED: >60 ML/MIN/1.73
GLUCOSE BLD-MCNC: 162 MG/DL (ref 74–99)
GLUCOSE BLD-MCNC: 183 MG/DL (ref 74–99)
GLUCOSE BLD-MCNC: 195 MG/DL (ref 74–99)
GLUCOSE BLD-MCNC: 217 MG/DL (ref 74–99)
GLUCOSE BLD-MCNC: 230 MG/DL (ref 74–99)
HCO3: 20.1 MMOL/L (ref 22–26)
HCO3: 20.3 MMOL/L (ref 22–26)
HCO3: 21.1 MMOL/L (ref 22–26)
HCO3: 21.4 MMOL/L (ref 22–26)
HCO3: 23.9 MMOL/L (ref 22–26)
HCO3: 24.5 MMOL/L (ref 22–26)
HCT VFR BLD CALC: 29.5 % (ref 34–48)
HEMATOCRIT: 21 % (ref 34–48)
HEMATOCRIT: 24 % (ref 34–48)
HEMATOCRIT: 24 % (ref 34–48)
HEMATOCRIT: 25 % (ref 34–48)
HEMOGLOBIN: 7.1 G/DL (ref 11.5–15.5)
HEMOGLOBIN: 8 G/DL (ref 11.5–15.5)
HEMOGLOBIN: 8.2 G/DL (ref 11.5–15.5)
HEMOGLOBIN: 8.4 G/DL (ref 11.5–15.5)
HEMOGLOBIN: 9.6 G/DL (ref 11.5–15.5)
HHB: 2.3 % (ref 0–5)
HHB: 2.6 % (ref 0–5)
INR BLD: 1.3
LAB: ABNORMAL
MAGNESIUM: 2.8 MG/DL (ref 1.6–2.6)
MCH RBC QN AUTO: 29.8 PG (ref 26–35)
MCHC RBC AUTO-ENTMCNC: 32.5 % (ref 32–34.5)
MCV RBC AUTO: 91.6 FL (ref 80–99.9)
METER GLUCOSE: 171 MG/DL (ref 74–99)
METER GLUCOSE: 184 MG/DL (ref 74–99)
METER GLUCOSE: 198 MG/DL (ref 74–99)
METHB: 0.2 % (ref 0–1.5)
METHB: 0.4 % (ref 0–1.5)
MODE: ABNORMAL
MODE: AC
O2 SATURATION: 100 % (ref 92–98.5)
O2 SATURATION: 100 % (ref 92–98.5)
O2 SATURATION: 78.9 % (ref 92–98.5)
O2 SATURATION: 97.4 % (ref 92–98.5)
O2 SATURATION: 97.7 % (ref 92–98.5)
O2 SATURATION: 99.9 % (ref 92–98.5)
O2HB: 96.7 % (ref 94–97)
O2HB: 97.2 % (ref 94–97)
OPERATOR ID: 4510
OPERATOR ID: ABNORMAL
PATIENT TEMP: 37 C
PATIENT TEMP: 37 C
PCO2 37: 29 MMHG (ref 35–45)
PCO2 37: 33.3 MMHG (ref 35–45)
PCO2 37: 34.2 MMHG (ref 35–45)
PCO2 37: 34.4 MMHG (ref 35–45)
PCO2: 34.2 MMHG (ref 35–45)
PCO2: 40.3 MMHG (ref 35–45)
PDW BLD-RTO: 12.4 FL (ref 11.5–15)
PEEP/CPAP: 6 CMH2O
PEEP/CPAP: 6 CMH2O
PFO2: 2.16 MMHG/%
PFO2: 3.12 MMHG/%
PH 37: 7.4 (ref 7.35–7.45)
PH 37: 7.42 (ref 7.35–7.45)
PH 37: 7.46 (ref 7.35–7.45)
PH 37: 7.52 (ref 7.35–7.45)
PH BLOOD GAS: 7.32 (ref 7.35–7.45)
PH BLOOD GAS: 7.39 (ref 7.35–7.45)
PLATELET # BLD: 204 E9/L (ref 130–450)
PMV BLD AUTO: 8.9 FL (ref 7–12)
PO2 37: 293.2 MMHG (ref 60–80)
PO2 37: 42.8 MMHG (ref 60–80)
PO2 37: 455.7 MMHG (ref 60–80)
PO2 37: 534.4 MMHG (ref 60–80)
PO2: 124.7 MMHG (ref 75–100)
PO2: 129.8 MMHG (ref 75–100)
POC BUN: 14 MG/DL (ref 8–23)
POC BUN: 14 MG/DL (ref 8–23)
POC BUN: 16 MG/DL (ref 8–23)
POC BUN: 16 MG/DL (ref 8–23)
POC CHLORIDE: 101 MMOL/L (ref 100–108)
POC CHLORIDE: 103 MMOL/L (ref 100–108)
POC CHLORIDE: 105 MMOL/L (ref 100–108)
POC CHLORIDE: 99 MMOL/L (ref 100–108)
POC CO2: 20.2 MMOL/L (ref 22–29)
POC CO2: 20.5 MMOL/L (ref 22–29)
POC CO2: 22.7 MMOL/L (ref 22–29)
POC CO2: 23.5 MMOL/L (ref 22–29)
POC CREATININE: 0.8 MG/DL (ref 0.5–1)
POC CREATININE: 0.8 MG/DL (ref 0.5–1)
POC CREATININE: 0.9 MG/DL (ref 0.5–1)
POC CREATININE: 1 MG/DL (ref 0.5–1)
POC IONIZED CALCIUM: 1.1 (ref 1.1–1.3)
POC IONIZED CALCIUM: 1.2 (ref 1.1–1.3)
POC LACTIC ACID: 1.1 (ref 0.5–2.2)
POC LACTIC ACID: 1.2 (ref 0.5–2.2)
POC LACTIC ACID: 1.4 (ref 0.5–2.2)
POC LACTIC ACID: 2 (ref 0.5–2.2)
POC SODIUM: 133 MMOL/L (ref 132–146)
POC SODIUM: 133 MMOL/L (ref 132–146)
POC SODIUM: 134 MMOL/L (ref 132–146)
POC SODIUM: 138 MMOL/L (ref 132–146)
POC SOURCE: ABNORMAL
POTASSIUM SERPL-SCNC: 3.8 MMOL/L (ref 3.5–5)
POTASSIUM SERPL-SCNC: 3.99 MMOL/L (ref 3.5–5)
POTASSIUM SERPL-SCNC: 4 MMOL/L (ref 3.5–5.5)
POTASSIUM SERPL-SCNC: 4.1 MMOL/L (ref 3.5–5)
POTASSIUM SERPL-SCNC: 4.6 MMOL/L (ref 3.5–5.5)
POTASSIUM SERPL-SCNC: 5 MMOL/L (ref 3.5–5.5)
POTASSIUM SERPL-SCNC: 5.5 MMOL/L (ref 3.5–5.5)
PROTHROMBIN TIME: 14.3 SEC (ref 9.3–12.4)
PS: 10 CMH20
RBC # BLD: 3.22 E12/L (ref 3.5–5.5)
RI(T): 0.84
RI(T): 1.89
RR MECHANICAL: 12 B/MIN
SODIUM BLD-SCNC: 137 MMOL/L (ref 132–146)
SOURCE, BLOOD GAS: ABNORMAL
SOURCE, BLOOD GAS: ABNORMAL
THB: 10.8 G/DL (ref 11.5–16.5)
THB: 11.1 G/DL (ref 11.5–16.5)
TIME ANALYZED: 1348
TIME ANALYZED: 1625
VT MECHANICAL: 450 ML
WBC # BLD: 9.9 E9/L (ref 4.5–11.5)

## 2023-01-03 PROCEDURE — 6370000000 HC RX 637 (ALT 250 FOR IP): Performed by: INTERNAL MEDICINE

## 2023-01-03 PROCEDURE — 02L70CK OCCLUSION OF LEFT ATRIAL APPENDAGE WITH EXTRALUMINAL DEVICE, OPEN APPROACH: ICD-10-PCS | Performed by: THORACIC SURGERY (CARDIOTHORACIC VASCULAR SURGERY)

## 2023-01-03 PROCEDURE — 2000000000 HC ICU R&B

## 2023-01-03 PROCEDURE — 6370000000 HC RX 637 (ALT 250 FOR IP)

## 2023-01-03 PROCEDURE — 94640 AIRWAY INHALATION TREATMENT: CPT

## 2023-01-03 PROCEDURE — A4648 IMPLANTABLE TISSUE MARKER: HCPCS | Performed by: THORACIC SURGERY (CARDIOTHORACIC VASCULAR SURGERY)

## 2023-01-03 PROCEDURE — 85347 COAGULATION TIME ACTIVATED: CPT

## 2023-01-03 PROCEDURE — 6370000000 HC RX 637 (ALT 250 FOR IP): Performed by: NURSE PRACTITIONER

## 2023-01-03 PROCEDURE — 85610 PROTHROMBIN TIME: CPT

## 2023-01-03 PROCEDURE — C9113 INJ PANTOPRAZOLE SODIUM, VIA: HCPCS | Performed by: PHYSICIAN ASSISTANT

## 2023-01-03 PROCEDURE — 2709999900 HC NON-CHARGEABLE SUPPLY: Performed by: THORACIC SURGERY (CARDIOTHORACIC VASCULAR SURGERY)

## 2023-01-03 PROCEDURE — 94664 DEMO&/EVAL PT USE INHALER: CPT

## 2023-01-03 PROCEDURE — 02110AW BYPASS CORONARY ARTERY, TWO ARTERIES FROM AORTA WITH AUTOLOGOUS ARTERIAL TISSUE, OPEN APPROACH: ICD-10-PCS | Performed by: THORACIC SURGERY (CARDIOTHORACIC VASCULAR SURGERY)

## 2023-01-03 PROCEDURE — 82330 ASSAY OF CALCIUM: CPT

## 2023-01-03 PROCEDURE — 3700000000 HC ANESTHESIA ATTENDED CARE: Performed by: THORACIC SURGERY (CARDIOTHORACIC VASCULAR SURGERY)

## 2023-01-03 PROCEDURE — 6360000002 HC RX W HCPCS

## 2023-01-03 PROCEDURE — A4216 STERILE WATER/SALINE, 10 ML: HCPCS | Performed by: PHYSICIAN ASSISTANT

## 2023-01-03 PROCEDURE — 2580000003 HC RX 258

## 2023-01-03 PROCEDURE — 84132 ASSAY OF SERUM POTASSIUM: CPT

## 2023-01-03 PROCEDURE — 02100Z9 BYPASS CORONARY ARTERY, ONE ARTERY FROM LEFT INTERNAL MAMMARY, OPEN APPROACH: ICD-10-PCS | Performed by: THORACIC SURGERY (CARDIOTHORACIC VASCULAR SURGERY)

## 2023-01-03 PROCEDURE — 0PH000Z INSERTION OF RIGID PLATE INTERNAL FIXATION DEVICE INTO STERNUM, OPEN APPROACH: ICD-10-PCS | Performed by: THORACIC SURGERY (CARDIOTHORACIC VASCULAR SURGERY)

## 2023-01-03 PROCEDURE — 33268 EXCL LAA OPN OTH PX ANY METH: CPT | Performed by: THORACIC SURGERY (CARDIOTHORACIC VASCULAR SURGERY)

## 2023-01-03 PROCEDURE — APPSS30 APP SPLIT SHARED TIME 16-30 MINUTES: Performed by: CLINICAL NURSE SPECIALIST

## 2023-01-03 PROCEDURE — C1762 CONN TISS, HUMAN(INC FASCIA): HCPCS | Performed by: THORACIC SURGERY (CARDIOTHORACIC VASCULAR SURGERY)

## 2023-01-03 PROCEDURE — 6370000000 HC RX 637 (ALT 250 FOR IP): Performed by: PHYSICIAN ASSISTANT

## 2023-01-03 PROCEDURE — 6360000002 HC RX W HCPCS: Performed by: NURSE PRACTITIONER

## 2023-01-03 PROCEDURE — 3600000008 HC SURGERY OHS BASE: Performed by: THORACIC SURGERY (CARDIOTHORACIC VASCULAR SURGERY)

## 2023-01-03 PROCEDURE — 5A1221Z PERFORMANCE OF CARDIAC OUTPUT, CONTINUOUS: ICD-10-PCS | Performed by: THORACIC SURGERY (CARDIOTHORACIC VASCULAR SURGERY)

## 2023-01-03 PROCEDURE — 2500000003 HC RX 250 WO HCPCS

## 2023-01-03 PROCEDURE — 2580000003 HC RX 258: Performed by: INTERNAL MEDICINE

## 2023-01-03 PROCEDURE — 5A1935Z RESPIRATORY VENTILATION, LESS THAN 24 CONSECUTIVE HOURS: ICD-10-PCS | Performed by: THORACIC SURGERY (CARDIOTHORACIC VASCULAR SURGERY)

## 2023-01-03 PROCEDURE — 2580000003 HC RX 258: Performed by: PHYSICIAN ASSISTANT

## 2023-01-03 PROCEDURE — C1713 ANCHOR/SCREW BN/BN,TIS/BN: HCPCS | Performed by: THORACIC SURGERY (CARDIOTHORACIC VASCULAR SURGERY)

## 2023-01-03 PROCEDURE — 2580000003 HC RX 258: Performed by: THORACIC SURGERY (CARDIOTHORACIC VASCULAR SURGERY)

## 2023-01-03 PROCEDURE — C1729 CATH, DRAINAGE: HCPCS | Performed by: THORACIC SURGERY (CARDIOTHORACIC VASCULAR SURGERY)

## 2023-01-03 PROCEDURE — 6360000002 HC RX W HCPCS: Performed by: PHYSICIAN ASSISTANT

## 2023-01-03 PROCEDURE — P9041 ALBUMIN (HUMAN),5%, 50ML: HCPCS

## 2023-01-03 PROCEDURE — 80048 BASIC METABOLIC PNL TOTAL CA: CPT

## 2023-01-03 PROCEDURE — 33533 CABG ARTERIAL SINGLE: CPT | Performed by: THORACIC SURGERY (CARDIOTHORACIC VASCULAR SURGERY)

## 2023-01-03 PROCEDURE — 33268 EXCL LAA OPN OTH PX ANY METH: CPT

## 2023-01-03 PROCEDURE — 71045 X-RAY EXAM CHEST 1 VIEW: CPT

## 2023-01-03 PROCEDURE — 7100000000 HC PACU RECOVERY - FIRST 15 MIN

## 2023-01-03 PROCEDURE — P9045 ALBUMIN (HUMAN), 5%, 250 ML: HCPCS | Performed by: PHYSICIAN ASSISTANT

## 2023-01-03 PROCEDURE — 05HM33Z INSERTION OF INFUSION DEVICE INTO RIGHT INTERNAL JUGULAR VEIN, PERCUTANEOUS APPROACH: ICD-10-PCS | Performed by: ANESTHESIOLOGY

## 2023-01-03 PROCEDURE — 83735 ASSAY OF MAGNESIUM: CPT

## 2023-01-03 PROCEDURE — 33533 CABG ARTERIAL SINGLE: CPT

## 2023-01-03 PROCEDURE — 82805 BLOOD GASES W/O2 SATURATION: CPT

## 2023-01-03 PROCEDURE — 2580000003 HC RX 258: Performed by: NURSE PRACTITIONER

## 2023-01-03 PROCEDURE — 0BH17EZ INSERTION OF ENDOTRACHEAL AIRWAY INTO TRACHEA, VIA NATURAL OR ARTIFICIAL OPENING: ICD-10-PCS | Performed by: THORACIC SURGERY (CARDIOTHORACIC VASCULAR SURGERY)

## 2023-01-03 PROCEDURE — 7100000001 HC PACU RECOVERY - ADDTL 15 MIN

## 2023-01-03 PROCEDURE — 94002 VENT MGMT INPAT INIT DAY: CPT

## 2023-01-03 PROCEDURE — 2700000000 HC OXYGEN THERAPY PER DAY

## 2023-01-03 PROCEDURE — 82803 BLOOD GASES ANY COMBINATION: CPT

## 2023-01-03 PROCEDURE — 33518 CABG ARTERY-VEIN TWO: CPT | Performed by: THORACIC SURGERY (CARDIOTHORACIC VASCULAR SURGERY)

## 2023-01-03 PROCEDURE — 3700000001 HC ADD 15 MINUTES (ANESTHESIA): Performed by: THORACIC SURGERY (CARDIOTHORACIC VASCULAR SURGERY)

## 2023-01-03 PROCEDURE — 99232 SBSQ HOSP IP/OBS MODERATE 35: CPT | Performed by: INTERNAL MEDICINE

## 2023-01-03 PROCEDURE — 3600000018 HC SURGERY OHS ADDTL 15MIN: Performed by: THORACIC SURGERY (CARDIOTHORACIC VASCULAR SURGERY)

## 2023-01-03 PROCEDURE — A4217 STERILE WATER/SALINE, 500 ML: HCPCS | Performed by: THORACIC SURGERY (CARDIOTHORACIC VASCULAR SURGERY)

## 2023-01-03 PROCEDURE — 36415 COLL VENOUS BLD VENIPUNCTURE: CPT

## 2023-01-03 PROCEDURE — 6360000002 HC RX W HCPCS: Performed by: THORACIC SURGERY (CARDIOTHORACIC VASCULAR SURGERY)

## 2023-01-03 PROCEDURE — 85730 THROMBOPLASTIN TIME PARTIAL: CPT

## 2023-01-03 PROCEDURE — 37799 UNLISTED PX VASCULAR SURGERY: CPT

## 2023-01-03 PROCEDURE — 2720000010 HC SURG SUPPLY STERILE: Performed by: THORACIC SURGERY (CARDIOTHORACIC VASCULAR SURGERY)

## 2023-01-03 PROCEDURE — 99024 POSTOP FOLLOW-UP VISIT: CPT | Performed by: NURSE PRACTITIONER

## 2023-01-03 PROCEDURE — 85027 COMPLETE CBC AUTOMATED: CPT

## 2023-01-03 PROCEDURE — 82962 GLUCOSE BLOOD TEST: CPT

## 2023-01-03 PROCEDURE — 33518 CABG ARTERY-VEIN TWO: CPT

## 2023-01-03 DEVICE — IMPLANT HUM TISS L20-80CM DIA3-6MM SAPH VEIN CRYOPRESERVED: Type: IMPLANTABLE DEVICE | Site: HEART | Status: FUNCTIONAL

## 2023-01-03 DEVICE — LOCKING SCREW,AXS,SELF-DRILLING
Type: IMPLANTABLE DEVICE | Site: STERNUM | Status: FUNCTIONAL
Brand: AXS, SMARTLOCK

## 2023-01-03 DEVICE — STERNALPLATE, LADDER, NARROW: Type: IMPLANTABLE DEVICE | Site: STERNUM | Status: FUNCTIONAL

## 2023-01-03 DEVICE — DEVICE OCCL CLP L35MM PLUNG GRP FLX SHFT FOR GILLINOV: Type: IMPLANTABLE DEVICE | Site: HEART | Status: FUNCTIONAL

## 2023-01-03 DEVICE — STERNALPLATE, HEX, NARROW: Type: IMPLANTABLE DEVICE | Site: STERNUM | Status: FUNCTIONAL

## 2023-01-03 RX ORDER — FENTANYL CITRATE 50 UG/ML
50 INJECTION, SOLUTION INTRAMUSCULAR; INTRAVENOUS
Status: DISCONTINUED | OUTPATIENT
Start: 2023-01-03 | End: 2023-01-05

## 2023-01-03 RX ORDER — SODIUM CHLORIDE, SODIUM LACTATE, POTASSIUM CHLORIDE, CALCIUM CHLORIDE 600; 310; 30; 20 MG/100ML; MG/100ML; MG/100ML; MG/100ML
INJECTION, SOLUTION INTRAVENOUS CONTINUOUS PRN
Status: DISCONTINUED | OUTPATIENT
Start: 2023-01-03 | End: 2023-01-03 | Stop reason: SDUPTHER

## 2023-01-03 RX ORDER — MAGNESIUM SULFATE IN WATER 40 MG/ML
2000 INJECTION, SOLUTION INTRAVENOUS PRN
Status: DISCONTINUED | OUTPATIENT
Start: 2023-01-03 | End: 2023-01-05

## 2023-01-03 RX ORDER — ONDANSETRON 4 MG/1
4 TABLET, ORALLY DISINTEGRATING ORAL EVERY 8 HOURS PRN
Status: DISCONTINUED | OUTPATIENT
Start: 2023-01-03 | End: 2023-01-05

## 2023-01-03 RX ORDER — IPRATROPIUM BROMIDE AND ALBUTEROL SULFATE 2.5; .5 MG/3ML; MG/3ML
1 SOLUTION RESPIRATORY (INHALATION)
Status: DISCONTINUED | OUTPATIENT
Start: 2023-01-03 | End: 2023-01-09 | Stop reason: HOSPADM

## 2023-01-03 RX ORDER — ACETAMINOPHEN 325 MG/1
650 TABLET ORAL EVERY 4 HOURS PRN
Status: DISCONTINUED | OUTPATIENT
Start: 2023-01-03 | End: 2023-01-05

## 2023-01-03 RX ORDER — PANTOPRAZOLE SODIUM 40 MG/1
40 TABLET, DELAYED RELEASE ORAL DAILY
Status: DISCONTINUED | OUTPATIENT
Start: 2023-01-04 | End: 2023-01-05

## 2023-01-03 RX ORDER — TAMSULOSIN HYDROCHLORIDE 0.4 MG/1
0.4 CAPSULE ORAL DAILY
Status: DISCONTINUED | OUTPATIENT
Start: 2023-01-04 | End: 2023-01-05

## 2023-01-03 RX ORDER — FENTANYL CITRATE 50 UG/ML
25 INJECTION, SOLUTION INTRAMUSCULAR; INTRAVENOUS
Status: DISCONTINUED | OUTPATIENT
Start: 2023-01-03 | End: 2023-01-05

## 2023-01-03 RX ORDER — SODIUM CHLORIDE 9 MG/ML
INJECTION, SOLUTION INTRAVENOUS PRN
Status: DISCONTINUED | OUTPATIENT
Start: 2023-01-03 | End: 2023-01-05

## 2023-01-03 RX ORDER — 0.9 % SODIUM CHLORIDE 0.9 %
250 INTRAVENOUS SOLUTION INTRAVENOUS CONTINUOUS PRN
Status: DISCONTINUED | OUTPATIENT
Start: 2023-01-03 | End: 2023-01-05

## 2023-01-03 RX ORDER — ETOMIDATE 2 MG/ML
INJECTION INTRAVENOUS PRN
Status: DISCONTINUED | OUTPATIENT
Start: 2023-01-03 | End: 2023-01-03 | Stop reason: SDUPTHER

## 2023-01-03 RX ORDER — POTASSIUM CHLORIDE 29.8 MG/ML
20 INJECTION INTRAVENOUS PRN
Status: DISCONTINUED | OUTPATIENT
Start: 2023-01-03 | End: 2023-01-05

## 2023-01-03 RX ORDER — ONDANSETRON 2 MG/ML
4 INJECTION INTRAMUSCULAR; INTRAVENOUS EVERY 6 HOURS PRN
Status: DISCONTINUED | OUTPATIENT
Start: 2023-01-03 | End: 2023-01-05

## 2023-01-03 RX ORDER — PROPOFOL 10 MG/ML
INJECTION, EMULSION INTRAVENOUS
Status: DISPENSED
Start: 2023-01-03 | End: 2023-01-03

## 2023-01-03 RX ORDER — CHLORHEXIDINE GLUCONATE 0.12 MG/ML
15 RINSE ORAL 2 TIMES DAILY
Status: DISCONTINUED | OUTPATIENT
Start: 2023-01-03 | End: 2023-01-05

## 2023-01-03 RX ORDER — ACETAMINOPHEN 325 MG/1
650 TABLET ORAL EVERY 4 HOURS PRN
Status: DISCONTINUED | OUTPATIENT
Start: 2023-01-03 | End: 2023-01-03 | Stop reason: SDUPTHER

## 2023-01-03 RX ORDER — AMINOCAPROIC ACID 250 MG/ML
INJECTION, SOLUTION INTRAVENOUS PRN
Status: DISCONTINUED | OUTPATIENT
Start: 2023-01-03 | End: 2023-01-03 | Stop reason: SDUPTHER

## 2023-01-03 RX ORDER — PROPOFOL 10 MG/ML
INJECTION, EMULSION INTRAVENOUS PRN
Status: DISCONTINUED | OUTPATIENT
Start: 2023-01-03 | End: 2023-01-03 | Stop reason: SDUPTHER

## 2023-01-03 RX ORDER — BISACODYL 10 MG
10 SUPPOSITORY, RECTAL RECTAL DAILY PRN
Status: DISCONTINUED | OUTPATIENT
Start: 2023-01-04 | End: 2023-01-05

## 2023-01-03 RX ORDER — OXYCODONE HYDROCHLORIDE 5 MG/1
5 TABLET ORAL EVERY 4 HOURS PRN
Status: DISCONTINUED | OUTPATIENT
Start: 2023-01-03 | End: 2023-01-05

## 2023-01-03 RX ORDER — MIDAZOLAM HYDROCHLORIDE 1 MG/ML
INJECTION INTRAMUSCULAR; INTRAVENOUS PRN
Status: DISCONTINUED | OUTPATIENT
Start: 2023-01-03 | End: 2023-01-03 | Stop reason: SDUPTHER

## 2023-01-03 RX ORDER — INSULIN GLARGINE-YFGN 100 [IU]/ML
0.15 INJECTION, SOLUTION SUBCUTANEOUS NIGHTLY
Status: DISCONTINUED | OUTPATIENT
Start: 2023-01-04 | End: 2023-01-03

## 2023-01-03 RX ORDER — PROPOFOL 10 MG/ML
10 INJECTION, EMULSION INTRAVENOUS CONTINUOUS PRN
Status: DISCONTINUED | OUTPATIENT
Start: 2023-01-03 | End: 2023-01-05

## 2023-01-03 RX ORDER — ALBUMIN, HUMAN INJ 5% 5 %
25 SOLUTION INTRAVENOUS PRN
Status: DISCONTINUED | OUTPATIENT
Start: 2023-01-03 | End: 2023-01-05

## 2023-01-03 RX ORDER — SODIUM CHLORIDE 0.9 % (FLUSH) 0.9 %
5-40 SYRINGE (ML) INJECTION PRN
Status: DISCONTINUED | OUTPATIENT
Start: 2023-01-03 | End: 2023-01-09 | Stop reason: HOSPADM

## 2023-01-03 RX ORDER — ONDANSETRON 2 MG/ML
INJECTION INTRAMUSCULAR; INTRAVENOUS PRN
Status: DISCONTINUED | OUTPATIENT
Start: 2023-01-03 | End: 2023-01-03 | Stop reason: SDUPTHER

## 2023-01-03 RX ORDER — CLOPIDOGREL BISULFATE 75 MG/1
75 TABLET ORAL DAILY
Status: DISCONTINUED | OUTPATIENT
Start: 2023-01-04 | End: 2023-01-08

## 2023-01-03 RX ORDER — MEPERIDINE HYDROCHLORIDE 25 MG/ML
25 INJECTION INTRAMUSCULAR; INTRAVENOUS; SUBCUTANEOUS
Status: COMPLETED | OUTPATIENT
Start: 2023-01-03 | End: 2023-01-03

## 2023-01-03 RX ORDER — OXYCODONE HYDROCHLORIDE 10 MG/1
10 TABLET ORAL EVERY 4 HOURS PRN
Status: DISCONTINUED | OUTPATIENT
Start: 2023-01-03 | End: 2023-01-05

## 2023-01-03 RX ORDER — SENNA AND DOCUSATE SODIUM 50; 8.6 MG/1; MG/1
1 TABLET, FILM COATED ORAL 2 TIMES DAILY
Status: DISCONTINUED | OUTPATIENT
Start: 2023-01-04 | End: 2023-01-05

## 2023-01-03 RX ORDER — HEPARIN SODIUM 10000 [USP'U]/ML
INJECTION, SOLUTION INTRAVENOUS; SUBCUTANEOUS PRN
Status: DISCONTINUED | OUTPATIENT
Start: 2023-01-03 | End: 2023-01-03 | Stop reason: SDUPTHER

## 2023-01-03 RX ORDER — INSULIN LISPRO 100 [IU]/ML
0-4 INJECTION, SOLUTION INTRAVENOUS; SUBCUTANEOUS EVERY 4 HOURS
Status: DISCONTINUED | OUTPATIENT
Start: 2023-01-03 | End: 2023-01-05

## 2023-01-03 RX ORDER — DEXTROSE MONOHYDRATE 100 MG/ML
INJECTION, SOLUTION INTRAVENOUS CONTINUOUS PRN
Status: DISCONTINUED | OUTPATIENT
Start: 2023-01-03 | End: 2023-01-09 | Stop reason: HOSPADM

## 2023-01-03 RX ORDER — PHENYLEPHRINE HCL IN 0.9% NACL 1 MG/10 ML
SYRINGE (ML) INTRAVENOUS PRN
Status: DISCONTINUED | OUTPATIENT
Start: 2023-01-03 | End: 2023-01-03 | Stop reason: SDUPTHER

## 2023-01-03 RX ORDER — ACETAMINOPHEN 650 MG/1
650 SUPPOSITORY RECTAL EVERY 4 HOURS PRN
Status: DISCONTINUED | OUTPATIENT
Start: 2023-01-03 | End: 2023-01-05

## 2023-01-03 RX ORDER — SODIUM CHLORIDE 9 MG/ML
30 INJECTION, SOLUTION INTRAVENOUS CONTINUOUS
Status: DISCONTINUED | OUTPATIENT
Start: 2023-01-03 | End: 2023-01-05

## 2023-01-03 RX ORDER — SODIUM CHLORIDE 9 MG/ML
INJECTION, SOLUTION INTRAVENOUS CONTINUOUS PRN
Status: DISCONTINUED | OUTPATIENT
Start: 2023-01-03 | End: 2023-01-03 | Stop reason: SDUPTHER

## 2023-01-03 RX ORDER — ALBUMIN, HUMAN INJ 5% 5 %
SOLUTION INTRAVENOUS
Status: COMPLETED
Start: 2023-01-03 | End: 2023-01-03

## 2023-01-03 RX ORDER — DEXAMETHASONE SODIUM PHOSPHATE 10 MG/ML
INJECTION INTRAMUSCULAR; INTRAVENOUS PRN
Status: DISCONTINUED | OUTPATIENT
Start: 2023-01-03 | End: 2023-01-03 | Stop reason: SDUPTHER

## 2023-01-03 RX ORDER — ALBUMIN, HUMAN INJ 5% 5 %
SOLUTION INTRAVENOUS PRN
Status: DISCONTINUED | OUTPATIENT
Start: 2023-01-03 | End: 2023-01-03 | Stop reason: SDUPTHER

## 2023-01-03 RX ORDER — ASPIRIN 81 MG/1
81 TABLET, CHEWABLE ORAL ONCE
Status: COMPLETED | OUTPATIENT
Start: 2023-01-03 | End: 2023-01-03

## 2023-01-03 RX ORDER — ACETAMINOPHEN 650 MG/1
650 SUPPOSITORY RECTAL EVERY 4 HOURS PRN
Status: DISCONTINUED | OUTPATIENT
Start: 2023-01-03 | End: 2023-01-03 | Stop reason: SDUPTHER

## 2023-01-03 RX ORDER — LANOLIN ALCOHOL/MO/W.PET/CERES
400 CREAM (GRAM) TOPICAL DAILY
Status: DISCONTINUED | OUTPATIENT
Start: 2023-01-04 | End: 2023-01-09 | Stop reason: HOSPADM

## 2023-01-03 RX ORDER — FENTANYL CITRATE 0.05 MG/ML
INJECTION, SOLUTION INTRAMUSCULAR; INTRAVENOUS PRN
Status: DISCONTINUED | OUTPATIENT
Start: 2023-01-03 | End: 2023-01-03 | Stop reason: SDUPTHER

## 2023-01-03 RX ORDER — SODIUM CHLORIDE 0.9 % (FLUSH) 0.9 %
5-40 SYRINGE (ML) INJECTION EVERY 12 HOURS SCHEDULED
Status: DISCONTINUED | OUTPATIENT
Start: 2023-01-03 | End: 2023-01-09 | Stop reason: HOSPADM

## 2023-01-03 RX ORDER — AMIODARONE HYDROCHLORIDE 200 MG/1
400 TABLET ORAL PRN
Status: DISCONTINUED | OUTPATIENT
Start: 2023-01-03 | End: 2023-01-05

## 2023-01-03 RX ORDER — VECURONIUM BROMIDE 1 MG/ML
INJECTION, POWDER, LYOPHILIZED, FOR SOLUTION INTRAVENOUS PRN
Status: DISCONTINUED | OUTPATIENT
Start: 2023-01-03 | End: 2023-01-03 | Stop reason: SDUPTHER

## 2023-01-03 RX ORDER — PROTAMINE SULFATE 10 MG/ML
INJECTION, SOLUTION INTRAVENOUS PRN
Status: DISCONTINUED | OUTPATIENT
Start: 2023-01-03 | End: 2023-01-03 | Stop reason: SDUPTHER

## 2023-01-03 RX ORDER — ASPIRIN 81 MG/1
81 TABLET ORAL DAILY
Status: DISCONTINUED | OUTPATIENT
Start: 2023-01-04 | End: 2023-01-05

## 2023-01-03 RX ADMIN — EPINEPHRINE 1.5 MCG/MIN: 1 INJECTION INTRAMUSCULAR; INTRAVENOUS; SUBCUTANEOUS at 14:07

## 2023-01-03 RX ADMIN — DEXAMETHASONE SODIUM PHOSPHATE 10 MG: 10 INJECTION INTRAMUSCULAR; INTRAVENOUS at 12:33

## 2023-01-03 RX ADMIN — PROPOFOL 10 MCG/KG/MIN: 10 INJECTION, EMULSION INTRAVENOUS at 13:40

## 2023-01-03 RX ADMIN — Medication 100 MCG: at 10:05

## 2023-01-03 RX ADMIN — Medication 100 MCG: at 12:41

## 2023-01-03 RX ADMIN — AMINOCAPROIC ACID 5000 MG: 250 INJECTION, SOLUTION INTRAVENOUS at 10:20

## 2023-01-03 RX ADMIN — SPIRONOLACTONE 25 MG: 25 TABLET ORAL at 08:14

## 2023-01-03 RX ADMIN — METOPROLOL SUCCINATE 25 MG: 25 TABLET, EXTENDED RELEASE ORAL at 08:14

## 2023-01-03 RX ADMIN — SODIUM CHLORIDE: 9 INJECTION, SOLUTION INTRAVENOUS at 09:50

## 2023-01-03 RX ADMIN — SODIUM CHLORIDE 3 UNITS/HR: 9 INJECTION, SOLUTION INTRAVENOUS at 12:16

## 2023-01-03 RX ADMIN — SODIUM CHLORIDE, POTASSIUM CHLORIDE, SODIUM LACTATE AND CALCIUM CHLORIDE: 600; 310; 30; 20 INJECTION, SOLUTION INTRAVENOUS at 12:27

## 2023-01-03 RX ADMIN — INSULIN LISPRO 4 UNITS: 100 INJECTION, SOLUTION INTRAVENOUS; SUBCUTANEOUS at 18:01

## 2023-01-03 RX ADMIN — Medication 100 MCG: at 11:17

## 2023-01-03 RX ADMIN — SODIUM CHLORIDE, PRESERVATIVE FREE 10 ML: 5 INJECTION INTRAVENOUS at 08:14

## 2023-01-03 RX ADMIN — FENTANYL CITRATE 50 MCG: 50 INJECTION, SOLUTION INTRAMUSCULAR; INTRAVENOUS at 13:00

## 2023-01-03 RX ADMIN — SODIUM CHLORIDE: 9 INJECTION, SOLUTION INTRAVENOUS at 12:58

## 2023-01-03 RX ADMIN — GUAIFENESIN 400 MG: 400 TABLET ORAL at 08:14

## 2023-01-03 RX ADMIN — ASPIRIN 81 MG CHEWABLE TABLET 81 MG: 81 TABLET CHEWABLE at 08:14

## 2023-01-03 RX ADMIN — SODIUM CHLORIDE 40 MG: 9 INJECTION, SOLUTION INTRAMUSCULAR; INTRAVENOUS; SUBCUTANEOUS at 14:29

## 2023-01-03 RX ADMIN — CEFAZOLIN 2000 MG: 2 INJECTION, POWDER, FOR SOLUTION INTRAMUSCULAR; INTRAVENOUS at 19:00

## 2023-01-03 RX ADMIN — FENTANYL CITRATE 100 MCG: 50 INJECTION, SOLUTION INTRAMUSCULAR; INTRAVENOUS at 13:28

## 2023-01-03 RX ADMIN — 0.12% CHLORHEXIDINE GLUCONATE 15 ML: 1.2 RINSE ORAL at 07:53

## 2023-01-03 RX ADMIN — HEPARIN SODIUM 25000 UNITS: 10000 INJECTION, SOLUTION INTRAVENOUS; SUBCUTANEOUS at 11:09

## 2023-01-03 RX ADMIN — INSULIN LISPRO 4 UNITS: 100 INJECTION, SOLUTION INTRAVENOUS; SUBCUTANEOUS at 22:05

## 2023-01-03 RX ADMIN — OXYCODONE 5 MG: 5 TABLET ORAL at 22:00

## 2023-01-03 RX ADMIN — FENTANYL CITRATE 100 MCG: 50 INJECTION, SOLUTION INTRAMUSCULAR; INTRAVENOUS at 10:35

## 2023-01-03 RX ADMIN — MUPIROCIN: 20 OINTMENT TOPICAL at 22:00

## 2023-01-03 RX ADMIN — FENTANYL CITRATE 150 MCG: 50 INJECTION, SOLUTION INTRAMUSCULAR; INTRAVENOUS at 10:02

## 2023-01-03 RX ADMIN — SODIUM CHLORIDE 30 ML/HR: 9 INJECTION, SOLUTION INTRAVENOUS at 14:28

## 2023-01-03 RX ADMIN — MIDAZOLAM 2 MG: 1 INJECTION INTRAMUSCULAR; INTRAVENOUS at 09:45

## 2023-01-03 RX ADMIN — FENTANYL CITRATE 100 MCG: 50 INJECTION, SOLUTION INTRAMUSCULAR; INTRAVENOUS at 10:20

## 2023-01-03 RX ADMIN — VECURONIUM BROMIDE 10 MG: 1 INJECTION, POWDER, LYOPHILIZED, FOR SOLUTION INTRAVENOUS at 10:02

## 2023-01-03 RX ADMIN — SODIUM CHLORIDE, POTASSIUM CHLORIDE, SODIUM LACTATE AND CALCIUM CHLORIDE: 600; 310; 30; 20 INJECTION, SOLUTION INTRAVENOUS at 09:50

## 2023-01-03 RX ADMIN — MUPIROCIN: 20 OINTMENT TOPICAL at 07:53

## 2023-01-03 RX ADMIN — VECURONIUM BROMIDE 5 MG: 1 INJECTION, POWDER, LYOPHILIZED, FOR SOLUTION INTRAVENOUS at 10:44

## 2023-01-03 RX ADMIN — ASPIRIN 81 MG 81 MG: 81 TABLET ORAL at 17:53

## 2023-01-03 RX ADMIN — EPINEPHRINE 0.02 MCG/KG/MIN: 1 INJECTION PARENTERAL at 12:27

## 2023-01-03 RX ADMIN — ACETAMINOPHEN 650 MG: 325 TABLET ORAL at 23:45

## 2023-01-03 RX ADMIN — SODIUM CHLORIDE, PRESERVATIVE FREE 10 ML: 5 INJECTION INTRAVENOUS at 22:00

## 2023-01-03 RX ADMIN — HEPARIN SODIUM 25000 UNITS: 10000 INJECTION, SOLUTION INTRAVENOUS; SUBCUTANEOUS at 10:59

## 2023-01-03 RX ADMIN — AMLODIPINE BESYLATE 5 MG: 5 TABLET ORAL at 08:14

## 2023-01-03 RX ADMIN — Medication 100 MCG: at 12:43

## 2023-01-03 RX ADMIN — MIDAZOLAM 2 MG: 1 INJECTION INTRAMUSCULAR; INTRAVENOUS at 09:40

## 2023-01-03 RX ADMIN — ALBUMIN (HUMAN) 25 G: 12.5 INJECTION, SOLUTION INTRAVENOUS at 12:42

## 2023-01-03 RX ADMIN — ALBUMIN (HUMAN) 25 G: 2.5 SOLUTION INTRAVENOUS at 16:21

## 2023-01-03 RX ADMIN — IPRATROPIUM BROMIDE AND ALBUTEROL SULFATE 1 AMPULE: .5; 2.5 SOLUTION RESPIRATORY (INHALATION) at 16:36

## 2023-01-03 RX ADMIN — CEFAZOLIN 2000 MG: 2 INJECTION, POWDER, FOR SOLUTION INTRAMUSCULAR; INTRAVENOUS at 10:25

## 2023-01-03 RX ADMIN — SODIUM CHLORIDE, SODIUM LACTATE, POTASSIUM CHLORIDE, CALCIUM CHLORIDE: 600; 310; 30; 20 INJECTION, SOLUTION INTRAVENOUS at 10:02

## 2023-01-03 RX ADMIN — PROTAMINE SULFATE 200 MG: 10 INJECTION, SOLUTION INTRAVENOUS at 12:24

## 2023-01-03 RX ADMIN — IPRATROPIUM BROMIDE AND ALBUTEROL SULFATE 1 AMPULE: .5; 2.5 SOLUTION RESPIRATORY (INHALATION) at 20:01

## 2023-01-03 RX ADMIN — AMINOCAPROIC ACID 1 G/HR: 250 INJECTION, SOLUTION INTRAVENOUS at 10:29

## 2023-01-03 RX ADMIN — Medication 50 MCG: at 10:31

## 2023-01-03 RX ADMIN — INSULIN LISPRO 4 UNITS: 100 INJECTION, SOLUTION INTRAVENOUS; SUBCUTANEOUS at 13:50

## 2023-01-03 RX ADMIN — FENTANYL CITRATE 100 MCG: 50 INJECTION, SOLUTION INTRAMUSCULAR; INTRAVENOUS at 10:29

## 2023-01-03 RX ADMIN — PROPOFOL 20 MG: 10 INJECTION, EMULSION INTRAVENOUS at 10:43

## 2023-01-03 RX ADMIN — FENTANYL CITRATE 100 MCG: 50 INJECTION, SOLUTION INTRAMUSCULAR; INTRAVENOUS at 11:00

## 2023-01-03 RX ADMIN — MEPERIDINE HYDROCHLORIDE 25 MG: 25 INJECTION, SOLUTION INTRAMUSCULAR; INTRAVENOUS; SUBCUTANEOUS at 15:55

## 2023-01-03 RX ADMIN — MUPIROCIN: 20 OINTMENT TOPICAL at 18:05

## 2023-01-03 RX ADMIN — FENTANYL CITRATE 100 MCG: 50 INJECTION, SOLUTION INTRAMUSCULAR; INTRAVENOUS at 11:20

## 2023-01-03 RX ADMIN — ALBUMIN (HUMAN) 25 G: 2.5 SOLUTION INTRAVENOUS at 14:38

## 2023-01-03 RX ADMIN — FENTANYL CITRATE 100 MCG: 50 INJECTION, SOLUTION INTRAMUSCULAR; INTRAVENOUS at 10:43

## 2023-01-03 RX ADMIN — ONDANSETRON 4 MG: 2 INJECTION INTRAMUSCULAR; INTRAVENOUS at 12:53

## 2023-01-03 RX ADMIN — FENTANYL CITRATE 25 MCG: 50 INJECTION, SOLUTION INTRAMUSCULAR; INTRAVENOUS at 17:53

## 2023-01-03 RX ADMIN — VECURONIUM BROMIDE 5 MG: 1 INJECTION, POWDER, LYOPHILIZED, FOR SOLUTION INTRAVENOUS at 11:20

## 2023-01-03 RX ADMIN — ETOMIDATE INJECTION 10 MG: 2 SOLUTION INTRAVENOUS at 10:02

## 2023-01-03 RX ADMIN — INSULIN HUMAN 3 UNITS: 100 INJECTION, SOLUTION PARENTERAL at 12:16

## 2023-01-03 RX ADMIN — FENTANYL CITRATE 100 MCG: 50 INJECTION, SOLUTION INTRAMUSCULAR; INTRAVENOUS at 10:11

## 2023-01-03 RX ADMIN — Medication 100 MCG: at 10:02

## 2023-01-03 ASSESSMENT — PULMONARY FUNCTION TESTS
PIF_VALUE: 24
PIF_VALUE: 24
PIF_VALUE: 15
PIF_VALUE: 15
PIF_VALUE: 24
PIF_VALUE: 17
PIF_VALUE: 24
PIF_VALUE: 14
PIF_VALUE: 14
PIF_VALUE: 25
PIF_VALUE: 14
PIF_VALUE: 16
PIF_VALUE: 17
PIF_VALUE: 24
PIF_VALUE: 25
PIF_VALUE: 24
PIF_VALUE: 26
PIF_VALUE: 14
PIF_VALUE: 25
PIF_VALUE: 12
PIF_VALUE: 24
PIF_VALUE: 15
PIF_VALUE: 10
PIF_VALUE: 15
PIF_VALUE: 15

## 2023-01-03 ASSESSMENT — PAIN SCALES - GENERAL
PAINLEVEL_OUTOF10: 0
PAINLEVEL_OUTOF10: 4
PAINLEVEL_OUTOF10: 2
PAINLEVEL_OUTOF10: 0
PAINLEVEL_OUTOF10: 6
PAINLEVEL_OUTOF10: 6

## 2023-01-03 ASSESSMENT — PAIN DESCRIPTION - FREQUENCY: FREQUENCY: CONTINUOUS

## 2023-01-03 ASSESSMENT — PAIN DESCRIPTION - DESCRIPTORS
DESCRIPTORS: ACHING;SORE
DESCRIPTORS: DISCOMFORT;SORE;ACHING

## 2023-01-03 ASSESSMENT — PAIN DESCRIPTION - ORIENTATION: ORIENTATION: MID

## 2023-01-03 ASSESSMENT — PAIN DESCRIPTION - LOCATION
LOCATION: STERNUM;INCISION
LOCATION: BACK

## 2023-01-03 ASSESSMENT — PAIN DESCRIPTION - ONSET: ONSET: ON-GOING

## 2023-01-03 ASSESSMENT — PAIN DESCRIPTION - PAIN TYPE: TYPE: SURGICAL PAIN

## 2023-01-03 ASSESSMENT — PAIN - FUNCTIONAL ASSESSMENT: PAIN_FUNCTIONAL_ASSESSMENT: PREVENTS OR INTERFERES SOME ACTIVE ACTIVITIES AND ADLS

## 2023-01-03 NOTE — PROGRESS NOTES
CVICU Admission Note    Name: Aleksandr Cook  MRN: [id number]    CC: Postoperative Critical Care Management     Indication for Surgery/Procedure: NSTEMI/CAD  LVEF:  40%    RVF:  NML    Important/Relevant PMH/PSH: ICM, HTN, HLD, left carotid stenosis, pulmonary hypertension, small hiatal hernia     Procedure/Surgeries: 1/3/2023 Urgent sternotomy/Urgent CABG x 3 (LIMA-LAD, CryoVein-OM, CryoVein-RCA)/MYNOR exclusion with 35mm AtriClip/Rigid internal fixation of the sternum with [place] plates x 2    Pacing wires: Ventricular       Physical Exam:    BP (!) 127/58   Pulse 69   Temp 97.3 °F (36.3 °C)   Resp 13   Ht 5' 4\" (1.626 m)   Wt 142 lb (64.4 kg)   SpO2 100%   BMI 24.37 kg/m²     Recent Labs     01/03/23  1345   WBC 9.9   RBC 3.22*   HGB 9.6*   HCT 29.5*   MCV 91.6   MCH 29.8   MCHC 32.5   RDW 12.4      MPV 8.9     Recent Labs     01/02/23  0950 01/03/23  1141 01/03/23  1302 01/03/23  1348     --   --   --    K 4.1   < > 4.0 3.99     --   --   --    CO2 23  --   --   --    BUN 18  --   --   --    CREATININE 0.9   < > 0.8  --    GLUCOSE 114*  --   --   --    CALCIUM 10.3*  --   --   --     < > = values in this interval not displayed. Post operative CXR:        Atelectasis, No pneumothorax noted, Mildly increased vascular markings bilateral, No significant pleural effusion. ETT/lines/drains appear to be in proper position. Final Radiology report pending. General Appearance: Arrived to ICU intubated placed on ventilator, Epi infusion for hemodynamic support   Eyes: PERRL  Pulmonary: Diminished bibasilar. No wheezes, no accessory muscle use noted   Ventilator: Mode: AC/VC, 60 FiO2, 6 PEEP, 450 Vt   Cardiovascular: RRR, no heaves or thrills palpated   Telemetry: SR  Abdomen: Soft  Extremities:  Palpable pulses all extremities, no edema   Neurologic/Psych: Sedated   Skin: Warm and dry    Incision: MSI with landry dressing intact      Assessment/Plan: Day of Surgery     1.  NSEMI/CAD S/p CABGx3 (LIMA-LAD, CryoVein-OM, CryoVein-RCA)/MYNOR exclusion with 35mm AtriClip  - DAPT, Zocor  - Perioperative Ancef  - Monitor chest tube output and hemodynamics closely    2. Acute Pulmonary Insufficiency Following Surgery    - Expected 2/2 surgery  - Intubated on ventilator  - Wean vent settings and extubate patient once awake, following commands, XIONG, and no signs of bleeding  - ABGs per protocol and PRN     3. HFrEF/ICM  -EF~40  -On low dose Epi, target maps>65. RVSP 30s. CI 1.9    4. Acute Post Operative Pain   - PRN fentanyl for pain management until able to take PO     5. Anemia   - EGD 12/27/2022-small hiatal hernia, mild gastritis       Electronically signed by LUCILLE Neri - CNP on 1/3/2023 at 2:08 PM

## 2023-01-03 NOTE — ANESTHESIA PRE PROCEDURE
Department of Anesthesiology  Preprocedure Note       Name:  Mónica Dickens   Age:  78 y.o.  :  1945                                          MRN:  [id number]         Date:  1/3/2023      Surgeon: Austin Luo):  Loyda Hernandez MD    Procedure: Procedure(s):  CABG CORONARY ARTERY BYPASS, BRISEIDA    Medications prior to admission:   Prior to Admission medications    Medication Sig Start Date End Date Taking?  Authorizing Provider   losartan (COZAAR) 50 MG tablet Take 50 mg by mouth at bedtime 22   Historical Provider, MD   amLODIPine (NORVASC) 10 MG tablet Take 1 tablet by mouth daily  Patient not taking: Reported on 2022   Dandy Norris MD   amLODIPine (NORVASC) 10 MG tablet Take 1 tablet by mouth daily  Patient not taking: Reported on 2022   Dandy Norris MD   Cholecalciferol (VITAMIN D) 125 MCG (5000 UT) CAPS Take 5,000 Units by mouth daily  Patient not taking: Reported on 2022    Historical Provider, MD   vitamin E 1000 units capsule Take 1,000 Units by mouth daily    Historical Provider, MD   Calcium Carb-Cholecalciferol (CALCIUM 1000 + D) 1000-800 MG-UNIT TABS Take by mouth daily  Patient not taking: Reported on 2022    Historical Provider, MD   MAGNESIUM CHLORIDE-CALCIUM PO Take by mouth daily  Patient not taking: Reported on 2022    Historical Provider, MD   PROMETHAZINE HCL PO Take by mouth as needed    Historical Provider, MD   DEXTROMETHORPHAN HBR PO Take by mouth as needed    Historical Provider, MD   simvastatin (ZOCOR) 40 MG tablet Take 40 mg by mouth nightly    Historical Provider, MD   isosorbide mononitrate (IMDUR) 30 MG extended release tablet TAKE 1 TABLET DAILY  Patient not taking: Reported on 2022   Dandy Norris MD   ibuprofen (ADVIL;MOTRIN) 800 MG tablet Take 800 mg by mouth as needed  3/12/17   Historical Provider, MD   aspirin-acetaminophen-caffeine (Monica Resendiz) 213-741-32 MG per tablet Take 1 tablet by mouth every 6 hours as needed for Headaches    Historical Provider, MD   zolpidem (AMBIEN) 10 MG tablet Take 10 mg by mouth nightly.      Historical Provider, MD   traZODone HCl 150 MG TB24 Take 75 mg by mouth nightly     Historical Provider, MD       Current medications:    Current Facility-Administered Medications   Medication Dose Route Frequency Provider Last Rate Last Admin    metoprolol succinate (TOPROL XL) extended release tablet 25 mg  25 mg Oral BID Lisa Collar, PA-C   25 mg at 01/03/23 0636    sodium chloride flush 0.9 % injection 5-40 mL  5-40 mL IntraVENous 2 times per day Lisa Collar, PA-C   10 mL at 01/03/23 1070    sodium chloride flush 0.9 % injection 5-40 mL  5-40 mL IntraVENous PRN Lisa Collar, PA-C        0.9 % sodium chloride infusion   IntraVENous PRN Lisa Collar, PA-C        ceFAZolin (ANCEF) 2,000 mg in sterile water 20 mL IV syringe  2,000 mg IntraVENous On Call to [address] [place] [address],[address], PA-CEFERINO        mupirocin (BACTROBAN) 2 % ointment   Nasal BID Lisa Collar, PA-C   Given at 01/02/23 2202    chlorhexidine (HIBICLENS) 4 % liquid   Topical See Admin Instructions Lisa Collar, PA-C   Given at 01/02/23 1900    guaiFENesin tablet 400 mg  400 mg Oral TID April LUCILLE Lassiter - CNP   400 mg at 01/03/23 7946    amLODIPine (NORVASC) tablet 5 mg  5 mg Oral Daily Stacy Koch MD   5 mg at 01/03/23 3711    furosemide (LASIX) tablet 20 mg  20 mg Oral Daily Stacy Koch MD   20 mg at 01/02/23 1474    spironolactone (ALDACTONE) tablet 25 mg  25 mg Oral Daily Stacy Koch MD   25 mg at 01/03/23 6048    mupirocin (BACTROBAN) 2 % ointment   Nasal BID April LUCILLE Lassiter - CNP   Given at 01/03/23 0753    0.9 % sodium chloride infusion   IntraVENous Continuous Stacy Koch MD 20 mL/hr at 01/02/23 2200 Rate Verify at 01/02/23 2200    [Held by provider] isosorbide mononitrate (IMDUR) extended release tablet 60 mg  60 mg Oral Daily Stacy Koch MD   60 mg at 01/01/23 1220    lactulose (CHRONULAC) 10 GM/15ML solution 20 g  20 g Oral BID Miley Eagle MD   20 g at 01/01/23 2051    pantoprazole (PROTONIX) tablet 40 mg  40 mg Oral QAM AC Miley Eagle MD   40 mg at 01/02/23 0548    nitroGLYCERIN (NITROSTAT) SL tablet 0.4 mg  0.4 mg SubLINGual Q5 Min PRN Miley Eagle MD   0.4 mg at 12/24/22 1505    heparin (porcine) injection 4,000 Units  4,000 Units IntraVENous PRN Miley Eagle MD        heparin (porcine) injection 2,000 Units  2,000 Units IntraVENous PRN Miley Eagle MD   2,000 Units at 01/01/23 1940    zolpidem (AMBIEN) tablet 10 mg  10 mg Oral Nightly Miley Eagle MD   10 mg at 01/02/23 2202    sodium chloride flush 0.9 % injection 5-40 mL  5-40 mL IntraVENous 2 times per day Miley Eagle MD   10 mL at 01/01/23 2054    sodium chloride flush 0.9 % injection 5-40 mL  5-40 mL IntraVENous PRN Miley Eagle MD   10 mL at 12/24/22 1812    0.9 % sodium chloride infusion   IntraVENous PRN Miley Egale MD        ondansetron (ZOFRAN-ODT) disintegrating tablet 4 mg  4 mg Oral Q8H PRN Miley Eagle MD        Or    [facility] injection 4 mg  4 mg IntraVENous Q6H PRN Miley Eagle MD        acetaminophen (TYLENOL) tablet 650 mg  650 mg Oral Q6H PRN Miley Eagle MD   650 mg at 12/31/22 1620    Or    acetaminophen (TYLENOL) suppository 650 mg  650 mg Rectal Q6H PRN Miley Eagle MD        polyethylene glycol (GLYCOLAX) packet 17 g  17 g Oral Daily PRN Miley Eagle MD        aspirin chewable tablet 81 mg  81 mg Oral Daily Miley Eagle MD   81 mg at 01/03/23 0814    nitroGLYCERIN (NITROSTAT) SL tablet 0.4 mg  0.4 mg SubLINGual Q5 Min PRN Miley Eagle MD        magnesium sulfate 2000 mg in 50 mL IVPB premix  2,000 mg IntraVENous PRN Miley Eagle MD        potassium chloride (KLOR-CON M) extended release tablet 40 mEq  40 mEq Oral PRN Miley Eagle MD        Or    potassium bicarb-citric acid (EFFER-K) effervescent tablet 40 mEq  40 mEq Oral PRN Jose Oneill MD        Or    potassium chloride 10 mEq/100 mL IVPB (Peripheral Line)  10 mEq IntraVENous PRN Jose Oneill MD        simvastatin (ZOCOR) tablet 40 mg  (Patient Supplied)  40 mg Oral Nightly Jose Oneill MD   40 mg at 01/02/23 2202    ALPRAZolam (XANAX) tablet 0.25 mg  0.25 mg Oral TID PRN Jose Oneill MD   0.25 mg at 01/02/23 1446       Allergies: Allergies   Allergen Reactions    Lipitor [Atorvastatin]      Caused my bones to ache        Problem List:    Patient Active Problem List   Diagnosis Code    Bilateral carotid artery stenosis I65.23    NSTEMI (non-ST elevated myocardial infarction) ([facility]) I21.4    Acute heart failure ([facility]) A36.8    Systolic murmur B81.2    EKG, abnormal R94.31    Abnormal nuclear stress test R94.39    Primary hypertension I10    Hyperlipidemia E78.5    Carotid artery disease (HCC) I77.9    CAD (coronary artery disease) I25.10       Past Medical History:        Diagnosis Date    ARJUN (cerebral atherosclerosis)     History of blood transfusion     Hyperlipidemia     Hypertension        Past Surgical History:        Procedure Laterality Date    CARDIAC CATHETERIZATION  12/30/2022    Dr. Geovany Encarnacion ENDOSCOPY N/A 12/27/2022    EGD ESOPHAGOGASTRODUODENOSCOPY performed by Sahil Stone MD at [facility] ENDOSCOPY       Social History:    Social History     Tobacco Use    Smoking status: Never    Smokeless tobacco: Never   Substance Use Topics    Alcohol use:  No                                Counseling given: Not Answered      Vital Signs (Current):   Vitals:    01/02/23 2200 01/03/23 0000 01/03/23 0600 01/03/23 0654   BP: 117/78 125/75 135/67 (!) 127/58   Pulse: 68 73 60 81   Resp: 18 18 18 18   Temp: 98.2 °F (36.8 °C) 98 °F (36.7 °C) 98.3 °F (36.8 °C) 98.2 °F (36.8 °C)   TempSrc: Temporal Temporal Temporal Temporal   SpO2: 96% 97% 95% 95%   Weight:   142 lb (64.4 kg)    Height:   5' 4\" (1.626 m)                                               BP Readings from Last 3 Encounters:   01/03/23 (!) 127/58   12/14/22 138/78   01/13/22 132/76       NPO Status: Time of last liquid consumption: 2200                        Time of last solid consumption: 1800                        Date of last liquid consumption: 01/02/23                        Date of last solid food consumption: 01/02/23    BMI:   Wt Readings from Last 3 Encounters:   01/03/23 142 lb (64.4 kg)   12/14/22 155 lb (70.3 kg)   06/14/22 162 lb (73.5 kg)     Body mass index is 24.37 kg/m². CBC:   Lab Results   Component Value Date/Time    WBC 4.9 01/02/2023 09:50 AM    RBC 4.00 01/02/2023 09:50 AM    HGB 11.6 01/02/2023 09:50 AM    HCT 34.9 01/02/2023 09:50 AM    MCV 87.3 01/02/2023 09:50 AM    RDW 12.7 01/02/2023 09:50 AM     01/02/2023 09:50 AM       CMP:   Lab Results   Component Value Date/Time     01/02/2023 09:50 AM    K 4.1 01/02/2023 09:50 AM    K 4.1 12/29/2022 05:15 AM     01/02/2023 09:50 AM    CO2 23 01/02/2023 09:50 AM    BUN 18 01/02/2023 09:50 AM    CREATININE 0.9 01/02/2023 09:50 AM    LABGLOM >60 01/02/2023 09:50 AM    GLUCOSE 114 01/02/2023 09:50 AM    PROT 7.2 01/02/2023 09:50 AM    CALCIUM 10.3 01/02/2023 09:50 AM    BILITOT 0.6 01/02/2023 09:50 AM    ALKPHOS 71 01/02/2023 09:50 AM    AST 15 01/02/2023 09:50 AM    ALT 19 01/02/2023 09:50 AM       POC Tests: No results for input(s): POCGLU, POCNA, POCK, POCCL, POCBUN, POCHEMO, POCHCT in the last 72 hours.     Coags:   Lab Results   Component Value Date/Time    PROTIME 12.9 01/02/2023 09:50 AM    INR 1.2 01/02/2023 09:50 AM    APTT 66.2 01/03/2023 06:00 AM       HCG (If Applicable): No results found for: PREGTESTUR, PREGSERUM, HCG, HCGQUANT     ABGs: No results found for: PHART, PO2ART, ZYT7YQB, FMY2WVQ, BEART, P3OQIEAR     Type & Screen (If Applicable):  No results found for: LABABO, LABRH    Drug/Infectious Status (If Applicable):  No results found for: HIV, HEPCAB    COVID-19 Screening (If Applicable): No results found for: COVID19        Anesthesia Evaluation  Patient summary reviewed and Nursing notes reviewed no history of anesthetic complications:   Airway: Mallampati: II  TM distance: >3 FB   Neck ROM: full  Mouth opening: > = 3 FB   Dental:      Comment: Missing molars    Pulmonary: breath sounds clear to auscultation                            ROS comment: Chronic cough   Cardiovascular:  Exercise tolerance: poor (<4 METS),   (+) hypertension:, valvular problems/murmurs:, past MI:, CAD:, WALKER:,         Rhythm: regular  Rate: normal                 ROS comment: Recent MI. Chief complaint was SOB, WALKER, fatigue     Neuro/Psych:   Negative Neuro/Psych ROS              GI/Hepatic/Renal: Neg GI/Hepatic/Renal ROS            Endo/Other: Negative Endo/Other ROS                    Abdominal:             Vascular: negative vascular ROS. Other Findings:       Summary   Overall ejection fraction moderately decreased. Normal right ventricular size and function. Moderately dilated left atrium. Moderate mitral regurgitation which appears function secondary to the   inferolateral wall tethering the papillary. Mild tricuspid regurgitation. RVSP is 35 mmHg. PRESSURES:  RA 17/16 (14). RV 46/13, 10.  PA 53/24 (36). PCW 28/47 (34).     OXYGEN SATURATIONS:  Femoral artery (pulse oximetry) 93%. Pulmonary artery 61.1%.     THERMODILUTION CARDIAC OUTPUT:  4.02 liters per minute; cardiac index  2.3 liters per minute per meter square.     CARDIAC OUTPUT (Rylie):  3.59 liters per minute; cardiac index 2.1 liters  per minute per meter square.     The right femoral venous sheath was removed at the end of the procedure,  and digital pressure was applied with achievement of adequate  hemostasis.     The patient tolerated the procedure well and left the cardiac  catheterization laboratory in stable condition.     ESTIMATED BLOOD LOSS:  10 mL.     CONCLUSIONS:  1. Moderate pulmonary hypertension. 2.  Elevated pulmonary capillary wedge pressure. 3.  Reduced cardiac output/cardiac index. Atherosclerotic disease. Estimated stenosis by NASCET criteria in the proximal right carotid   artery is between 0% to 49% . Estimated stenosis by NASCET criteria in the proximal left carotid   artery is between 50% to 69%. CORONARY ANGIOGRAPHY:  Left main:  The left main artery has an eccentric 90% distal vessel  stenosis just before the origins of the left anterior descending artery  and the left circumflex.     LAD:  The left anterior descending artery is calcified in its proximal  segment.  The proximal LAD is diffusely diseased with up to 95% luminal  narrowing. Gregery Mar is also 80 to 90% disease of the LAD after the  diagonal branch in the middle segment.  The distal LAD after this lesion  over a long segment did not appear to have any significant angiographic  disease.  The diagonal branch itself did not appear to have any  significant angiographic disease.     LCX:  The left circumflex is a large but nondominant vessel.  It gives  rise to two small first and second marginal branches.  There is 50%  narrowing in the mid left circumflex before a large marginal branch and  the terminal lateral branch.     RCA:  The right coronary artery is a dominant vessel providing the  posterior descending artery branch and is occluded at the mid vessel  level with the distal vessel filling from right-to-right collaterals.     LEFT VENTRICULOGRAPHY:  The left ventricle is dilated.  There was  moderate generalized hypokinesis more so involving the left ventricular  apex with an estimated ejection fraction of 35 to 40%.     CONCLUSIONS:  1.  Severe multivessel coronary artery disease as described above. 2.  Dilated left ventricle with an estimated ejection fraction of 35 to  40%.   3.  Elevated left ventricular end-diastolic pressure.        Anesthesia Plan      general     ASA 4 Induction: intravenous. arterial line, PA catheter and BRISEIDA  MIPS: Postoperative opioids intended, Prophylactic antiemetics administered and Postoperative trial extubation. Anesthetic plan and risks discussed with patient. Use of blood products discussed with patient whom consented to blood products. Plan discussed with CRNA. DOS STAFF ADDENDUM:    Patient seen and chart reviewed. Physical exam and history updated as indicated. NPO status confirmed. Anesthesia options and plan discussed including risks benefits with patient/legal guardian and family as available. Concerns and questions addressed. Consent verbalized to proceed.   Anesthesia plan, options and intraoperative/postoperative concerns discussed with care team.    Warden Lanny MD, MD  1/3/2023  9:09 Sánchez Perez MD   1/3/2023

## 2023-01-03 NOTE — PROGRESS NOTES
01/03/23 1724   Patient Observation   Observations post  [facility] . spontaneous breathingn parameters performed on Tubing compensation with oxygen and peep . documented. pt sxd oet and orally ,  balloon deflated . oet removed by Rt and Rn at bedside as well. oxygen via5 lpm nasal cannula applied . pt verbalized loud and clear voice. resp rate 16-20 comfortable spo2 95 via ox. etco2, 22 applied nasally breath sounds clear bilaterally.

## 2023-01-03 NOTE — PROGRESS NOTES
Inpatient Cardiology Post CAGB Progress note     PATIENT IS BEING FOLLOWED FOR: NSTEMI / Ischemic CMP - HFrEF / Multivessel CAD - S/P CABG     Magalys Jefferson is a 78 y.o. female followed by Dr. Dylan Vidal.     PMHx: HTN, HLD - consulted 12/24/2022 for CP/ SOB - elevated troponin - NSTEMI     TTE 12/26/22 LVEF 40% (full report below)    Anemia - EGD 12/27/22: small hiatal hernia with mild antral gastritis --> per general surgery: \" EGD ok, no upper GIB, highly unlikely to be LGIB    [facility] 12/28/2022 Dr Pearl Kill: Severe multivessel CAD LVEF 35-40%     [facility] 12/30/22 Mod Pulm HTN C.I. 2.1     Today (1/3/2022) Dr. Clemencia Benton:  S/p Urgent sternotomy/Urgent CABG x 3 (LIMA-LAD, CryoVein-OM, CryoVein-RCA) / MYNOR exclusion with 35mm AtriClip/Rigid internal fixation of the sternum with Gaastra plates x 2       SUBJECTIVE: post op - intubated & on vent - awake to verbal stimuli - shakes head yes & no appropriately   OBJECTIVE: No apparent distress on vent     ROS: Limited intubated & on Vent - shakes head no to pain     PHYSICAL EXAM:   BP (!) 127/58   Pulse 81   Temp 97.7 °F (36.5 °C) (Core)   Resp 20   Ht 5' 4\" (1.626 m)   Wt 142 lb (64.4 kg)   SpO2 100%   BMI 24.37 kg/m²    B/P Range last 24 hours: Systolic (08QGQ), KRC:930 , Min:117 , JGA:173    Diastolic (85SGZ), DON:76, Min:58, Max:78    CONST: Well developed, white female who appears stated age. Awake, alert and cooperative. Intubated / On Vent & epi drip (1.5 juliana / min) No - apparent distress  HEENT:   Head- Normocephalic, atraumatic   Eyes- Conjunctivae pale  Throat- Oral mucosa pink and moist - intubated / on vent  Neck-  No stridor, trachea midline, SAI jugular venous distention.  SAI carotid bruit / R IJ cental line   CHEST: Chest symmetrical No accessory muscle use or intercostal retractions / mid-sternal incision with EFRAÍN dressing / CT X 4 to sx - draining bloody   RESPIRATORY:  Lung sounds - clear throughout ant fields / diminished lateral fields / Vent A/C FIO2 40% CARDIOVASCULAR:     Heart Inspection- shows no noted pulsations / Ventricular wires attached to box   Heart Palpation- no heaves or thrills  Heart Ausculation- Regular rate and rhythm, no murmur. + rub   PV: R brachial art line / No lower extremity edema. L leg dressing dry & intact - No varicosities. Pedal pulses palpable, no clubbing or cyanosis   ABDOMEN: Soft, non-tender to light palpation. Bowel sounds - hypoactive . No palpable masses no organomegaly; no abdominal bruit  MS: Good muscle strength and tone. No atrophy or abnormal movements. : Deferred - chavarria draining yellow urine  SKIN: Pale Warm and dry   NEURO / PSYCH: Limited due to on vent - awake - Follows all commands - shakes head appropriately.        Intake/Output Summary (Last 24 hours) at 1/3/2023 1425  Last data filed at 1/3/2023 1411  Gross per 24 hour   Intake 3481.05 ml   Output 2410 ml   Net 1071.05 ml       Weight:   Wt Readings from Last 3 Encounters:   01/03/23 142 lb (64.4 kg)   12/14/22 155 lb (70.3 kg)   06/14/22 162 lb (73.5 kg)     Current Inpatient Medications:   sodium chloride flush  5-40 mL IntraVENous 2 times per day    [START ON 1/4/2023] aspirin  81 mg Oral Daily    chlorhexidine  15 mL Mouth/Throat BID    [START ON 1/4/2023] magnesium oxide  400 mg Oral Daily    mupirocin   Nasal BID    [START ON 1/4/2023] sennosides-docusate sodium  1 tablet Oral BID    [START ON 1/4/2023] pantoprazole  40 mg Oral Daily    pantoprazole (PROTONIX) 40 mg injection  40 mg IntraVENous Once    ceFAZolin (ANCEF) IVPB  2,000 mg IntraVENous Q8H    ipratropium-albuterol  1 ampule Inhalation Q4H WA    aspirin  81 mg Oral Once    insulin lispro  0-4 Units SubCUTAneous Q4H    [START ON 1/4/2023] clopidogrel  75 mg Oral Daily    [START ON 1/4/2023] tamsulosin  0.4 mg Oral Daily    propofol        simvastatin  40 mg Oral Nightly       IV Infusions (if any):   sodium chloride      sodium chloride      propofol      sodium chloride      norepinephrine nitroprusside (NIPRIDE) 50 mg in D5W infusion      insulin Stopped (01/03/23 1346)    dextrose      EPINEPHrine infusion 1.5 mcg/min (01/03/23 1407)       DIAGNOSTIC/ LABORATORY DATA:  Labs:   CBC:   Recent Labs     01/02/23  0950 01/03/23  1141 01/03/23  1302 01/03/23  1345   WBC 4.9  --   --  9.9   HGB 11.6  --   --  9.6*   HCT 34.9   < > 25.0* 29.5*     --   --  204    < > = values in this interval not displayed. BMP:   Recent Labs     01/02/23  0950 01/03/23  1141 01/03/23  1238 01/03/23  1302 01/03/23  1348     --   --   --   --    K 4.1   < > 4.6 4.0 3.99   CO2 23  --   --   --   --    BUN 18  --   --   --   --    CREATININE 0.9   < > 0.9 0.8  --    LABGLOM >60  --   --   --   --    CALCIUM 10.3*  --   --   --   --     < > = values in this interval not displayed. Mag: No results for input(s): MG in the last 72 hours. Phos: No results for input(s): PHOS in the last 72 hours. TFT: No results found for: TSH, L8TYPIZ, X9UTSYO, THYROIDAB, FT3, T4FREE   HgA1c:   Lab Results   Component Value Date    LABA1C 5.4 12/28/2022         PT/INR:   Recent Labs     01/02/23  0950 01/03/23  1345   PROTIME 12.9* 14.3*   INR 1.2 1.3     APTT:  Recent Labs     01/03/23  0600 01/03/23  1345   APTT 66.2* 30.2       FASTING LIPID PANEL:  Lab Results   Component Value Date/Time    CHOL 147 12/25/2022 05:13 AM    HDL 63 12/25/2022 05:13 AM    LDLCALC 61 12/25/2022 05:13 AM    TRIG 113 12/25/2022 05:13 AM     LIVER PROFILE:  Recent Labs     01/02/23  0950   AST 15   ALT 19   LABALBU 3.8       Telemetry: NSR 70's     CXR: post op pending     CT Chest: 12/29/2022 - Thoracic aortic aneurysm 3.4 cm    Carotid US L 50-69%       Echo 12/26/22 ( Dr. Sola Camacho ):   Summary   The left ventricle is mildly dilated. There is apical wall akinesis, distal septal dyskinesis and distal   inferior wall hypokinesis with thinning corresponding thinning of the   myocardium. Ejection fraction is visually estimated at 40%.    There is doppler evidence of stage I diastolic dysfunction. Normal right ventricular size and function. Normal size atria. Mild mitral regurgitation. Mild tricuspid regurgitation. Moderate pulmonary hypertension. Cath 12/28/22 ( Dr. Sola Camacho ):  PRESSURES:  Aorta 114/55 with a mean of 80. Left ventricular systolic pressure 177, left ventricular end-diastolic  pressure 26. There was no significant gradient across the aortic valve. CORONARY ANGIOGRAPHY:  Left main:  The left main artery has an eccentric 90% distal vessel  stenosis just before the origins of the left anterior descending artery  and the left circumflex. LAD:  The left anterior descending artery is calcified in its proximal  segment. The proximal LAD is diffusely diseased with up to 95% luminal  narrowing. There is also 80 to 90% disease of the LAD after the  diagonal branch in the middle segment. The distal LAD after this lesion  over a long segment did not appear to have any significant angiographic  disease. The diagonal branch itself did not appear to have any  significant angiographic disease. LCX:  The left circumflex is a large but nondominant vessel. It gives  rise to two small first and second marginal branches. There is 50%  narrowing in the mid left circumflex before a large marginal branch and  the terminal lateral branch. RCA:  The right coronary artery is a dominant vessel providing the  posterior descending artery branch and is occluded at the mid vessel  level with the distal vessel filling from right-to-right collaterals. LEFT VENTRICULOGRAPHY:  The left ventricle is dilated. There was  moderate generalized hypokinesis more so involving the left ventricular  apex with an estimated ejection fraction of 35 to 40%. CONCLUSIONS:  1. Severe multivessel coronary artery disease as described above. 2.  Dilated left ventricle with an estimated ejection fraction of 35 to  40%.   3.  Elevated left ventricular end-diastolic pressure. BRISEIDA 12/30/22 ( Dr. Robert Arambula ):  Overall ejection fraction moderately decreased. Normal right ventricular size and function. Moderately dilated left atrium. Moderate mitral regurgitation which appears function secondary to the   inferolateral wall tethering the papillary. Mild tricuspid regurgitation. RVSP is 35 mmHg. [address] 12/30/33 ( Dr. Pavel oCta ):  PRESSURES:  RA 17/16 (14). RV 46/13, 10.  PA 53/24 (36). PCW 28/47 (34). OXYGEN SATURATIONS:  Femoral artery (pulse oximetry) 93%. Pulmonary artery 61.1%. THERMODILUTION CARDIAC OUTPUT:  4.02 liters per minute; cardiac index  2.3 liters per minute per meter square. CARDIAC OUTPUT (Rylie):  3.59 liters per minute; cardiac index 2.1 liters  per minute per meter square. CONCLUSIONS:  1.  Moderate pulmonary hypertension. 2.  Elevated pulmonary capillary wedge pressure. 3.  Reduced cardiac output/cardiac index. Electronically signed by LUCILLE Greene on 1/3/2023 at 2:25 PM   [facility] Cardiology progress note  Fely Zavala     I have personally participated in a face-to-face and personally obtained history and performed physical exam on the date of service. I reviewed chart, vitals, labs and radiologic studies. I also participated in medical decision making with LUCILLE Greene on the date of service All of the assessments and recommendations are from me and I agree with all of the pertinent clinical information, assessment and treatment plan. I have reviewed and edited the note above based on my findings during my history, exam, and decision making. Please see my additional contributions to the history, physical exam, assessment, and recommendations below.   Patient is seen in follow-up for non-ST elevation MI    Subjective:     Ms. Cofmort Perla in bed no apparent distress    ROS:  Unable to obtain since patient is intubated      Scheduled Meds:   sodium chloride flush  5-40 mL IntraVENous 2 times per day    [START ON 1/4/2023] aspirin  81 mg Oral Daily    chlorhexidine  15 mL Mouth/Throat BID    [START ON 1/4/2023] magnesium oxide  400 mg Oral Daily    mupirocin   Nasal BID    [START ON 1/4/2023] sennosides-docusate sodium  1 tablet Oral BID    [START ON 1/4/2023] pantoprazole  40 mg Oral Daily    ceFAZolin (ANCEF) IVPB  2,000 mg IntraVENous Q8H    ipratropium-albuterol  1 ampule Inhalation Q4H WA    aspirin  81 mg Oral Once    insulin lispro  0-4 Units SubCUTAneous Q4H    [START ON 1/4/2023] clopidogrel  75 mg Oral Daily    [START ON 1/4/2023] tamsulosin  0.4 mg Oral Daily    propofol        simvastatin  40 mg Oral Nightly     Continuous Infusions:   sodium chloride 100 mL/hr (01/03/23 1450)    sodium chloride      propofol Stopped (01/03/23 1430)    sodium chloride      norepinephrine      nitroprusside (NIPRIDE) 50 mg in D5W infusion      insulin Stopped (01/03/23 1346)    dextrose      EPINEPHrine infusion 3 mcg/min (01/03/23 1450)     PRN Meds:sodium chloride flush, sodium chloride, ondansetron **OR** ondansetron, oxyCODONE **OR** oxyCODONE, fentanNYL **OR** fentanNYL, propofol, [START ON 1/4/2023] magnesium hydroxide, [START ON 1/4/2023] bisacodyl, potassium chloride, magnesium sulfate, calcium chloride IVPB **OR** calcium chloride IVPB, albumin human, sodium chloride, norepinephrine, nitroprusside (NIPRIDE) 50 mg in D5W infusion, glucose, dextrose bolus **OR** dextrose bolus, glucagon (rDNA), dextrose, amiodarone, amiodarone bolus, magnesium sulfate, acetaminophen **OR** acetaminophen    I/O last 3 completed shifts:   In: 3220.3 [P.O.:495; I.V.:2725.3]  Out: 1400 [Urine:1400]  I/O this shift:  In: 2612.1 [I.V.:2282.1; Blood:330]  Out: 1915 [Urine:1075; Blood:700; Chest Tube:140]      Objective:      Physical Exam:   BP (!) 127/58   Pulse 80   Temp 97.7 °F (36.5 °C) (Core)   Resp 20   Ht 5' 4\" (1.626 m)   Wt 142 lb (64.4 kg)   SpO2 100%   BMI 24.37 kg/m²   CONSTITUTIONAL:  no apparent distress, and appears stated age  HEAD:  normocepalic, without obvious abnormality, atraumatic  NECK:  Supple, symmetrical, trachea midline, no adenopathy, thyroid symmetric, not enlarged and no tenderness, skin normal  LUNGS:  No increased work of breathing, No accessory muscle use or intercostal retractions, good air exchange, scattered rhonchi  CARDIOVASCULAR:  Normal apical impulse, regular rate and rhythm, normal S1 and S2, no S3 or S4, and no murmur noted, no edema, no JVD, no carotid bruit. ABDOMEN:  Soft, nontender, no masses, no hepatomegaly, no splenomegaly, BS+  MUSCULOSKELETAL:  No clubbing no cyanosis. there is no redness, warmth, or swelling of the joints  full range of motion noted  NEUROLOGIC:  Alert, awake,oriented x3  SKIN:  no bruising or bleeding, normal skin color, texture, turgor and no redness, warmth, or swelling      Cardiographics  I personally reviewed the telemetry monitor strips with the following interpretation: Sinus rhythm    Echocardiogram: 12/26/2022,Summary   The left ventricle is mildly dilated. There is apical wall akinesis, distal septal dyskinesis and distal   inferior wall hypokinesis with thinning corresponding thinning of the   myocardium. Ejection fraction is visually estimated at 40%. There is doppler evidence of stage I diastolic dysfunction. Normal right ventricular size and function. Normal size atria. Mild mitral regurgitation. Mild tricuspid regurgitation. Moderate pulmonary hypertension. Imaging  XR CHEST PORTABLE   Final Result   Postoperative changes with tubes and catheters as noted. Atelectasis/infiltrates and pleural effusion likely CHF and or pneumonia.          CT CHEST WO CONTRAST   Final Result   Limited evaluation of vascular structures due to lack of intravenous contrast   however no significant aneurysmal dilatation of the thoracic aorta maximum   transaxial diameter supravalvular ascending portion 3.4 cm with calcific involvement distal transverse through descending portion which appears   nonaneurysmal as well. Cardiac size enlarged with four-chamber cardiomegaly   demonstrating coronary calcifications. No pericardial effusion      Small bilateral pleural effusions right greater than left with minimal   adjacent atelectasis. US DUP LOWER EXTREMITY MAPPING BILAT VENOUS   Final Result   1. Bilateral venous mapping as described. 2. No superficial venous thrombosis visualized. US CAROTID ARTERY BILATERAL   Final Result   Atherosclerotic disease. Estimated stenosis by NASCET criteria in the proximal right carotid   artery is between 0% to 49% . Estimated stenosis by NASCET criteria in the proximal left carotid   artery is between 50% to 69%. VL LUCIANO BILATERAL LIMITED 1-2 LEVELS   Final Result      XR CHEST PORTABLE   Final Result   Cardiomegaly with  patchy perihilar and bibasilar infiltrates and effusions   likely CHF/edema and or pneumonia. Soft tissue prominence in the right hilum. Consider surveillance preferably   by CT scan.          XR CHEST PORTABLE    (Results Pending)       Lab Review   Lab Results   Component Value Date/Time     01/03/2023 01:45 PM    K 3.99 01/03/2023 01:48 PM    K 4.1 12/29/2022 05:15 AM     01/03/2023 01:45 PM    CO2 21 01/03/2023 01:45 PM    BUN 15 01/03/2023 01:45 PM    CREATININE 0.8 01/03/2023 01:45 PM    CREATININE 0.8 01/03/2023 01:02 PM    GLUCOSE 195 01/03/2023 01:45 PM    CALCIUM 8.3 01/03/2023 01:45 PM     Lab Results   Component Value Date/Time    WBC 9.9 01/03/2023 01:45 PM    HGB 9.6 01/03/2023 01:45 PM    HCT 29.5 01/03/2023 01:45 PM    HCT 25.0 01/03/2023 01:02 PM    MCV 91.6 01/03/2023 01:45 PM     01/03/2023 01:45 PM     I have personally reviewed the laboratory, cardiac diagnostic and radiographic testing as outlined above:    Assessment:     1.  CAD: S/p CABG with LIMA to LAD, CryoVein to OM, CryoVein to RCA, doing fine, will continue current treatment as per CV surgery  2. Ischemic cardiomyopathy  3. Mitral valve regurgitation: Mild  4. Tricuspid valve regurgitation: Mild  5. Pulmonary hypertension: Moderate  6. Hypertension:   7. Hyperlipidemia: On statin    Recommendations:     1. Continue current treatment  2. Basic metabolic panel and CBC in a.m. Discussed with nursing staff  Electronically signed by Eliceo Fletcher MD on 1/3/2023 at 5:33 PM    All of the above was discussed with the patient  reviewing the above stated recommendations. I directly participated in the medical-decision making, ordering tests, and medication adjustments as documented today. I contributed >50% to the overall encounter time, including face-to-face time providing coordination of care with the other providers, reviewing records/tests, ordering medications/tests/procedures, coordinating care, and documenting clinical information in the EHR. Randy Hidden NOTE: This report was transcribed using voice recognition software.  Every effort was made to ensure accuracy; however, inadvertent computerized transcription errors may be present

## 2023-01-03 NOTE — ANESTHESIA PROCEDURE NOTES
Arterial Line:    An arterial line was placed using surface landmarks, in the OR for the following indication(s): continuous blood pressure monitoring and blood sampling needed. A 20 gauge (size), 5 cm (length), Arrow (type) catheter was placed, Seldinger technique not used, into the right brachial artery, secured by tape. Anesthesia type: Local    Events:  patient tolerated procedure well with no complications.   Anesthesiologist: Ciarra Rome MD  Resident/CRNA: LUCILLE Rojo - CRNA  Performed: Resident/CRNA   Preanesthetic Checklist  Completed: patient identified, IV checked, site marked, risks and benefits discussed, surgical/procedural consents, equipment checked, pre-op evaluation, timeout performed, anesthesia consent given, oxygen available and monitors applied/VS acknowledged

## 2023-01-03 NOTE — PROGRESS NOTES
Spontaneous Parameters performed on tubing compensation with oxygen /peep    VT = 289 ml  f = 29  B/M  Ve = 7.37 L/M  NIF =   cmH2O  VC = 517 L  RSBI = 99      Performed by Basilio Pace RCP

## 2023-01-03 NOTE — PROGRESS NOTES
Patient admitted to [facility] room 3821 post op. Family updated on patients condition at this time.

## 2023-01-03 NOTE — PROGRESS NOTES
Hospitalist Progress Note      PCP: Alvina Sneed MD    Date of Admission: 12/24/2022        Hospital Course:  HAD BEEN HAVING CHEST PAIN, AND WAS FOR A HEART CATH AFTER THE 1ST OF THE YEAR.   IT BECAME WORSE AND SHE CAME TO THE ER, FOUND TO HAVE A NSTEMI **    FOR EGD TODAY, DUE TO DECREASING HGB  **IT SHOWED GASTRITIS**  HAS 3 V DISEASE**  CTA SURGERY ON TUESDAY              Subjective: in OR          Medications:  Reviewed    Infusion Medications    sodium chloride      sodium chloride      propofol      sodium chloride      norepinephrine      nitroprusside (NIPRIDE) 50 mg in D5W infusion      insulin Stopped (01/03/23 1346)    dextrose      EPINEPHrine infusion 1.5 mcg/min (01/03/23 1407)     Scheduled Medications    sodium chloride flush  5-40 mL IntraVENous 2 times per day    [START ON 1/4/2023] aspirin  81 mg Oral Daily    chlorhexidine  15 mL Mouth/Throat BID    [START ON 1/4/2023] magnesium oxide  400 mg Oral Daily    mupirocin   Nasal BID    [START ON 1/4/2023] sennosides-docusate sodium  1 tablet Oral BID    [START ON 1/4/2023] pantoprazole  40 mg Oral Daily    pantoprazole (PROTONIX) 40 mg injection  40 mg IntraVENous Once    ceFAZolin (ANCEF) IVPB  2,000 mg IntraVENous Q8H    ipratropium-albuterol  1 ampule Inhalation Q4H WA    aspirin  81 mg Oral Once    insulin lispro  0-4 Units SubCUTAneous Q4H    [START ON 1/4/2023] clopidogrel  75 mg Oral Daily    [START ON 1/4/2023] tamsulosin  0.4 mg Oral Daily    propofol        simvastatin  40 mg Oral Nightly     PRN Meds: sodium chloride flush, sodium chloride, ondansetron **OR** ondansetron, oxyCODONE **OR** oxyCODONE, fentanNYL **OR** fentanNYL, meperidine, propofol, [START ON 1/4/2023] magnesium hydroxide, [START ON 1/4/2023] bisacodyl, potassium chloride, magnesium sulfate, calcium chloride IVPB **OR** calcium chloride IVPB, albumin human, sodium chloride, norepinephrine, nitroprusside (NIPRIDE) 50 mg in D5W infusion, glucose, dextrose bolus **OR** dextrose bolus, glucagon (rDNA), dextrose, amiodarone, amiodarone bolus, magnesium sulfate, acetaminophen **OR** acetaminophen      Intake/Output Summary (Last 24 hours) at 1/3/2023 1427  Last data filed at 1/3/2023 1411  Gross per 24 hour   Intake 3481.05 ml   Output 2410 ml   Net 1071.05 ml       Exam:    BP (!) 127/58   Pulse 81   Temp 97.7 °F (36.5 °C) (Core)   Resp 20   Ht 5' 4\" (1.626 m)   Wt 142 lb (64.4 kg)   SpO2 100%   BMI 24.37 kg/m²               Labs:   Recent Labs     01/02/23  0950 01/03/23  1141 01/03/23  1238 01/03/23  1302 01/03/23  1345   WBC 4.9  --   --   --  9.9   HGB 11.6  --   --   --  9.6*   HCT 34.9   < > 21.0* 25.0* 29.5*     --   --   --  204    < > = values in this interval not displayed. Recent Labs     01/02/23  0950 01/03/23  1141 01/03/23  1205 01/03/23  1238 01/03/23  1302 01/03/23  1348     --   --   --   --   --    K 4.1   < > 5.5 4.6 4.0 3.99     --   --   --   --   --    CO2 23  --   --   --   --   --    BUN 18  --   --   --   --   --    CREATININE 0.9   < > 1.0 0.9 0.8  --    CALCIUM 10.3*  --   --   --   --   --     < > = values in this interval not displayed. Recent Labs     01/02/23  0950   AST 15   ALT 19   BILITOT 0.6   ALKPHOS 71     Recent Labs     01/02/23  0950 01/03/23  1345   INR 1.2 1.3     No results for input(s): Cleatrice Singer in the last 72 hours. Recent Labs     01/02/23  0950   AST 15   ALT 19   BILITOT 0.6   ALKPHOS 71     No results for input(s): LACTA in the last 72 hours.   No results found for: Pamela Ruiz  No results found for: AMMONIA    Assessment:    Active Hospital Problems    Diagnosis Date Noted    Acute pulmonary insufficiency [J98.4] 01/03/2023     Priority: Medium    Acute postoperative pain [G89.18] 01/03/2023     Priority: Medium    NSTEMI (non-ST elevated myocardial infarction) ([facility] [address] [I21.4] 12/24/2022     Priority: Medium    Acute heart failure ([facility] [address] [I50.9] 12/24/2022     Priority: Medium Systolic murmur [T33.6] 39/70/5072     Priority: Medium    EKG, abnormal [R94.31] 12/24/2022     Priority: Medium    Abnormal nuclear stress test [R94.39] 12/24/2022     Priority: Medium    Primary hypertension [I10] 12/24/2022     Priority: Medium    Hyperlipidemia [E78.5] 12/24/2022     Priority: Medium    Carotid artery disease ([address] [I77.9] 12/24/2022     Priority: Medium    CAD (coronary artery disease) [I25.10] 12/24/2022   CONSTIPATION   ANEMIA( iron def)        Plan:    SURGERY ON Tuesday  HEPARIN   ZOCOR   NORVASC   LACTULOSE  DVT Prophylaxis:  HEPARIN  Diet: ADULT DIET; Regular;  No Caffeine  Diet NPO Exceptions are: Sips of Water with Meds  Code Status: Full Code     PT/OT Eval Status:  ORDERED     Dispo -  HOME       Electronically signed by Sheila Dobbs DO on 1/3/2023 at 2:27 PM [facility]

## 2023-01-03 NOTE — ANESTHESIA PROCEDURE NOTES
Central Venous Line:    A central venous line was placed using ultrasound guidance, in the OR for the following indication(s): central venous access. Sterility preparation included the following: hand hygiene performed prior to procedure, maximum sterile barriers used and sterile technique used to drape from head to toe. The patient was placed in Trendelenburg position. The right internal jugular vein was prepped. The site was prepped with Chloraprep. A 9 Fr (size), introducer double lumen was placed. During the procedure, the following specific steps were taken: target vein identified, needle advanced into vein and blood aspirated and guidewire advanced into vein. Intravenous verification was obtained by ultrasound and venous blood return. Post insertion care included: all ports aspirated, all ports flushed easily, guidewire removed intact, Biopatch applied, line sutured in place and dressing applied. During the procedure the patient experienced: patient tolerated procedure well with no complications. Outcomes: uncomplicated and patient tolerated procedure well  Real-time US image taken/store: yes  Anesthesia type: localA(n) non-oximetric, 7.5 (size) Pulmonary Artery Catheter (PAC) was placed through the Introducer CVL in the right internal jugular vein. The PAC placement was confirmed by pressure tracing changes and BRISEIDA and secured at 47 cm (depth). The patient experienced the following events during the procedure: patient tolerated procedure well with no complications. PA Cath placed?: Yes  Staffing  Performed: Anesthesiologist   Anesthesiologist: Mey Romo MD  Preanesthetic Checklist  Completed: patient identified, IV checked, site marked, risks and benefits discussed, surgical/procedural consents, equipment checked, pre-op evaluation, timeout performed, anesthesia consent given, oxygen available, monitors applied/VS acknowledged, fire risk safety assessment completed and verbalized and blood product R/B/A discussed and consented

## 2023-01-03 NOTE — BRIEF OP NOTE
Brief Postoperative Note    Sumeet Reynolds  YOB: 1945  [id number]    Pre-operative Diagnosis: NSTEMI, CAD    Post-operative Diagnosis: Same    Operation: Urgent sternotomy/Urgent CABG x 3 (LIMA-LAD, CryoVein-OM, CryoVein-RCA)/MYNOR exclusion with 35mm AtriClip/Rigid internal fixation of the sternum with Eloy plates x 2    Anesthesia: General    Surgeon: Sangita Cantrell    Assistants: Joselo/Russ    Estimated Blood Loss: 8862    Complications: None    Specimens:   * No specimens in log *    Implants:  Implant Name Type Inv.  Item Serial No.  Lot No. LRB No. Used Action   IMPLANT HUM TISS B26-24EV DIA3-6MM SAPH VEIN CRYOPRESERVED - [id number]  IMPLANT HUM TISS L05-31AO DIA3-6MM SAPH VEIN CRYOPRESERVED [id number] [employer]  N/A 1 Implanted   DEVICE OCCL CLP L35MM PLUNG GRP FLX SHFT FOR GILLINOV - [id number]  DEVICE OCCL CLP L35MM PLUNG GRP FLX SHFT FOR GILLINOV  [employer]WD  N/A 1 Implanted   PLATE FIXATION STERNAL HEX 6 HOLES - [id number]  PLATE FIXATION STERNAL HEX 6 HOLES  [employer]OY [place]-WD  N/A 1 Implanted   PLATE FIXATION STERNAL LADDER 10 HOLES - [id number]  PLATE FIXATION STERNAL LADDER 10 HOLES  [employer]OY [place]-WD  N/A 1 Implanted   SCREW LOCKING SD 2.3X12MM 5P - [id number]  SCREW LOCKING SD 2.3X12MM 5P  ELOY [employer]  N/A 14 Implanted   SCREW LOCKING SD 2.3X13MM 5P - [id number]  SCREW LOCKING SD 2.3X13MM 5P  ELOY [place]  N/A 2 Implanted         Drains:   Chest Tube Mediastinal 1 (Active)       Chest Tube Mediastinal 2 (Active)       Chest Tube Pleural 3 (Active)       Chest Tube Pleural 4 (Active)       Urinary Catheter 01/03/23 Plascencia-Temperature (Active)       Findings: see dictation    Electronically signed by Jacob Sorto MD on 1/3/2023 at 12:59 PM

## 2023-01-03 NOTE — ANESTHESIA PROCEDURE NOTES
Procedure Performed: BRISEIDA       Start Time:        End Time:      Preanesthesia Checklist:  Patient identified, IV assessed, risks and benefits discussed, monitors and equipment assessed, procedure being performed at surgeon's request and anesthesia consent obtained. General Procedure Information  Diagnostic Indications for Echo:  assessment of surgical repair, hemodynamic monitoring and assessment of valve function  Physician Requesting Echo: Delisa Booker MD  Location performed:  OR  Intubated  Bite block placed  Heart visualized  Probe Insertion:  Easy  Probe Type:  3D and mulitplane  Modalities:  3D, color flow mapping, pulse wave Doppler and continuous wave Doppler    Echocardiographic and Doppler Measurements    Ventricles    Right Ventricle:  Cavity size normal.  Hypertrophy not present. Thrombus not present. Global function normal.    Left Ventricle:  Cavity size normal.  Hypertrophy not present. Thrombus not present. Global Function moderately impaired. Ejection Fraction 35%. Other Ventricular Findings:       Global LV hypokinesis. Anteroseptal-apical akinesis. Anterior wall hypokinesis. Inferior wall- basal segments hypokinesis     Ventricular Regional Function:  1- Basal Anteroseptal:  normal  2- Basal Anterior:  normal  3- Basal Anterolateral:  normal  4- Basal Inferolateral:  hypokinetic  5- Basal Inferior:  hypokinetic  6- Basal Inferoseptal:  normal  7- Mid Anteroseptal:  normal  8- Mid Anterior:  hypokinetic  9- Mid Anterolateral:  normal  10- Mid Inferolateral:  normal  11- Mid Inferior:  normal  12- Mid Inferoseptal:  normal  13- Apical Anterior:  akinetic  14- Apical Lateral:  hypokinetic  15- Apical Inferior:  hypokinetic  16- Apical Septal:  akinetic  17- [place]:  akinetic      Valves    Aortic Valve: Annulus normal.  Stenosis not present. Mean Gradient: 2 mmHg. Regurgitation none. Leaflets normal.  Leaflet motions normal.      Mitral Valve: Annulus calcified.   Stenosis not present. Vena Contracta Width: 0.3 cm. Regurgitation moderate. Leaflets calcified. Leaflet motions restricted. Specific leaflet segments with abnormal motions are described in the following comments:       posterior leaflet    Tricuspid Valve: Annulus normal.  Stenosis not present. Regurgitation mild. Leaflets normal.  Leaflet motions normal.    Pulmonic Valve: Annulus normal.  Stenosis not present. Regurgitation mild. Other Valve Findings:       Central MR with restricted motion of the posterior leaflet. ERO 0.2, MR volume 40, MR fraction 16%. Tenting height 0.9. Mild-moderate    Aorta    Ascending Aorta:  Size normal.  Dissection not present. Aortic Arch:  Size normal.  Dissection not present. Descending Aorta:  Size normal.  Dissection not present. Atria    Right Atrium:  Size normal.  Spontaneous echo contrast not present. Thrombus not present. Tumor not present. Device not present. Left Atrium:  Size normal.  Spontaneous echo contrast not present. Thrombus not present. Tumor not present. Device not present. Left atrial appendage normal.  Other Atria Findings:       LA 3.81 x 4.07    Septa    Atrial Septum:  Intra-atrial septal morphology normal.      Ventricular Septum:  Intra-ventricular septum morphology normal.          Other Findings  Pericardium:  normal  Pleural Effusion:  none  Pulmonary Arteries:  normal  Pulmonary Venous Flow:  normal    Anesthesia Information  Performed Personally  Anesthesiologist:  Hetal Cedillo MD      Echocardiogram Comments:       No difficulty with probe insertion or manipulationPost Intervention Follow-up Study  Aortic Function: unchangedMitral Function: improved1+Tricuspid Function: unchanged1+NoneComments: Improved global LV function with EF on 0.04 mcg/kg/min of epinephrine is 50%  Continues anteroseptal-apical akinesis. Improved anterior wall function.   Improved basal inferior wall function  RV size and function normal.  Aortic repair intact.   MR improved to mild, central.

## 2023-01-04 ENCOUNTER — APPOINTMENT (OUTPATIENT)
Dept: GENERAL RADIOLOGY | Age: 78
DRG: 233 | End: 2023-01-04
Payer: MEDICARE

## 2023-01-04 LAB
ANION GAP SERPL CALCULATED.3IONS-SCNC: 12 MMOL/L (ref 7–16)
B.E.: -6.1 MMOL/L (ref -3–3)
BUN BLDV-MCNC: 19 MG/DL (ref 6–23)
CALCIUM IONIZED: 1.37 MMOL/L (ref 1.15–1.33)
CALCIUM SERPL-MCNC: 9.7 MG/DL (ref 8.6–10.2)
CHLORIDE BLD-SCNC: 105 MMOL/L (ref 98–107)
CO2: 19 MMOL/L (ref 22–29)
CREAT SERPL-MCNC: 0.9 MG/DL (ref 0.5–1)
DELIVERY SYSTEMS: ABNORMAL
DEVICE: ABNORMAL
GFR SERPL CREATININE-BSD FRML MDRD: >60 ML/MIN/1.73
GLUCOSE BLD-MCNC: 155 MG/DL (ref 74–99)
HCO3: 18.9 MMOL/L (ref 22–26)
HCT VFR BLD CALC: 28.5 % (ref 34–48)
HEMATOCRIT: 26 % (ref 34–48)
HEMOGLOBIN: 8.9 G/DL (ref 11.5–15.5)
HEMOGLOBIN: 9.1 G/DL (ref 11.5–15.5)
INR BLD: 1.1
MAGNESIUM: 2.5 MG/DL (ref 1.6–2.6)
MCH RBC QN AUTO: 29 PG (ref 26–35)
MCHC RBC AUTO-ENTMCNC: 31.9 % (ref 32–34.5)
MCV RBC AUTO: 90.8 FL (ref 80–99.9)
METER GLUCOSE: 123 MG/DL (ref 74–99)
METER GLUCOSE: 129 MG/DL (ref 74–99)
METER GLUCOSE: 130 MG/DL (ref 74–99)
METER GLUCOSE: 133 MG/DL (ref 74–99)
METER GLUCOSE: 140 MG/DL (ref 74–99)
METER GLUCOSE: 148 MG/DL (ref 74–99)
O2 SATURATION: 96.2 % (ref 92–98.5)
OPERATOR ID: 1893
PCO2 37: 34.2 MMHG (ref 35–45)
PDW BLD-RTO: 13 FL (ref 11.5–15)
PH 37: 7.35 (ref 7.35–7.45)
PLATELET # BLD: 200 E9/L (ref 130–450)
PMV BLD AUTO: 9.6 FL (ref 7–12)
PO2 37: 86.3 MMHG (ref 60–80)
POC SOURCE: ABNORMAL
POTASSIUM SERPL-SCNC: 4.2 MMOL/L (ref 3.5–5)
POTASSIUM SERPL-SCNC: 4.6 MMOL/L (ref 3.5–5)
PROTHROMBIN TIME: 11.9 SEC (ref 9.3–12.4)
RBC # BLD: 3.14 E12/L (ref 3.5–5.5)
SODIUM BLD-SCNC: 136 MMOL/L (ref 132–146)
WBC # BLD: 12.3 E9/L (ref 4.5–11.5)

## 2023-01-04 PROCEDURE — 6370000000 HC RX 637 (ALT 250 FOR IP): Performed by: PHYSICIAN ASSISTANT

## 2023-01-04 PROCEDURE — 80048 BASIC METABOLIC PNL TOTAL CA: CPT

## 2023-01-04 PROCEDURE — 82803 BLOOD GASES ANY COMBINATION: CPT

## 2023-01-04 PROCEDURE — 82330 ASSAY OF CALCIUM: CPT

## 2023-01-04 PROCEDURE — 97166 OT EVAL MOD COMPLEX 45 MIN: CPT

## 2023-01-04 PROCEDURE — 37799 UNLISTED PX VASCULAR SURGERY: CPT

## 2023-01-04 PROCEDURE — 71045 X-RAY EXAM CHEST 1 VIEW: CPT

## 2023-01-04 PROCEDURE — 82962 GLUCOSE BLOOD TEST: CPT

## 2023-01-04 PROCEDURE — 97162 PT EVAL MOD COMPLEX 30 MIN: CPT

## 2023-01-04 PROCEDURE — 2000000000 HC ICU R&B

## 2023-01-04 PROCEDURE — 83735 ASSAY OF MAGNESIUM: CPT

## 2023-01-04 PROCEDURE — 97530 THERAPEUTIC ACTIVITIES: CPT

## 2023-01-04 PROCEDURE — 2700000000 HC OXYGEN THERAPY PER DAY

## 2023-01-04 PROCEDURE — 2580000003 HC RX 258: Performed by: PHYSICIAN ASSISTANT

## 2023-01-04 PROCEDURE — 85027 COMPLETE CBC AUTOMATED: CPT

## 2023-01-04 PROCEDURE — P9046 ALBUMIN (HUMAN), 25%, 20 ML: HCPCS | Performed by: NURSE PRACTITIONER

## 2023-01-04 PROCEDURE — 6360000002 HC RX W HCPCS: Performed by: PHYSICIAN ASSISTANT

## 2023-01-04 PROCEDURE — 94640 AIRWAY INHALATION TREATMENT: CPT

## 2023-01-04 PROCEDURE — 85610 PROTHROMBIN TIME: CPT

## 2023-01-04 PROCEDURE — 99024 POSTOP FOLLOW-UP VISIT: CPT | Performed by: NURSE PRACTITIONER

## 2023-01-04 PROCEDURE — 6360000002 HC RX W HCPCS: Performed by: NURSE PRACTITIONER

## 2023-01-04 PROCEDURE — 99232 SBSQ HOSP IP/OBS MODERATE 35: CPT | Performed by: INTERNAL MEDICINE

## 2023-01-04 PROCEDURE — 6370000000 HC RX 637 (ALT 250 FOR IP): Performed by: NURSE PRACTITIONER

## 2023-01-04 RX ORDER — METOPROLOL SUCCINATE 25 MG/1
12.5 TABLET, EXTENDED RELEASE ORAL DAILY
Status: DISCONTINUED | OUTPATIENT
Start: 2023-01-04 | End: 2023-01-05

## 2023-01-04 RX ADMIN — SENNOSIDES AND DOCUSATE SODIUM 1 TABLET: 50; 8.6 TABLET ORAL at 09:24

## 2023-01-04 RX ADMIN — OXYCODONE 5 MG: 5 TABLET ORAL at 07:08

## 2023-01-04 RX ADMIN — Medication 400 MG: at 09:32

## 2023-01-04 RX ADMIN — MUPIROCIN: 20 OINTMENT TOPICAL at 14:34

## 2023-01-04 RX ADMIN — METOPROLOL SUCCINATE 12.5 MG: 25 TABLET, EXTENDED RELEASE ORAL at 09:24

## 2023-01-04 RX ADMIN — CLOPIDOGREL BISULFATE 75 MG: 75 TABLET ORAL at 09:24

## 2023-01-04 RX ADMIN — CEFAZOLIN 2000 MG: 2 INJECTION, POWDER, FOR SOLUTION INTRAMUSCULAR; INTRAVENOUS at 12:26

## 2023-01-04 RX ADMIN — OXYCODONE 5 MG: 5 TABLET ORAL at 22:56

## 2023-01-04 RX ADMIN — SODIUM CHLORIDE 100 ML/HR: 9 INJECTION, SOLUTION INTRAVENOUS at 03:08

## 2023-01-04 RX ADMIN — SODIUM CHLORIDE 30 ML/HR: 9 INJECTION, SOLUTION INTRAVENOUS at 18:31

## 2023-01-04 RX ADMIN — ASPIRIN 81 MG: 81 TABLET, COATED ORAL at 09:24

## 2023-01-04 RX ADMIN — ALBUMIN HUMAN 25 G: 0.25 SOLUTION INTRAVENOUS at 09:32

## 2023-01-04 RX ADMIN — FENTANYL CITRATE 50 MCG: 50 INJECTION, SOLUTION INTRAMUSCULAR; INTRAVENOUS at 08:53

## 2023-01-04 RX ADMIN — PANTOPRAZOLE SODIUM 40 MG: 40 TABLET, DELAYED RELEASE ORAL at 09:24

## 2023-01-04 RX ADMIN — SODIUM CHLORIDE, PRESERVATIVE FREE 10 ML: 5 INJECTION INTRAVENOUS at 09:33

## 2023-01-04 RX ADMIN — OXYCODONE 5 MG: 5 TABLET ORAL at 12:26

## 2023-01-04 RX ADMIN — OXYCODONE 5 MG: 5 TABLET ORAL at 16:40

## 2023-01-04 RX ADMIN — MUPIROCIN: 20 OINTMENT TOPICAL at 20:33

## 2023-01-04 RX ADMIN — ACETAMINOPHEN 650 MG: 325 TABLET ORAL at 22:56

## 2023-01-04 RX ADMIN — ONDANSETRON 4 MG: 2 INJECTION INTRAMUSCULAR; INTRAVENOUS at 02:15

## 2023-01-04 RX ADMIN — ONDANSETRON 4 MG: 2 INJECTION INTRAMUSCULAR; INTRAVENOUS at 10:13

## 2023-01-04 RX ADMIN — CEFAZOLIN 2000 MG: 2 INJECTION, POWDER, FOR SOLUTION INTRAMUSCULAR; INTRAVENOUS at 02:01

## 2023-01-04 RX ADMIN — IPRATROPIUM BROMIDE AND ALBUTEROL SULFATE 1 AMPULE: .5; 2.5 SOLUTION RESPIRATORY (INHALATION) at 08:46

## 2023-01-04 RX ADMIN — SENNOSIDES AND DOCUSATE SODIUM 1 TABLET: 50; 8.6 TABLET ORAL at 20:33

## 2023-01-04 RX ADMIN — OXYCODONE HYDROCHLORIDE 10 MG: 10 TABLET ORAL at 02:00

## 2023-01-04 RX ADMIN — FENTANYL CITRATE 25 MCG: 50 INJECTION, SOLUTION INTRAMUSCULAR; INTRAVENOUS at 00:00

## 2023-01-04 RX ADMIN — SODIUM CHLORIDE, PRESERVATIVE FREE 10 ML: 5 INJECTION INTRAVENOUS at 20:33

## 2023-01-04 RX ADMIN — TAMSULOSIN HYDROCHLORIDE 0.4 MG: 0.4 CAPSULE ORAL at 09:32

## 2023-01-04 RX ADMIN — IPRATROPIUM BROMIDE AND ALBUTEROL SULFATE 1 AMPULE: .5; 2.5 SOLUTION RESPIRATORY (INHALATION) at 12:01

## 2023-01-04 RX ADMIN — IPRATROPIUM BROMIDE AND ALBUTEROL SULFATE 1 AMPULE: .5; 2.5 SOLUTION RESPIRATORY (INHALATION) at 19:48

## 2023-01-04 RX ADMIN — CEFAZOLIN 2000 MG: 2 INJECTION, POWDER, FOR SOLUTION INTRAMUSCULAR; INTRAVENOUS at 18:16

## 2023-01-04 ASSESSMENT — PAIN DESCRIPTION - FREQUENCY: FREQUENCY: CONTINUOUS

## 2023-01-04 ASSESSMENT — PAIN DESCRIPTION - LOCATION
LOCATION: CHEST

## 2023-01-04 ASSESSMENT — PAIN SCALES - GENERAL
PAINLEVEL_OUTOF10: 5
PAINLEVEL_OUTOF10: 8
PAINLEVEL_OUTOF10: 10
PAINLEVEL_OUTOF10: 3
PAINLEVEL_OUTOF10: 7
PAINLEVEL_OUTOF10: 6
PAINLEVEL_OUTOF10: 0
PAINLEVEL_OUTOF10: 5
PAINLEVEL_OUTOF10: 5
PAINLEVEL_OUTOF10: 3
PAINLEVEL_OUTOF10: 6
PAINLEVEL_OUTOF10: 0
PAINLEVEL_OUTOF10: 0
PAINLEVEL_OUTOF10: 6

## 2023-01-04 ASSESSMENT — PAIN DESCRIPTION - ORIENTATION
ORIENTATION: RIGHT
ORIENTATION: MID

## 2023-01-04 ASSESSMENT — PAIN DESCRIPTION - DESCRIPTORS
DESCRIPTORS: ACHING;DISCOMFORT
DESCRIPTORS: ACHING;DISCOMFORT;SORE

## 2023-01-04 ASSESSMENT — PAIN DESCRIPTION - PAIN TYPE: TYPE: SURGICAL PAIN

## 2023-01-04 ASSESSMENT — PAIN - FUNCTIONAL ASSESSMENT: PAIN_FUNCTIONAL_ASSESSMENT: PREVENTS OR INTERFERES SOME ACTIVE ACTIVITIES AND ADLS

## 2023-01-04 ASSESSMENT — PAIN DESCRIPTION - ONSET: ONSET: ON-GOING

## 2023-01-04 NOTE — PLAN OF CARE
Problem: Discharge Planning  Goal: Discharge to home or other facility with appropriate resources  Outcome: Progressing  Flowsheets (Taken 1/3/2023 2000)  Discharge to home or other facility with appropriate resources: Identify barriers to discharge with patient and caregiver     Problem: Pain  Goal: Verbalizes/displays adequate comfort level or baseline comfort level  Outcome: Progressing  Flowsheets (Taken 1/3/2023 2000)  Verbalizes/displays adequate comfort level or baseline comfort level:   Encourage patient to monitor pain and request assistance   Assess pain using appropriate pain scale   Administer analgesics based on type and severity of pain and evaluate response   Implement non-pharmacological measures as appropriate and evaluate response     Problem: ABCDS Injury Assessment  Goal: Absence of physical injury  Outcome: Progressing     Problem: Safety - Adult  Goal: Free from fall injury  Outcome: Progressing     Problem: Skin/Tissue Integrity  Goal: Absence of new skin breakdown  Description: 1. Monitor for areas of redness and/or skin breakdown  2. Assess vascular access sites hourly  3. Every 4-6 hours minimum:  Change oxygen saturation probe site  4. Every 4-6 hours:  If on nasal continuous positive airway pressure, respiratory therapy assess nares and determine need for appliance change or resting period.   Outcome: Progressing

## 2023-01-04 NOTE — PROGRESS NOTES
[address]     Date:2023                                                               Patient Name: Iram Plummer  MRN: [id number]  : 1945  Room: [address] [place] [address]    Evaluating OT: Diamond Dodson OTR/L [id number]    Referring Provider: TITUS Alvarado   Specific Provider Orders/Date: OT eval and treat (1/3/23)      Diagnosis: NSTEMI (non-ST elevated myocardial infarction) ([facility] [address] [I21.4]       Surgery/Procedures: 1/3  Urgent sternotomy/Urgent CABG x 3    Pertinent Medical History:    Past Medical History:   Diagnosis Date    ARJUN (cerebral atherosclerosis)     History of blood transfusion     Hyperlipidemia     Hypertension       *Precautions:  Fall Risk, sternal, O2, chest tubes x 2    Assessment of current deficits   [x]Functional mobility  [x]ADLs [x]Strength  []Cognition  [x]Functional transfers  [x]IADLs [x]Safety Awareness  [x]Endurance  []Fine Motor Coordination  [x]Balance       []Vision/perception  []Sensation    []Gross Motor Coordination [x]ROM  []Delirium                  [] Motor Control     []Communication     OT PLAN OF CARE   OT POC based on physician orders, patient diagnosis and results of clinical assessment.        Frequency/Duration: 1-3 days/wk for 1-2 weeks PRN     Specific OT Treatment to include:   ADL retraining/adapted techniques and AE recommendations to increase functional independence within precautions                    Energy conservation techniques to improve tolerance for selfcare routine   Functional transfer/mobility training/DME recommendations for increased independence, safety and fall prevention         Patient/family education to increase safety and functional independence within precautions             Environmental modifications for safe mobility and completion of ADLs                             Therapeutic activity to improve functional performance during ADLs                                         Therapeutic exercise to improve tolerance and functional strength for ADLs   Balance retraining exercises/tasks for facilitation of postural control with dynamic challenges during ADLs . Recommended Adaptive Equipment:  LB dressing AE pending progress, shower seat for energy conservation. Home Living: Pt lives with granddaughter  in a 2 floor plan with porch +2 step(s) to enter and 1 rail(s); bed/bath on 2nd floor: flight with partial rail  Bathroom setup: tub/shower; higher commode seat  Equipment owned: reacher     Prior Level of Function: IND with ADLs;  IND with IADLs. No device for ambulation. Driving: yes  Occupation: retired ; owned a [employer]     Pain Level: pt c/o 6/10 chest pain  this session    Cognition: A&O: 4/4    Follows 1-2 step commands appropriately. Memory: Good   Comprehension Good   Problem solving: Fair+/Good   Judgement/safety: Fair+/Good               Communication skills: WFL           Vision: WFL               Glasses: yes                                               Hearing: WFL     RASS: 0  CAM-ICU: (NT) Delirium     UE Assessment:  Hand Dominance: Right [x]  Left []     ROM Strength STM goal: PRN   RUE  Grossly WFL within precautions Not formally tested; grossly WFL              WNL for ADLS     LUE Grossly WFL within precautions Not formally tested; grossly WFL              WNL for ADLS       Sensation: No c/o numbness or tingling in extremities   Tone: WNL   Edema: WFL     Functional Assessment: AM-PAC Daily Activity Raw Score: 14/24   Initial Eval Status  Date: 1/4/23 Treatment Status  Date: STG=LTG  Time Frame: 5-7days   Feeding S; set up                       IND  while seated up in chair to increase activity tolerance        Grooming Min A  Set up                       Donnie   while standing sink level demonstrating G tolerance; G balance.      UB dressing/bathing Max A Donnie   demonstrating G knowledge of precautions during tasks     LB dressing/bathing Max A                       Donnie  using AE as needed for safe reach/ energy conservation       Toileting NT                       Donnie     Bed Mobility  Supine to sit: Max A    Sit to supine:   NT                      Min A  in prep of ADL tasks & transfers   Functional Transfers Sit to stand: Min A  from higher bed surface;    Stand to sit: Min A                       Donnie  sit<>stand/functional bathroom transfers using AD/DME as needed for balance and safety   Functional Mobility Min A  no device                        Donnie   functional/bathroom mobility using AD as needed & demonstrating G safety     Balance Sitting:     Static:  SBA    Dynamic:Min A  Standing: Min A  Donnie dynamic sitting balance; Donnie dynamic standing balance  during ADL tasks & transfers   Endurance/  Activity Tolerance   F tolerance with light activity. G   tolerance with moderate activity/self care routine   Visual/  Perceptual               WFL                          Vitals:   HR at rest: 78 bpm HR at end of session: 75 bpm   Spo2 at rest:99% Spo2 at end of session 97%   BP at rest:130/52 mmHg BP at end of session 128/53 mmHg       Treatment: OT intervention provided this date includes:  Functional mobility: Instruction on sternal precautions to facilitate safe bed mobility & functional transfers. Pt required 2 person assist for safe mobility due to complexity of medical condition, medical lines and deconditioning. ADL retraining: Instruction on adapted dressing techniques/equipment (reacher, sock aid, LH sponge) to maintain sternal precautions during ADLs. Pt can benefit from further education. Energy Conservation training: Education on postural awareness/positioning and breathing techniques to improve overall tolerance and participation in self care routine. Pt demonstrated fair tolerance.   Review of recommended DME for fall prevention, bathroom safety & energy conservation. Pt/Family Education: Instruction with handouts on energy conservation and sternal precautions during functional activities for safe return home. Pt/family demonstrates G understanding. Line management and environmental modifications made prior to and end of session to ensure patient safety and to increase efficiency of session. Skilled monitoring of HR, O2 saturation, blood pressure and patient's response to activity performed throughout session. Comments: OK from RN to see patient. Upon arrival, patient supine in bed, agreeable to session. At end of session, patient left seated in chair to increase activity tolerance. Call light within reach, all lines and tubes intact. Pt instructed on use of call light for assistance and fall prevention. Patient presents with decreased activity tolerance, dynamic balance, functional mobility and pain limiting completion of ADLs and safety. Pt can benefit from continued skilled OT to increase safety, functional independence and quality of life. Rehab Potential: Good for established goals    Patient / Family Goal: return to PLOF    Patient and/or family were instructed/educated on diagnosis, prognosis/goals and plan of care. Pt demonstrated G understanding. [] Malnutrition indicators have been identified and nursing has been notified to ensure a dietitian consult is ordered. Evaluation Complexity: Moderate    History: Expanded chart review of consults, imaging, and psychosocial history related to current functional performance. Exam: 5+ performance deficits identified limiting functional independence and safe return home   Assistance/Modification: Min/mod assistance or modifications required to perform tasks. May have comorbidities that affect occupational performance.     Time In:0940              Time Out: 1005       Total Treatment Time: 10          Min Units   OT Eval Low [id number]     OT Eval Medium 80606 X    OT Eval High [id number]     OT Re-Eval [id number]     Therapeutic Ex [id number]     Therapeutic Activities 41681 10 1   ADL/Self Care 85873     Orthotic Management 67691     Neuro Re-Ed 30104     Non-Billable Time       Evaluation time includes thorough review of current medical information, gathering information on past medical history/social history and prior level of function, completion of standardized testing/informal observation of tasks, assessment of data and development of POC/Goals.      Alicia Gustafson, OTR/L [id number]

## 2023-01-04 NOTE — CARE COORDINATION
Pod #1 s/p cabg x3. Met with pt at bedside. She lives in a 2 story home with bed and bath on 2nd floor. Pt's 21 yr old granddaughter, Glenis Frey lives with her. Per pt, Glenis Frey works part time and will likely be taking off to be at home with her when discharged. Pt aware of need to ambulate 400 ft prior to discharging home. Discussed hhc. Pt has no preference for hhc co. Referral made to [facility] of care will be [facility] 1/9. The Plan for Transition of Care is related to the following treatment goals: return to prior level of function    The Patient was provided with a choice of provider and agrees   with the discharge plan. [x] Yes [] No    Freedom of choice list was provided with basic dialogue that supports the patient's individualized plan of care/goals, treatment preferences and shares the quality data associated with the providers.  [x] Yes [] No

## 2023-01-04 NOTE — PROGRESS NOTES
Physical Therapy  Physical Therapy Initial Assessment     Name: Rojelio Latham  :   MRN: [id number]      Date of Service: 2023    Evaluating PT:  Grsi Dowd, PT, DPT [id number]    Room #:  5804/0464-U  Diagnosis:  NSTEMI (non-ST elevated myocardial infarction) [facility]) [I21.4]  PMHx/PSHx:    Past Medical History:   Diagnosis Date    ARJUN (cerebral atherosclerosis)     History of blood transfusion     Hyperlipidemia     Hypertension      Procedure/Surgery:   EGD,  LHC,  BRISEIDA, 1/3 CABG x 3  Precautions:  Falls, Sternal, Chest tube x 2, O2  Equipment Needs:  TBD    SUBJECTIVE:    Pt lives with granddaughter in a 2 story home with 2 stairs to enter and 1 rail. Full flight of steps and 1 rail to bedroom. Pt ambulated without device and was independent PTA. OBJECTIVE:   Initial Evaluation  Date: 23 Treatment Short Term/ Long Term   Goals   AM-PAC 6 Clicks 35/     Was pt agreeable to Eval/treatment? Yes     Does pt have pain?  5/10 surgical pain     Bed Mobility  Rolling: NT  Supine to sit: MaxA with HOB elevated  Sit to supine: NT  Scooting: MaxA  Pilar   Transfers Sit to stand: Pilar  Stand to sit: Pilar  Stand pivot: Pilar no device  Independent   Ambulation   A few steps to chair with Pilar no device  >400 feet Independently   Stair negotiation: ascended and descended NT  >4 steps with 1 rail Mod Independent   ROM BUE:  Defer to OT note  BLE:  WFL     Strength BUE:  Defer to OT note  BLE:  4+/5  Increase by 1/3 MMT grade   Balance Sitting EOB:  SBA  Dynamic Standing:  Pilar no device  Sitting EOB:  Independent  Dynamic Standing:  Independent     Pt is A & O x 4  CAM-ICU: NT  RASS: 0  Sensation:  No reported paresthesias  Edema:  none    Vitals:  Heart Rate at rest 76 bpm Heart Rate post session 71 bpm   SpO2 at rest 99% SpO2 post session 98%   Blood Pressure at rest 134/57 mmHg Blood Pressure post session 127/51 mmHg       Functional Status Score-Intensive Care Unit (FSS-ICU)   Rolling -/7   Supine to sit transfer 2/7   Unsupported sitting  5/7   Sit to stand transfers 4/7   Ambulation 1/7   Total  12/35     Therapeutic Exercises:  NA    Patient education  Pt educated on safety    Patient response to education:   Pt verbalized understanding Pt demonstrated skill Pt requires further education in this area   x x x     ASSESSMENT:    Conditions Requiring Skilled Therapeutic Intervention:    []Decreased strength     []Decreased ROM  [x]Decreased functional mobility  [x]Decreased balance   [x]Decreased endurance   [x]Decreased posture  []Decreased sensation  []Decreased coordination   []Decreased vision  []Decreased safety awareness   [x]Increased pain       Comments:  NP reported pt was medically stable. Pt was in bed upon arrival, agreeable to initial evaluation. Pt was educated on sternal precautions and PLB prior to activity. Increased assistance provided for bed mobility due to deconditioning and precautions. Nausea reported at EOB but no emesis. Pt stood with slight unsteadiness and a posterior lean but able to correct. Shuffled steps completed to chair. Fatigue limited activity. Pt was left in chair with all needs met and call light in reach. All lines remained intact. Treatment:  Patient practiced and was instructed in the following treatment:    Bed mobility training - pt given verbal and tactile cues to facilitate proper sequencing and safety during supine>sit as well as provided with physical assistance. Sitting EOB for >5 minutes for upright tolerance, postural awareness and BLE ROM  Transfer training - pt was given verbal and tactile cues to facilitate proper hand placement, technique and safety during sit to stand and stand to sit as well as provided with physical assistance. Gait training- pt was given verbal and tactile cues to facilitate safety and balance during ambulation as well as provided with physical assistance. Pt's/ family goals   1.  Return home    Prognosis is good for reaching above PT goals. Patient and or family understand(s) diagnosis, prognosis, and plan of care. yes    PHYSICAL THERAPY PLAN OF CARE:    PT POC is established based on physician order and patient diagnosis     Referring provider/PT Order:  TITUS Simon/01/04/23 0600 PT eval and treat  Diagnosis:  NSTEMI (non-ST elevated myocardial infarction) ([facility] [address] [I21.4]  Specific instructions for next treatment:  Progress activity    Current Treatment Recommendations:     [x] Strengthening to improve independence with functional mobility   [] ROM to improve independence with functional mobility   [x] Balance Training to improve static/dynamic balance and to reduce fall risk  [x] Endurance Training to improve activity tolerance during functional mobility   [x] Transfer Training to improve safety and independence with all functional transfers   [x] Gait Training to improve gait mechanics, endurance and asses need for appropriate assistive device  [x] Stair Training in preparation for safe discharge home and/or into the community   [] Positioning to prevent skin breakdown and contractures  [x] Safety and Education Training   [x] Patient/Caregiver Education   [] HEP  [] Other     PT long term treatment goals are located in above grid    Frequency of treatments: 2-5x/week x 1-2 weeks. Time in  0931  Time out  0955    Total Treatment Time  9 minutes     Evaluation Time includes thorough review of current medical information, gathering information on past medical history/social history and prior level of function, completion of standardized testing/informal observation of tasks, assessment of data and education on plan of care and goals.     CPT codes:  [] Low Complexity PT evaluation 01260  [x] Moderate Complexity PT evaluation 62912  [] High Complexity PT evaluation 17232  [] PT Re-evaluation 54330  [] Gait training 15896 - minutes  [] Manual therapy 96269 - minutes  [x] Therapeutic activities 32938 5 minutes  [] Therapeutic exercises 00383 - minutes  [] Neuromuscular reeducation 97125 - minutes     Jack Roldan, PT, DPT  [id number]

## 2023-01-04 NOTE — OP NOTE
[address]                  [address] [facility]  [facility]                                OPERATIVE REPORT    PATIENT NAME: Bernard Juarez                     :        1945  MED REC NO:   [id number]                            ROOM:       56  ACCOUNT NO:   [de-identified]                           ADMIT DATE: 2022  PROVIDER:     Ailin Valero MD    DATE OF PROCEDURE:  2023    PREOPERATIVE DIAGNOSES:  Non-ST-elevation MI, severe multivessel  coronary artery disease, carotid artery stenosis, hypertension,  hyperlipidemia, possible GI bleed. POSTOPERATIVE DIAGNOSES:  Non-ST-elevation MI, severe multivessel  coronary artery disease, carotid artery stenosis, hypertension,  hyperlipidemia, possible GI bleed. INDICATIONS:  Non-ST-elevation MI, severe multivessel coronary artery  disease, carotid artery stenosis, hypertension, hyperlipidemia, possible  GI bleed. SURGEON:  Ailin Valero MD    ASSISTANT:  Anette Hunt NP (no other resident was adequately trained to  be able to assist). Anette Hunt NP, was present assisting throughout  the entire operation from the first incision to the last suture and  every part in between. SECOND ASSISTANT:  TITUS Nino    COMPLICATIONS:  None, tolerated well. ESTIMATED BLOOD LOSS:  Approximately 1000 mL. ANESTHESIA:  General endotracheal.    ANESTHESIA ATTENDING:  Wm Roe MD    SPECIMEN OBTAINED:  None. DRAINS:  Two in the mediastinum, one in the left chest, and one in the  right chest.    OPERATIONS PERFORMED:  1. Urgent sternotomy. 2.  Urgent coronary artery bypass grafting x3, left internal mammary  artery to the LAD, CryoVein to the obtuse marginal, CryoVein to the  right coronary artery. 3.  Left atrial appendage occlusion with a 35-mm AtriClip. 4.  Rigid internal fixation of sternum using [place] plates x2.     HISTORY:  This is a 68-year-old female who was admitted with chest pain  and found to have a non-ST-elevation MI. She underwent a cardiac  catheterization showing severe multivessel coronary artery disease. She  was referred for coronary artery bypass grafting. The patient was  described the procedure in full including risks and complications,  including but not limited to bleeding, infection, need for re-operation,  hemothorax, pneumothorax, stroke, myocardial infarction, and death and  the patient agreed to proceed. FINDINGS:  Preoperative BRISEIDA revealed an ejection fraction around 45%  with mild mitral regurgitation. At times, it was mild-to-moderate  mitral regurgitation. Intraoperatively, left internal mammary artery  was a good conduit for bypass. There was no usable vein in her legs and  CryoVein was adequate conduit for bypass. The LAD was a 2-mm vessel and  good target for bypass. The obtuse marginal was a 2-mm vessel and a  good target for bypass and the right coronary artery was a 1.7-mm vessel  and a fair target for bypass. The patient's CHADS-VASc score was 6,  making her extremely high risk for postoperative atrial fibrillation and  stroke. Therefore, we occluded her left atrial appendage with a 35-mm  AtriClip. The patient was  from cardiopulmonary bypass with  the assistance of a mild amount of inotropic support. Postoperative BRISEIDA  revealed ejection fraction around 50% with no change in the mitral  regurgitation. The patient was transferred to cardiothoracic intensive  care unit in stable but guarded condition. DESCRIPTION OF PROCEDURE:  After adequate informed consent was obtained  and adequate preoperative antibiotics were given, the patient was  brought to the operating room in stable condition. She was laid in  supine position and induced under general endotracheal anesthesia by  Anesthesia staff. A Plascencia catheter and adequate monitoring lines were  placed.   The patient's chest, abdomen, pelvis, and lower extremities  were prepped and draped in the usual sterile fashion. We explored the  left lower extremity greater saphenous vein, which was the largest on  ultrasound and it was not even close to being usable. Therefore,  CryoVein was thawed in the usual fashion. Standard sternotomy was  performed. Left internal mammary artery was harvested in a pedicle  fashion. The patient was systemically heparinized and the mammary was  clipped distally and transected. Excellent pulsatile flow was noted. It was injected with papaverine, clipped distally, and placed in the  left chest.  Standard retractor was placed. The pericardium was entered  and tacked to the chest wall. After ensuring adequate ACT, the patient  was instituted onto a cardiopulmonary bypass by cannulating the  ascending aorta using an 18-Anguillan aortic cannula. Right atrial  appendage was cannulated using a small two-stage venous cannula. Retrograde catheter and root vent were placed. The aorta was  cross-clamped and the heart was arrested with cold blood antegrade and  retrograde Buckberg cardioplegia. Excellent diastolic arrest was noted. Topical ice was used. Cardioplegia was given intermittently throughout  the remainder of the operation. A 35-mm AtriClip was placed at the base  of the left atrial appendage. The CryoVein was brought onto the field,  grafted to the obtuse marginal using 7-0 Prolene. It was brought to the  ascending aorta and a proximal anastomosis was created using 6-0  Prolene. The CryoVein was then grafted to the right coronary artery  using 7-0 Prolene. It was brought to the ascending aorta and a proximal  anastomosis was created using 6-0 Prolene. The mammary artery was then  grafted to the LAD in its midportion using 7-0 Prolene. The pedicle was  tacked to the epicardium. Warm antegrade and retrograde hotshots were  given. The cross-clamp was released. The patient returned to sinus  rhythm. Ventricular pacing wire was placed.   Two drains were placed in  the mediastinum, one in the left chest and one in the right chest.  The  patient was easily weaned from cardiopulmonary bypass with the  assistance of mild amount of inotropic support. Once all bypassed, the  patient was decannulated in the usual fashion. Protamine was given. Mediastinum was inspected for hemostasis. Hemostasis was achieved using  Bovie electrocautery and stitches. With the patient warm,  hemodynamically stable, in sinus rhythm, I closed the chest using #6  steel wires including double wires as well as the [place] plates x2. The remainder of the wound was closed with multiple layers of absorbable  stitch. Dressings were applied over top. The patient tolerated the  procedure well. The patient was transferred to cardiothoracic intensive  care unit in stable, but guarded condition. All sponges, instruments,  and needle counts were correct at the end of the case.         Jeanie Perez MD    D: 01/03/2023 13:15:54       T: 01/03/2023 14:55:47     [email or web address]  Job#: [id number]     Doc#: [id number]    CC:  DO Oli Chan MD Ralph General, MD Erving Jewel, MD Nolan Fenton, MD Emelia Deiters, MD Dutch Abbott, MD Jessica Hoyle, MD

## 2023-01-04 NOTE — PROGRESS NOTES
CVICU Progress Note    Name: Alexi Pruitt  MRN: [id number]    CC: Postoperative Critical Care Management     Indication for Surgery/Procedure: NSTEMI/CAD  LVEF:  40%    RVF:  NML     Important/Relevant PMH/PSH: ICM, HTN, HLD, left carotid stenosis, pulmonary hypertension, small hiatal hernia      Procedure/Surgeries: 1/3/2023 Urgent sternotomy/Urgent CABG x 3 (LIMA-LAD, CryoVein-OM, CryoVein-RCA)/MYNOR exclusion with 35mm AtriClip/Rigid internal fixation of the sternum with [place] plates x 2     Pacing wires: Ventricular         Intake/Output Summary (Last 24 hours) at 1/4/2023 0803  Last data filed at 1/4/2023 0700  Gross per 24 hour   Intake 4351.27 ml   Output 3000 ml   Net 1351.27 ml       Recent Labs     01/02/23  0950 01/03/23  1141 01/03/23  1345 01/04/23  0515 01/04/23  0537   WBC 4.9  --  9.9 12.3*  --    HGB 11.6  --  9.6* 9.1*  --    HCT 34.9   < > 29.5* 28.5* 26.0*     --  204 200  --     < > = values in this interval not displayed. Lab Results   Component Value Date/Time     01/04/2023 05:15 AM    K 4.6 01/04/2023 05:15 AM    K 4.1 12/29/2022 05:15 AM     01/04/2023 05:15 AM    CO2 19 01/04/2023 05:15 AM    BUN 19 01/04/2023 05:15 AM    CREATININE 0.9 01/04/2023 05:15 AM    GLUCOSE 155 01/04/2023 05:15 AM    CALCIUM 9.7 01/04/2023 05:15 AM         Physical Exam:    BP (!) 127/58   Pulse 65   Temp 98.8 °F (37.1 °C) (Core)   Resp 16   Ht 5' 4\" (1.626 m)   Wt 142 lb (64.4 kg)   SpO2 97%   BMI 24.37 kg/m²       CXR Findings: 1/4/2023      FINDINGS:   Heart size is normal.  There are no infiltrates or effusions. Support lines/tubes are appropriate. There has been removal of the ET tube. --  CXR personally viewed and interpreted by ICU Nurse Practitioner, agree with above findings     General: Awake, alert. C/o incisional pain and difficulty sleeping   Eyes: PERRL, anicteric   Pulmonary: Diminished bibasilar.  No wheezes, no accessory muscle use noted on 2L NC  Cardiovascular: RRR, no heaves or thrills on palpation  Tele: SR  Abdomen: Soft, nontender, + BS   Extremities: Palpable pulses all extremities, no edema   Neurologic/Psych: A&Ox3, XIONG to command   Skin: Warm and dry  Incisions: MSI with landry dressing intact       Assessment/Plan: POD #1     1. NSEMI/CAD S/p CABGx3 (LIMA-LAD, CryoVein-OM, CryoVein-RCA)/MYNOR exclusion with 35mm AtriClip  - DAPT, Zocor, BB  - Perioperative Ancef  - Scr stable   - MS chest tubes removed w/o difficulty, tolerated well, pl drains  and placed back to wall sxn (+air leak)  -PT/OT  - Scr stable, 0.9, 25g albumin given for low UOP this am     2. Acute Pulmonary Insufficiency Following Surgery    - Expected 2/2 surgery  - Adequate o/v on 2L NC, encourage IS, ezpap per RT. Ambulate, wean off oxygen as able      3. HFrEF/ICM  -EF~40  - Epi off this morning. CI >2.2. Remove SGC  - Start low dose Toprol; GDMT as BP permits. 4. Acute Post Operative Pain   - PRN oxycodone      5.  Anemia   - EGD 12/27/2022-small hiatal hernia, mild gastritis   -hemoglobin stable 9.1      VTE Prophylaxis: Pharmacologic/Mechanical:  Yes, SCDs   Line infection prevention: Can CVC or arterial line be removed: yes  Continued need for urinary catheter: no, remove prior to transfer     Dispo: Transfer to [facility] signed by Radha Mireles, LUCILLE - CNP on 1/4/2023 at 8:03 AM

## 2023-01-04 NOTE — PROGRESS NOTES
Hospitalist Progress Note      PCP: Yulissa Chandra MD    Date of Admission: 12/24/2022        Hospital Course:  HAD BEEN HAVING CHEST PAIN, AND WAS FOR A HEART CATH AFTER THE 1ST OF THE YEAR.   IT BECAME WORSE AND SHE CAME TO THE ER, FOUND TO HAVE A NSTEMI **    FOR EGD TODAY, DUE TO DECREASING HGB  **IT SHOWED GASTRITIS**  HAS 3 V DISEASE**  CTS SURGERY ON Tuesday,   CABG X 3, STILL HAS CHEST TUBES            Subjective:  POST OP PAIN           Medications:  Reviewed    Infusion Medications    sodium chloride 100 mL/hr (01/04/23 0600)    sodium chloride      propofol Stopped (01/03/23 1430)    sodium chloride      norepinephrine      nitroprusside (NIPRIDE) 50 mg in D5W infusion      insulin Stopped (01/03/23 1346)    dextrose      EPINEPHrine infusion 1 mcg/min (01/04/23 0600)     Scheduled Medications    metoprolol succinate  12.5 mg Oral Daily    sodium chloride flush  5-40 mL IntraVENous 2 times per day    aspirin  81 mg Oral Daily    chlorhexidine  15 mL Mouth/Throat BID    magnesium oxide  400 mg Oral Daily    mupirocin   Nasal BID    sennosides-docusate sodium  1 tablet Oral BID    pantoprazole  40 mg Oral Daily    ceFAZolin (ANCEF) IVPB  2,000 mg IntraVENous Q8H    ipratropium-albuterol  1 ampule Inhalation Q4H WA    insulin lispro  0-4 Units SubCUTAneous Q4H    clopidogrel  75 mg Oral Daily    tamsulosin  0.4 mg Oral Daily    simvastatin  40 mg Oral Nightly     PRN Meds: sodium chloride flush, sodium chloride, ondansetron **OR** ondansetron, oxyCODONE **OR** oxyCODONE, fentanNYL **OR** fentanNYL, propofol, magnesium hydroxide, bisacodyl, potassium chloride, magnesium sulfate, albumin human, sodium chloride, norepinephrine, nitroprusside (NIPRIDE) 50 mg in D5W infusion, glucose, dextrose bolus **OR** dextrose bolus, glucagon (rDNA), dextrose, amiodarone, amiodarone bolus, magnesium sulfate, acetaminophen **OR** acetaminophen      Intake/Output Summary (Last 24 hours) at 1/4/2023 1416  Last data filed at 1/4/2023 1300  Gross per 24 hour   Intake 1771.27 ml   Output 1795 ml   Net -23.73 ml       Exam:    BP (!) 127/58   Pulse 68   Temp 98.5 °F (36.9 °C) (Axillary)   Resp 14   Ht 5' 4\" (1.626 m)   Wt 142 lb (64.4 kg)   SpO2 98%   BMI 24.37 kg/m²       Gen:  WELL DEVELOPED  HEENT: NC/AT, moist mucous membranes,   Neck: supple, trachea midline, no anterior cervical or SC LAD  Heart:  Normal s1/s2, RRR,  Lungs:  DIMINISHED bilaterally,  Abd: bowel sounds present, soft, nontender, nondistended, no masses  Extrem:  No clubbing, cyanosis,   NO  edema  Skin: no rashes or lesions  Psych: A & O x3  Neuro: grossly intact, moves all four extremities. Labs:   Recent Labs     01/02/23  0950 01/03/23  1141 01/03/23  1345 01/04/23  0515 01/04/23  0537   WBC 4.9  --  9.9 12.3*  --    HGB 11.6  --  9.6* 9.1*  --    HCT 34.9   < > 29.5* 28.5* 26.0*     --  204 200  --     < > = values in this interval not displayed. Recent Labs     01/02/23  0950 01/03/23  1141 01/03/23  1302 01/03/23  1345 01/03/23  1348 01/03/23  1850 01/03/23  2315 01/04/23  0515     --   --  137  --   --   --  136   K 4.1   < > 4.0 4.1   < > 3.8 4.2 4.6     --   --  107  --   --   --  105   CO2 23  --   --  21*  --   --   --  19*   BUN 18  --   --  15  --   --   --  19   CREATININE 0.9   < > 0.8 0.8  --   --   --  0.9   CALCIUM 10.3*  --   --  8.3*  --   --   --  9.7    < > = values in this interval not displayed. Recent Labs     01/02/23  0950   AST 15   ALT 19   BILITOT 0.6   ALKPHOS 71     Recent Labs     01/02/23  0950 01/03/23  1345 01/04/23  0515   INR 1.2 1.3 1.1     No results for input(s): Teagan Smack in the last 72 hours. Recent Labs     01/02/23  0950   AST 15   ALT 19   BILITOT 0.6   ALKPHOS 71     No results for input(s): LACTA in the last 72 hours.   No results found for: Kemar Medina  No results found for: AMMONIA    Assessment:    Active Hospital Problems    Diagnosis Date Noted    Acute pulmonary insufficiency [J98.4] 01/03/2023     Priority: Medium    Acute postoperative pain [G89.18] 01/03/2023     Priority: Medium    NSTEMI (non-ST elevated myocardial infarction) ([facility] [address] [I21.4] 12/24/2022     Priority: Medium    Acute heart failure ([facility] [address] [I50.9] 12/24/2022     Priority: Medium    Systolic murmur [I79.2] 11/00/6697     Priority: Medium    EKG, abnormal [R94.31] 12/24/2022     Priority: Medium    Abnormal nuclear stress test [R94.39] 12/24/2022     Priority: Medium    Primary hypertension [I10] 12/24/2022     Priority: Medium    Hyperlipidemia [E78.5] 12/24/2022     Priority: Medium    Carotid artery disease ([facility] [address] [I77.9] 12/24/2022     Priority: Medium    CAD (coronary artery disease) [I25.10] 12/24/2022   CONSTIPATION   ANEMIA( iron def)   S/P CABG X 3    Plan:   SUPPORTIVE CARE  SSI  CORDARONE   ZOCOR  TOPROL      DVT Prophylaxis:  SCD  Diet: ADULT DIET; Regular; 4 carb choices (60 gm/meal); Low Fat/Low Chol/High Fiber/CONSTANCE; No Added Salt (3-4 gm); High Fiber  Code Status: Full Code    PT/OT Eval Status:   WHEN STABLE    Dispo -  HOME       Electronically signed by Lola Carballo DO on 1/4/2023 at 2:16 PM [facility]

## 2023-01-04 NOTE — PROGRESS NOTES
[facility] Cardiology progress note  Niharika Harmon    Patient is seen in follow-up for non-ST elevation MI    Subjective:     [facility] is complaining of sternal wound discomfort  Sitting up in bed no apparent distress    ROS:  CONSTITUTIONAL:  negative for  fevers, chills  HEENT:  negative for earaches, nasal congestion and epistaxis  RESPIRATORY:  negative for  dry cough, cough with sputum,wheezing and hemoptysis  GASTROINTESTINAL:  negative for nausea, vomiting  MUSCULOSKELETAL:  negative for  myalgias, arthralgias  NEUROLOGICAL:  negative for visual disturbance, dysphagia       Scheduled Meds:   metoprolol succinate  12.5 mg Oral Daily    sodium chloride flush  5-40 mL IntraVENous 2 times per day    aspirin  81 mg Oral Daily    chlorhexidine  15 mL Mouth/Throat BID    magnesium oxide  400 mg Oral Daily    mupirocin   Nasal BID    sennosides-docusate sodium  1 tablet Oral BID    pantoprazole  40 mg Oral Daily    ceFAZolin (ANCEF) IVPB  2,000 mg IntraVENous Q8H    ipratropium-albuterol  1 ampule Inhalation Q4H WA    insulin lispro  0-4 Units SubCUTAneous Q4H    clopidogrel  75 mg Oral Daily    tamsulosin  0.4 mg Oral Daily    simvastatin  40 mg Oral Nightly     Continuous Infusions:   sodium chloride 50 mL/hr (01/04/23 1529)    sodium chloride      propofol Stopped (01/03/23 1430)    sodium chloride      norepinephrine      nitroprusside (NIPRIDE) 50 mg in D5W infusion      insulin Stopped (01/03/23 1346)    dextrose      EPINEPHrine infusion Stopped (01/04/23 0711)     PRN Meds:sodium chloride flush, sodium chloride, ondansetron **OR** ondansetron, oxyCODONE **OR** oxyCODONE, fentanNYL **OR** fentanNYL, propofol, magnesium hydroxide, bisacodyl, potassium chloride, magnesium sulfate, albumin human, sodium chloride, norepinephrine, nitroprusside (NIPRIDE) 50 mg in D5W infusion, glucose, dextrose bolus **OR** dextrose bolus, glucagon (rDNA), dextrose, amiodarone, amiodarone bolus, magnesium sulfate, acetaminophen **OR** acetaminophen    I/O last 3 completed shifts: In: 5177.3 [P.O.:300; I.V.:4547.3; Blood:330]  Out: 4000 [Urine:2750; Blood:700; Chest Tube:550]  I/O this shift: In: 748.5 [P.O.:150; I.V.:598.5]  Out: 410 [Urine:235; Chest Tube:175]      Objective:      Physical Exam:   BP (!) 127/58   Pulse 70   Temp 98.4 °F (36.9 °C) (Axillary)   Resp 26   Ht 5' 4\" (1.626 m)   Wt 142 lb (64.4 kg)   SpO2 94%   BMI 24.37 kg/m²   CONSTITUTIONAL:  no apparent distress, and appears stated age  HEAD:  normocepalic, without obvious abnormality, atraumatic  NECK:  Supple, symmetrical, trachea midline, no adenopathy, thyroid symmetric, not enlarged and no tenderness, skin normal  LUNGS:  No increased work of breathing, No accessory muscle use or intercostal retractions, good air exchange, scattered rhonchi  CARDIOVASCULAR:  Normal apical impulse, regular rate and rhythm, normal S1 and S2, no S3 or S4, and no murmur noted, no edema, no JVD, no carotid bruit. ABDOMEN:  Soft, nontender, no masses, no hepatomegaly, no splenomegaly, BS+  MUSCULOSKELETAL:  No clubbing no cyanosis. there is no redness, warmth, or swelling of the joints  full range of motion noted  NEUROLOGIC:  Alert, awake,oriented x3  SKIN:  no bruising or bleeding, normal skin color, texture, turgor and no redness, warmth, or swelling      Cardiographics  I personally reviewed the telemetry monitor strips with the following interpretation: Sinus rhythm    Echocardiogram: 12/26/2022,Summary   The left ventricle is mildly dilated. There is apical wall akinesis, distal septal dyskinesis and distal   inferior wall hypokinesis with thinning corresponding thinning of the   myocardium. Ejection fraction is visually estimated at 40%. There is doppler evidence of stage I diastolic dysfunction. Normal right ventricular size and function. Normal size atria. Mild mitral regurgitation. Mild tricuspid regurgitation.    Moderate pulmonary hypertension. Imaging  XR CHEST PORTABLE   Final Result   No significant change         XR CHEST PORTABLE   Final Result   Postoperative changes with tubes and catheters as noted. Atelectasis/infiltrates and pleural effusion likely CHF and or pneumonia. CT CHEST WO CONTRAST   Final Result   Limited evaluation of vascular structures due to lack of intravenous contrast   however no significant aneurysmal dilatation of the thoracic aorta maximum   transaxial diameter supravalvular ascending portion 3.4 cm with calcific   involvement distal transverse through descending portion which appears   nonaneurysmal as well. Cardiac size enlarged with four-chamber cardiomegaly   demonstrating coronary calcifications. No pericardial effusion      Small bilateral pleural effusions right greater than left with minimal   adjacent atelectasis. US DUP LOWER EXTREMITY MAPPING BILAT VENOUS   Final Result   1. Bilateral venous mapping as described. 2. No superficial venous thrombosis visualized. US CAROTID ARTERY BILATERAL   Final Result   Atherosclerotic disease. Estimated stenosis by NASCET criteria in the proximal right carotid   artery is between 0% to 49% . Estimated stenosis by NASCET criteria in the proximal left carotid   artery is between 50% to 69%. VL LUCIANO BILATERAL LIMITED 1-2 LEVELS   Final Result      XR CHEST PORTABLE   Final Result   Cardiomegaly with  patchy perihilar and bibasilar infiltrates and effusions   likely CHF/edema and or pneumonia. Soft tissue prominence in the right hilum. Consider surveillance preferably   by CT scan.          XR CHEST PORTABLE    (Results Pending)       Lab Review   Lab Results   Component Value Date/Time     01/04/2023 05:15 AM    K 4.6 01/04/2023 05:15 AM    K 4.1 12/29/2022 05:15 AM     01/04/2023 05:15 AM    CO2 19 01/04/2023 05:15 AM    BUN 19 01/04/2023 05:15 AM    CREATININE 0.9 01/04/2023 05:15 AM    GLUCOSE 155 01/04/2023 05:15 AM    CALCIUM 9.7 01/04/2023 05:15 AM     Lab Results   Component Value Date/Time    WBC 12.3 01/04/2023 05:15 AM    HGB 9.1 01/04/2023 05:15 AM    HCT 26.0 01/04/2023 05:37 AM    HCT 28.5 01/04/2023 05:15 AM    MCV 90.8 01/04/2023 05:15 AM     01/04/2023 05:15 AM     I have personally reviewed the laboratory, cardiac diagnostic and radiographic testing as outlined above:    Assessment:     1.  CAD: S/p CABG with LIMA to LAD, CryoVein to OM, CryoVein to RCA, doing fine, will continue current treatment as per CV surgery  2. Ischemic cardiomyopathy  3. Mitral valve regurgitation: Mild  4. Tricuspid valve regurgitation: Mild  5. Pulmonary hypertension: Moderate  6. Hypertension:   7. Hyperlipidemia: On statin    Recommendations:     1. Continue current treatment  2. Basic metabolic panel and CBC in a.m. Discussed with nursing staff  Electronically signed by Derek Jeffery MD on 1/4/2023 at 6:01 PM    NOTE: This report was transcribed using voice recognition software.  Every effort was made to ensure accuracy; however, inadvertent computerized transcription errors may be present

## 2023-01-05 ENCOUNTER — APPOINTMENT (OUTPATIENT)
Dept: GENERAL RADIOLOGY | Age: 78
DRG: 233 | End: 2023-01-05
Payer: MEDICARE

## 2023-01-05 LAB
ANION GAP SERPL CALCULATED.3IONS-SCNC: 10 MMOL/L (ref 7–16)
BUN BLDV-MCNC: 24 MG/DL (ref 6–23)
CALCIUM IONIZED: 1.44 MMOL/L (ref 1.15–1.33)
CALCIUM SERPL-MCNC: 9.9 MG/DL (ref 8.6–10.2)
CHLORIDE BLD-SCNC: 109 MMOL/L (ref 98–107)
CO2: 21 MMOL/L (ref 22–29)
CREAT SERPL-MCNC: 0.8 MG/DL (ref 0.5–1)
GFR SERPL CREATININE-BSD FRML MDRD: >60 ML/MIN/1.73
GLUCOSE BLD-MCNC: 130 MG/DL (ref 74–99)
HCT VFR BLD CALC: 27.5 % (ref 34–48)
HEMOGLOBIN: 8.8 G/DL (ref 11.5–15.5)
MAGNESIUM: 2.6 MG/DL (ref 1.6–2.6)
MCH RBC QN AUTO: 29.9 PG (ref 26–35)
MCHC RBC AUTO-ENTMCNC: 32 % (ref 32–34.5)
MCV RBC AUTO: 93.5 FL (ref 80–99.9)
METER GLUCOSE: 117 MG/DL (ref 74–99)
METER GLUCOSE: 120 MG/DL (ref 74–99)
METER GLUCOSE: 123 MG/DL (ref 74–99)
METER GLUCOSE: 135 MG/DL (ref 74–99)
METER GLUCOSE: 136 MG/DL (ref 74–99)
PDW BLD-RTO: 13.2 FL (ref 11.5–15)
PLATELET # BLD: 167 E9/L (ref 130–450)
PMV BLD AUTO: 10.3 FL (ref 7–12)
POTASSIUM SERPL-SCNC: 4.7 MMOL/L (ref 3.5–5)
RBC # BLD: 2.94 E12/L (ref 3.5–5.5)
SODIUM BLD-SCNC: 140 MMOL/L (ref 132–146)
WBC # BLD: 12.6 E9/L (ref 4.5–11.5)

## 2023-01-05 PROCEDURE — 97535 SELF CARE MNGMENT TRAINING: CPT

## 2023-01-05 PROCEDURE — 99232 SBSQ HOSP IP/OBS MODERATE 35: CPT | Performed by: INTERNAL MEDICINE

## 2023-01-05 PROCEDURE — 6370000000 HC RX 637 (ALT 250 FOR IP): Performed by: NURSE PRACTITIONER

## 2023-01-05 PROCEDURE — 85027 COMPLETE CBC AUTOMATED: CPT

## 2023-01-05 PROCEDURE — 2700000000 HC OXYGEN THERAPY PER DAY

## 2023-01-05 PROCEDURE — 97530 THERAPEUTIC ACTIVITIES: CPT

## 2023-01-05 PROCEDURE — 2580000003 HC RX 258: Performed by: NURSE PRACTITIONER

## 2023-01-05 PROCEDURE — 94640 AIRWAY INHALATION TREATMENT: CPT

## 2023-01-05 PROCEDURE — 2140000000 HC CCU INTERMEDIATE R&B

## 2023-01-05 PROCEDURE — 71045 X-RAY EXAM CHEST 1 VIEW: CPT

## 2023-01-05 PROCEDURE — 2580000003 HC RX 258: Performed by: PHYSICIAN ASSISTANT

## 2023-01-05 PROCEDURE — 82330 ASSAY OF CALCIUM: CPT

## 2023-01-05 PROCEDURE — 6360000002 HC RX W HCPCS: Performed by: PHYSICIAN ASSISTANT

## 2023-01-05 PROCEDURE — 83735 ASSAY OF MAGNESIUM: CPT

## 2023-01-05 PROCEDURE — 82962 GLUCOSE BLOOD TEST: CPT

## 2023-01-05 PROCEDURE — 6370000000 HC RX 637 (ALT 250 FOR IP): Performed by: PHYSICIAN ASSISTANT

## 2023-01-05 PROCEDURE — 80048 BASIC METABOLIC PNL TOTAL CA: CPT

## 2023-01-05 PROCEDURE — 93798 PHYS/QHP OP CAR RHAB W/ECG: CPT

## 2023-01-05 RX ORDER — OXYCODONE HYDROCHLORIDE AND ACETAMINOPHEN 5; 325 MG/1; MG/1
2 TABLET ORAL EVERY 4 HOURS PRN
Status: DISCONTINUED | OUTPATIENT
Start: 2023-01-05 | End: 2023-01-09 | Stop reason: HOSPADM

## 2023-01-05 RX ORDER — ASPIRIN 81 MG/1
81 TABLET ORAL DAILY
Status: DISCONTINUED | OUTPATIENT
Start: 2023-01-05 | End: 2023-01-09 | Stop reason: HOSPADM

## 2023-01-05 RX ORDER — FERROUS SULFATE 325(65) MG
325 TABLET ORAL 2 TIMES DAILY WITH MEALS
Status: DISCONTINUED | OUTPATIENT
Start: 2023-01-05 | End: 2023-01-09 | Stop reason: HOSPADM

## 2023-01-05 RX ORDER — METOPROLOL SUCCINATE 25 MG/1
25 TABLET, EXTENDED RELEASE ORAL DAILY
Status: DISCONTINUED | OUTPATIENT
Start: 2023-01-06 | End: 2023-01-06

## 2023-01-05 RX ORDER — ACETAMINOPHEN 325 MG/1
650 TABLET ORAL EVERY 4 HOURS PRN
Status: DISCONTINUED | OUTPATIENT
Start: 2023-01-05 | End: 2023-01-09 | Stop reason: HOSPADM

## 2023-01-05 RX ORDER — DIPHENHYDRAMINE HCL 25 MG
25 TABLET ORAL EVERY 8 HOURS PRN
Status: DISCONTINUED | OUTPATIENT
Start: 2023-01-05 | End: 2023-01-09 | Stop reason: HOSPADM

## 2023-01-05 RX ORDER — INSULIN LISPRO 100 [IU]/ML
0-6 INJECTION, SOLUTION INTRAVENOUS; SUBCUTANEOUS NIGHTLY
Status: DISCONTINUED | OUTPATIENT
Start: 2023-01-05 | End: 2023-01-07 | Stop reason: ALTCHOICE

## 2023-01-05 RX ORDER — PANTOPRAZOLE SODIUM 40 MG/1
40 TABLET, DELAYED RELEASE ORAL DAILY
Status: DISCONTINUED | OUTPATIENT
Start: 2023-01-05 | End: 2023-01-09 | Stop reason: HOSPADM

## 2023-01-05 RX ORDER — FUROSEMIDE 10 MG/ML
20 INJECTION INTRAMUSCULAR; INTRAVENOUS ONCE
Status: COMPLETED | OUTPATIENT
Start: 2023-01-05 | End: 2023-01-05

## 2023-01-05 RX ORDER — OXYCODONE HYDROCHLORIDE AND ACETAMINOPHEN 5; 325 MG/1; MG/1
1 TABLET ORAL EVERY 4 HOURS PRN
Status: DISCONTINUED | OUTPATIENT
Start: 2023-01-05 | End: 2023-01-09 | Stop reason: HOSPADM

## 2023-01-05 RX ORDER — BISACODYL 10 MG
10 SUPPOSITORY, RECTAL RECTAL DAILY PRN
Status: DISCONTINUED | OUTPATIENT
Start: 2023-01-05 | End: 2023-01-09 | Stop reason: HOSPADM

## 2023-01-05 RX ORDER — ENOXAPARIN SODIUM 100 MG/ML
40 INJECTION SUBCUTANEOUS DAILY
Status: DISCONTINUED | OUTPATIENT
Start: 2023-01-05 | End: 2023-01-08

## 2023-01-05 RX ORDER — FOLIC ACID 1 MG/1
1 TABLET ORAL DAILY
Status: DISCONTINUED | OUTPATIENT
Start: 2023-01-05 | End: 2023-01-09 | Stop reason: HOSPADM

## 2023-01-05 RX ORDER — SENNA AND DOCUSATE SODIUM 50; 8.6 MG/1; MG/1
1 TABLET, FILM COATED ORAL 2 TIMES DAILY
Status: DISCONTINUED | OUTPATIENT
Start: 2023-01-05 | End: 2023-01-09 | Stop reason: HOSPADM

## 2023-01-05 RX ORDER — INSULIN LISPRO 100 [IU]/ML
0-12 INJECTION, SOLUTION INTRAVENOUS; SUBCUTANEOUS
Status: DISCONTINUED | OUTPATIENT
Start: 2023-01-05 | End: 2023-01-07 | Stop reason: ALTCHOICE

## 2023-01-05 RX ORDER — AMIODARONE HYDROCHLORIDE 200 MG/1
400 TABLET ORAL PRN
Status: DISCONTINUED | OUTPATIENT
Start: 2023-01-05 | End: 2023-01-09 | Stop reason: HOSPADM

## 2023-01-05 RX ORDER — ASCORBIC ACID 500 MG
500 TABLET ORAL 2 TIMES DAILY
Status: DISCONTINUED | OUTPATIENT
Start: 2023-01-05 | End: 2023-01-09 | Stop reason: HOSPADM

## 2023-01-05 RX ORDER — MAGNESIUM SULFATE IN WATER 40 MG/ML
2000 INJECTION, SOLUTION INTRAVENOUS PRN
Status: DISCONTINUED | OUTPATIENT
Start: 2023-01-05 | End: 2023-01-09 | Stop reason: HOSPADM

## 2023-01-05 RX ORDER — MORPHINE SULFATE 2 MG/ML
2 INJECTION, SOLUTION INTRAMUSCULAR; INTRAVENOUS EVERY 4 HOURS PRN
Status: DISCONTINUED | OUTPATIENT
Start: 2023-01-05 | End: 2023-01-09 | Stop reason: HOSPADM

## 2023-01-05 RX ORDER — POTASSIUM CHLORIDE 20 MEQ/1
20 TABLET, EXTENDED RELEASE ORAL PRN
Status: DISCONTINUED | OUTPATIENT
Start: 2023-01-05 | End: 2023-01-09 | Stop reason: HOSPADM

## 2023-01-05 RX ADMIN — PANTOPRAZOLE SODIUM 40 MG: 40 TABLET, DELAYED RELEASE ORAL at 10:23

## 2023-01-05 RX ADMIN — IPRATROPIUM BROMIDE AND ALBUTEROL SULFATE 1 AMPULE: .5; 2.5 SOLUTION RESPIRATORY (INHALATION) at 21:55

## 2023-01-05 RX ADMIN — BISACODYL 5 MG: 5 TABLET, COATED ORAL at 10:26

## 2023-01-05 RX ADMIN — ACETAMINOPHEN 650 MG: 325 TABLET ORAL at 05:42

## 2023-01-05 RX ADMIN — IPRATROPIUM BROMIDE AND ALBUTEROL SULFATE 1 AMPULE: .5; 2.5 SOLUTION RESPIRATORY (INHALATION) at 12:42

## 2023-01-05 RX ADMIN — ACETAMINOPHEN 650 MG: 325 TABLET ORAL at 10:24

## 2023-01-05 RX ADMIN — Medication 400 MG: at 10:24

## 2023-01-05 RX ADMIN — SODIUM CHLORIDE, PRESERVATIVE FREE 10 ML: 5 INJECTION INTRAVENOUS at 22:19

## 2023-01-05 RX ADMIN — MUPIROCIN: 20 OINTMENT TOPICAL at 22:20

## 2023-01-05 RX ADMIN — CLOPIDOGREL BISULFATE 75 MG: 75 TABLET ORAL at 10:23

## 2023-01-05 RX ADMIN — OXYCODONE AND ACETAMINOPHEN 2 TABLET: 5; 325 TABLET ORAL at 19:49

## 2023-01-05 RX ADMIN — ASPIRIN 81 MG: 81 TABLET, COATED ORAL at 10:23

## 2023-01-05 RX ADMIN — MAGNESIUM HYDROXIDE 30 ML: 400 SUSPENSION ORAL at 10:26

## 2023-01-05 RX ADMIN — MUPIROCIN: 20 OINTMENT TOPICAL at 13:04

## 2023-01-05 RX ADMIN — CEFAZOLIN 2000 MG: 2 INJECTION, POWDER, FOR SOLUTION INTRAMUSCULAR; INTRAVENOUS at 02:18

## 2023-01-05 RX ADMIN — OXYCODONE 5 MG: 5 TABLET ORAL at 05:41

## 2023-01-05 RX ADMIN — OXYCODONE HYDROCHLORIDE AND ACETAMINOPHEN 500 MG: 500 TABLET ORAL at 22:31

## 2023-01-05 RX ADMIN — FUROSEMIDE 20 MG: 10 INJECTION, SOLUTION INTRAMUSCULAR; INTRAVENOUS at 14:56

## 2023-01-05 RX ADMIN — METOPROLOL SUCCINATE 12.5 MG: 25 TABLET, EXTENDED RELEASE ORAL at 10:24

## 2023-01-05 RX ADMIN — OXYCODONE HYDROCHLORIDE AND ACETAMINOPHEN 500 MG: 500 TABLET ORAL at 10:23

## 2023-01-05 RX ADMIN — TAMSULOSIN HYDROCHLORIDE 0.4 MG: 0.4 CAPSULE ORAL at 10:24

## 2023-01-05 RX ADMIN — SENNOSIDES AND DOCUSATE SODIUM 1 TABLET: 50; 8.6 TABLET ORAL at 10:23

## 2023-01-05 RX ADMIN — FERROUS SULFATE TAB 325 MG (65 MG ELEMENTAL FE) 325 MG: 325 (65 FE) TAB at 16:55

## 2023-01-05 RX ADMIN — SENNOSIDES AND DOCUSATE SODIUM 1 TABLET: 50; 8.6 TABLET ORAL at 22:19

## 2023-01-05 RX ADMIN — OXYCODONE AND ACETAMINOPHEN 1 TABLET: 5; 325 TABLET ORAL at 14:57

## 2023-01-05 RX ADMIN — FERROUS SULFATE TAB 325 MG (65 MG ELEMENTAL FE) 325 MG: 325 (65 FE) TAB at 10:23

## 2023-01-05 RX ADMIN — ENOXAPARIN SODIUM 40 MG: 100 INJECTION SUBCUTANEOUS at 14:56

## 2023-01-05 RX ADMIN — SODIUM CHLORIDE, PRESERVATIVE FREE 10 ML: 5 INJECTION INTRAVENOUS at 14:57

## 2023-01-05 RX ADMIN — FOLIC ACID 1 MG: 1 TABLET ORAL at 10:24

## 2023-01-05 RX ADMIN — SODIUM CHLORIDE, PRESERVATIVE FREE 10 ML: 5 INJECTION INTRAVENOUS at 10:22

## 2023-01-05 ASSESSMENT — PAIN DESCRIPTION - LOCATION
LOCATION: CHEST
LOCATION: INCISION
LOCATION: CHEST;HEAD
LOCATION: CHEST
LOCATION: HEAD
LOCATION: HEAD

## 2023-01-05 ASSESSMENT — PAIN SCALES - GENERAL
PAINLEVEL_OUTOF10: 6
PAINLEVEL_OUTOF10: 6
PAINLEVEL_OUTOF10: 5
PAINLEVEL_OUTOF10: 2
PAINLEVEL_OUTOF10: 4
PAINLEVEL_OUTOF10: 6
PAINLEVEL_OUTOF10: 6
PAINLEVEL_OUTOF10: 8
PAINLEVEL_OUTOF10: 7
PAINLEVEL_OUTOF10: 0

## 2023-01-05 ASSESSMENT — PAIN DESCRIPTION - DESCRIPTORS
DESCRIPTORS: DISCOMFORT;SORE;TENDER
DESCRIPTORS: DISCOMFORT;SORE;TENDER
DESCRIPTORS: SORE;DISCOMFORT;TENDER
DESCRIPTORS: SORE;DISCOMFORT;TENDER

## 2023-01-05 ASSESSMENT — PAIN DESCRIPTION - PAIN TYPE
TYPE: ACUTE PAIN
TYPE: ACUTE PAIN;SURGICAL PAIN

## 2023-01-05 ASSESSMENT — PAIN - FUNCTIONAL ASSESSMENT: PAIN_FUNCTIONAL_ASSESSMENT: ACTIVITIES ARE NOT PREVENTED

## 2023-01-05 ASSESSMENT — PAIN DESCRIPTION - ORIENTATION
ORIENTATION: MID
ORIENTATION: POSTERIOR;ANTERIOR
ORIENTATION: MID
ORIENTATION: MID

## 2023-01-05 ASSESSMENT — PAIN DESCRIPTION - FREQUENCY
FREQUENCY: INTERMITTENT
FREQUENCY: INTERMITTENT

## 2023-01-05 NOTE — PROGRESS NOTES
Hospitalist Progress Note      PCP: Adria Leon MD    Date of Admission: 12/24/2022        Hospital Course:  HAD BEEN HAVING CHEST PAIN, AND WAS FOR A HEART CATH AFTER THE 1ST OF THE YEAR.   IT BECAME WORSE AND SHE CAME TO THE ER, FOUND TO HAVE A NSTEMI **    FOR EGD TODAY, DUE TO DECREASING HGB  **IT SHOWED GASTRITIS**  HAS 3 V DISEASE**  CTS SURGERY ON Tuesday,   CABG X 3,          Subjective:   HAVING POST OP PAIN           Medications:  Reviewed    Infusion Medications    dextrose       Scheduled Medications    aspirin  81 mg Oral Daily    bisacodyl  5 mg Oral Daily    sennosides-docusate sodium  1 tablet Oral BID    magnesium hydroxide  30 mL Oral Daily    ferrous sulfate  325 mg Oral BID WC    vitamin C  500 mg Oral BID    folic acid  1 mg Oral Daily    pantoprazole  40 mg Oral Daily    insulin lispro  0-12 Units SubCUTAneous TID WC    insulin lispro  0-6 Units SubCUTAneous Nightly    [START ON 1/6/2023] metoprolol succinate  25 mg Oral Daily    enoxaparin  40 mg SubCUTAneous Daily    sodium chloride flush  5-40 mL IntraVENous 2 times per day    magnesium oxide  400 mg Oral Daily    mupirocin   Nasal BID    ipratropium-albuterol  1 ampule Inhalation Q4H WA    clopidogrel  75 mg Oral Daily    simvastatin  40 mg Oral Nightly     PRN Meds: acetaminophen, oxyCODONE-acetaminophen **OR** oxyCODONE-acetaminophen, potassium chloride, bisacodyl, diphenhydrAMINE, amiodarone, amiodarone bolus, magnesium sulfate, morphine, sodium chloride, trimethobenzamide, sodium chloride flush, glucose, dextrose bolus **OR** dextrose bolus, glucagon (rDNA), dextrose      Intake/Output Summary (Last 24 hours) at 1/5/2023 1518  Last data filed at 1/5/2023 1409  Gross per 24 hour   Intake 1694.99 ml   Output 850 ml   Net 844.99 ml       Exam:    BP (!) 130/57   Pulse 74   Temp 98.7 °F (37.1 °C) (Temporal)   Resp 19   Ht 5' 4\" (1.626 m)   Wt 143 lb 3.2 oz (65 kg)   SpO2 90% Comment: lowest while walking, ended at 92% BMI 24.58 kg/m²       Gen:  WELL DEVELOPED  HEENT: NC/AT, moist mucous membranes,   Neck: supple, trachea midline, no anterior cervical or SC LAD  Heart:  Normal s1/s2, RRR,  Lungs:  DIMINISHED bilaterally,  Abd: bowel sounds present, soft, nontender, nondistended, no masses  Extrem:  No clubbing, cyanosis,   NO  edema  Skin: no rashes or lesions  Psych: A & O x3  Neuro: grossly intact, moves all four extremities. Labs:   Recent Labs     01/03/23  1345 01/04/23  0515 01/04/23  0537 01/05/23  0420   WBC 9.9 12.3*  --  12.6*   HGB 9.6* 9.1*  --  8.8*   HCT 29.5* 28.5* 26.0* 27.5*    200  --  167     Recent Labs     01/03/23  1345 01/03/23  1348 01/03/23  2315 01/04/23  0515 01/05/23  0420     --   --  136 140   K 4.1   < > 4.2 4.6 4.7     --   --  105 109*   CO2 21*  --   --  19* 21*   BUN 15  --   --  19 24*   CREATININE 0.8  --   --  0.9 0.8   CALCIUM 8.3*  --   --  9.7 9.9    < > = values in this interval not displayed. No results for input(s): AST, ALT, BILIDIR, BILITOT, ALKPHOS in the last 72 hours. Recent Labs     01/03/23  1345 01/04/23  0515   INR 1.3 1.1     No results for input(s): [place] in the last 72 hours. No results for input(s): AST, ALT, ALB, BILIDIR, BILITOT, ALKPHOS in the last 72 hours. No results for input(s): LACTA in the last 72 hours.   No results found for: [place]  No results found for: AMMONIA    Assessment:    Active Hospital Problems    Diagnosis Date Noted    Acute pulmonary insufficiency [J98.4] 01/03/2023     Priority: Medium    Acute postoperative pain [G89.18] 01/03/2023     Priority: Medium    NSTEMI (non-ST elevated myocardial infarction) ([facility] [I21.4] 12/24/2022     Priority: Medium    Acute heart failure ([facility] [I50.9] 12/24/2022     Priority: Medium    Systolic murmur [K34.9] 07/17/5482     Priority: Medium    EKG, abnormal [R94.31] 12/24/2022     Priority: Medium    Abnormal nuclear stress test [R94.39] 12/24/2022 Priority: Medium    Primary hypertension [I10] 12/24/2022     Priority: Medium    Hyperlipidemia [E78.5] 12/24/2022     Priority: Medium    Carotid artery disease ([address] [I77.9] 12/24/2022     Priority: Medium    CAD (coronary artery disease) [I25.10] 12/24/2022   CONSTIPATION   ANEMIA( iron def)   S/P CABG X 3     Plan:   SUPPORTIVE CARE  SSI  CORDARONE   ZOCOR  TOPROL        DVT Prophylaxis:  SCD  Diet: ADULT DIET; Regular; 4 carb choices (60 gm/meal); Low Fat/Low Chol/High Fiber/CONSTANCE; No Added Salt (3-4 gm); High Fiber  Code Status: Full Code     PT/OT Eval Status:   WHEN STABLE     Dispo -  HOME       Electronically signed by Vilma Dominique DO on 1/5/2023 at 3:18 PM [facility]

## 2023-01-05 NOTE — PATIENT CARE CONFERENCE
P Quality Flow/Interdisciplinary Rounds Progress Note        Quality Flow Rounds held on January 5, 2023    Disciplines Attending:  Bedside Nurse, , , and Nursing Unit Leadership    Ki Solitario was admitted on 12/24/2022  2:40 PM    Anticipated Discharge Date:       Disposition:    Erlin Score:  Erlin Scale Score: 19    Readmission Risk              Risk of Unplanned Readmission:  14           Discussed patient goal for the day, patient clinical progression, and barriers to discharge.   The following Goal(s) of the Day/Commitment(s) have been identified:  Activity Progression  increase      Delmi Ybarra RN  January 5, 2023

## 2023-01-05 NOTE — DISCHARGE INSTR - DIET
Therapeutic lifestyle changes, or TLC, is a three-part program that addresses diet, physical activity, and weight management. The TLC program can help you reduce the bad cholesterol (low-density lipoprotein, or LDL) and raise the good cholesterol (high-density lipoprotein, or HDL) in your blood. The TLC program may be recommended to you after a coronary artery bypass graft or angioplasty. Keys to TLC      Limit saturated fats and trans fats:  Foods high in saturated fats include fatty meat, poultry skin, jarrett, sausage, whole milk, cream, and butter. Trans fats are found in stick margarine, shortening, some fried foods, and packaged foods made with hydrogenated oils. Instead of butter or stick margarine, try reduced-fat, whipped, or liquid spreads. Limit the amount of cholesterol that you eat to less than 200 milligrams (mg) per day. Foods high in cholesterol include egg yolks (one egg yolk has about 184 mg of cholesterol), fatty meat, whole milk, cheese, shrimp, lobster, and crab. Eat more omega-3 fats (heart-healthy fats):  Good choices include salmon, tuna, mackerel, and sardines. Aim to eat fish twice a week. Other foods with omega-3 fats include walnuts and canola and soybean oils. Flaxseed is another source of omega-3 fats. Have it as flaxseed oil or ground flaxseed. Limit the total amount of fat that you eat (including heart-healthy fats) to 25% to 35% of the calories that you eat. If you should eat 2,000 calories per day, your fat intake can be between 50 and 75 grams (g) per day. Get 20 to 30 g of dietary fiber per day:  Fruits, vegetables, whole grains, and dried beans are good sources of fiber:  Aim for 5 cups of fruits and vegetables per day. Have 3 ounces (oz) of whole grain foods every day. Plan to eat more plant-based meals, using beans and soy foods for protein. Talk with your health care team about what types of physical activity are best for you.  Plan to get about 30 minutes of exercise on most days.   Foods Recommended  Food Group Foods Recommended   Grains Whole grain breads and cereals, including oats and barley  Pasta, especially whole wheat or other whole grain types  Brown rice  Low-fat crackers and pretzels   Vegetables Fresh, frozen, or canned vegetables without added fat or salt   Fruits Fresh, frozen, canned, or dried fruit   Milk, Milk Products, and Milk Alternatives Fat-free (skim) or low-fat (1%) milk or buttermilk  Nonfat or low-fat yogurt or cottage cheese  Fat-free and low-fat cheese  Fortified, unsweetened soy milk   Meat and Other Protein Foods Lean cuts of beef and pork (loin, leg, round, extra-lean hamburger)  Skinless poultry  Fish  Venison and other wild game  Dried beans and peas  Nuts and nut butters  Meat alternatives made with soy or vegetable protein (pea protein, seitan, tofu)  Egg whites or egg substitute  Cold cuts made with lean meat or soy protein   Fats and Oils Unsaturated oils (olive, peanut, soy, sunflower, canola)  Soft or liquid margarines and vegetable oil spreads  Salad dressings  Seeds and nuts  Avocado   Foods Not Recommended  Food Group Foods Not Recommended   Grains High-fat bakery products, such as doughnuts, biscuits, croissants, Portuguese pastries, pies, cookies  Snacks made with partially hydrogenated oils, including chips, cheese puffs, snack mixes, regular crackers, butter-flavored popcorn   Vegetables Fried vegetables  Vegetables prepared with butter, cheese, or cream sauce   Fruits Fried fruits  Fruits served with butter or cream   Milk and Milk Products Whole milk  Reduced-fat (2%) milk  Whole milk yogurt or ice cream  Cream  Half-and-half  Cream cheese  Sour cream  Cheese   Meat and Other Protein Foods Higher-fat cuts of meats (ribs, T-bone steak, regular hamburger)  [place]  Sausage  Cold cuts, such as salami or bologna  Corned beef  Hot dogs  Organ meats (liver, brains, sweetbreads)  Poultry with skin  [employer], poultry, and fish  Whole eggs and egg yolks   Fats and Oils Butter  Stick margarine  Shortening  Partially hydrogenated oils  Tropical oils (coconut, palm, palm kernel oils)     Cardiac Nutrition (TLC) Sample 1-Day Menu  Breakfast 1/2 cup apple juice  3/4 cup oatmeal  1 cup fat-free milk  1 small banana  1 cup brewed coffee   Lunch 2 slices whole-wheat bread  2 oz lean deli turkey breast  1 oz low-fat Swiss cheese  2 slices tomato  2 lettuce leaves  1 pear  1 cup nonfat milk   Afternoon Snack 1 oz trail mix (with nuts, seeds, raisins)  1 cup blueberries  1 cup nonfat milk   Evening Meal 3 oz broiled fish  1 cup brown rice  1 tsp margarine  1 medium stalk broccoli  1 medium carrot  1/8 cup chickpeas, for salad  1 tablespoon olive oil and vinegar dressing  1 small whole-wheat roll  1 tsp margarine  1/2 cup nonfat frozen yogurt  1/4 cup blueberries, with frozen yogurt  1 cup tea     RD contact # for further questions: [phone]

## 2023-01-05 NOTE — PROGRESS NOTES
Occupational Therapy  OT BEDSIDE TREATMENT NOTE   [facility] [address] [facility]  [address] [place] [address]        [id number]  Patient Name: Usha Deluca  MRN: [id number]  : 1945  Room: [address] [place], [place] [id number]     Per OT Eval:    Evaluating OT: Stew Cabrera OTR/L [id number]     Referring Provider: TITUS Christianson   Specific Provider Orders/Date: OT eval and treat (1/3/23)        Diagnosis: NSTEMI (non-ST elevated myocardial infarction) ([facility] [address] [I21.4]        Surgery/Procedures: 1/3  Urgent sternotomy/Urgent CABG x 3     Pertinent Medical History:    Past Medical History        Past Medical History:   Diagnosis Date    ARJUN (cerebral atherosclerosis)      History of blood transfusion      Hyperlipidemia      Hypertension           *Precautions:  Fall Risk, sternal, O2, chest tubes x 2     Assessment of current deficits   [x]Functional mobility            [x]ADLs           [x]Strength                  []Cognition  [x]Functional transfers          [x]IADLs          [x]Safety Awareness   [x]Endurance  []Fine Motor Coordination   [x]Balance       []Vision/perception    []Sensation      []Gross Motor Coordination [x]ROM           []Delirium                  [] Motor Control     []Communication      OT PLAN OF CARE   OT POC based on physician orders, patient diagnosis and results of clinical assessment.         Frequency/Duration: 1-3 days/wk for 1-2 weeks PRN     Specific OT Treatment to include:   ADL retraining/adapted techniques and AE recommendations to increase functional independence within precautions                    Energy conservation techniques to improve tolerance for selfcare routine   Functional transfer/mobility training/DME recommendations for increased independence, safety and fall prevention         Patient/family education to increase safety and functional independence within precautions             Environmental modifications for safe mobility and completion of ADLs                             Therapeutic activity to improve functional performance during ADLs                                         Therapeutic exercise to improve tolerance and functional strength for ADLs   Balance retraining exercises/tasks for facilitation of postural control with dynamic challenges during ADLs . Recommended Adaptive Equipment:  LB dressing AE pending progress, shower seat for energy conservation. Home Living: Pt lives with granddaughter  in a 2 floor plan with porch +2 step(s) to enter and 1 rail(s); bed/bath on 2nd floor: flight with partial rail  Bathroom setup: tub/shower; higher commode seat  Equipment owned: reacher      Prior Level of Function: IND with ADLs;  IND with IADLs. No device for ambulation. Driving: yes  Occupation: retired ; owned a [employer]      Pain Level: Pt complained of headache pain this session     Cognition: A&O: 4/4    Follows 1-2 step commands appropriately.               Memory: Good              Comprehension Good              Problem solving: Fair+/Good              Judgement/safety: Fair+/Good                 Communication skills: WFL              Vision: WFL                     Glasses: yes                                                    Hearing: WFL                RASS: 0  CAM-ICU: (NT) Delirium      UE Assessment:  Hand Dominance: Right [x]  Left []       ROM Strength STM goal: PRN   RUE  Grossly WFL within precautions Not formally tested; grossly WFL              WNL for ADLS      LUE Grossly WFL within precautions Not formally tested; grossly WFL              WNL for ADLS         Sensation: No c/o numbness or tingling in extremities   Tone: WNL   Edema: [facility]     Functional Assessment: AM-PAC Daily Activity Raw Score: 14/24    Initial Eval Status  Date: 1/4/23 Treatment Status  Date: 1/5/23 STG=LTG  Time Frame: 5-7days   Feeding S; set up Setup                      IND  while seated up in chair to increase activity tolerance         Grooming Min A  Set up Ezequiel  Pt washed face, applied deodorant, combed hair seated upright in chair                       Donnie   while standing sink level demonstrating G tolerance; G balance. UB dressing/bathing Max A modA-dressing  James/[facility] gown seated EOB, assistance to manage of lines/tubes    Ezequiel-dressing  Simulated Task                       Donnie   demonstrating G knowledge of precautions during tasks      LB dressing/bathing Max A maxA-dressing  James/[facility] socks seated upright in chair, with pt attempting cross over technique, having difficulty  DNT pants this date    maxA-bathing  Simulated Task                       Donnie  using AE as needed for safe reach/ energy conservation        Toileting NT DNT   Pt denying need to this session                      Donnie      Bed Mobility  Supine to sit: Max A     Sit to supine:   NT modA  Supine<>EOB    HOB slightly elevated                      Min A  in prep of ADL tasks & transfers   Functional Transfers Sit to stand: Min A  from higher bed surface;     Stand to sit: Min A  Ezequiel  Sit to Stand  Stand to Sit    Cueing for hand placement to maintain of sternal precautions                      Donnie  sit<>stand/functional bathroom transfers using AD/DME as needed for balance and safety   Functional Mobility Min A  no device  Ezeuqiel  Pt ambulated short household distance in room EOB<>Doorway with HHA, slow pace, rest breaks as needed                       Donnie   functional/bathroom mobility using AD as needed & demonstrating G safety      Balance Sitting:     Static:  SBA    Dynamic:Min A  Standing: Min A  Sitting EOB:  SBA    Standing:  Ezequiel Donnie dynamic sitting balance; Donnie dynamic standing balance  during ADL tasks & transfers   Endurance/  Activity Tolerance    F tolerance with light activity.     Poor+ G   tolerance with moderate activity/self care routine   Visual/  Perceptual               [facility] Education:  Pt was educated on role of OT, goals to be reached, importance of OOB activity, precautions to follow, safety and hand placement with transfers, safety/balance with functional mobility, energy conservation techniques and techniques to assist with LB dressing task    Comments: Upon arrival pt supine in bed, agreeable to therapy, speaking with nursing okaying pt to be seen this session. Pt completed of bed mobility, functional mobility, transfers and light ADL tasks this session. Pt being easily fatigued throughout session, having of poor activity tolerance, requiring of rest breaks throughout session. Monitoring of pt's O2 on 1L being 91-94% while ambulating and at rest. At end of session, pt seated upright in chair, visitors becoming present, all lines and tubes intact, call light within reach. Pt has made slow progress towards set goals.    Continue with current plan of care focusing on increasing of independency with transfers and ADL tasks      Treatment Time In: 11:15am           Treatment Time Out: 11:38am                Treatment Charges: Mins Units   Ther Ex  [id number]     Manual Therapy [id number]     Thera Activities 19660 15 1   ADL/Home Mgt 11001 8 1   Neuro Re-ed [id number]     Group Therapy      Orthotic manage/training  [id number]     Non-Billable Time     Total Timed Treatment 23 2        Aleta Butler TAVAREZ/L [id number]

## 2023-01-05 NOTE — PROGRESS NOTES
Comprehensive Nutrition Assessment    Type and Reason for Visit:  Reassess    Nutrition Recommendations/Plan:     Continue Current Diet, Start Oral Nutrition Supplement (open heart ONS regimen)       Malnutrition Assessment:  Malnutrition Status: At risk for malnutrition (01/05/23 1218)    Context:  Acute Illness     Findings of the 6 clinical characteristics of malnutrition:  Energy Intake:  50% or less of estimated energy requirements for 5 or more days (average)  Weight Loss:  Unable to assess (wt fluctuations/ unit changes since adm)     Body Fat Loss:  No significant fat loss  Muscle Mass Loss: Now significant muscle loss  Fluid Accumulation:  No significant fluid accumulation     Strength:  Not Performed    Nutrition Assessment:    Pt just transferred to general floor post-op CABG. Admit w/ severe CAD s/p heart cath. PO intake appears decreased ~25-50% meals. Will add Glucerna TID & Vito BID to aid in wound healing & recovery. Nutrition Related Findings:    Pt alert, +I/O's, no edema, hypoactive BS, CT x 2 Wound Type: Multiple, Surgical Incision (x 2)       Current Nutrition Intake & Therapies:    Average Meal Intake: 26-50% (average)  Average Supplements Intake: None Ordered  ADULT DIET; Regular; 4 carb choices (60 gm/meal); Low Fat/Low Chol/High Fiber/CONSTANCE    Anthropometric Measures:  Height: 5' 4\" (162.6 cm)  Ideal Body Weight (IBW): 120 lbs (55 kg)    Admission Body Weight: 154 lb (69.9 kg) (12/24 first measured standing scale)  Current Body Weight: 143 lb (64.9 kg) (1/5 actual. possible 7% wt loss x 2 weeks), 121.1 % IBW. Current BMI (kg/m2): 24.5  Usual Body Weight: 159 lb (72.1 kg) (1/13/22 measured EMR wt x 1 year ago)  % Weight Change (Calculated): -10.1, unable to assess wt change in acute time frame                 BMI Categories: Normal Weight (BMI 18.5-24. 9)    Estimated Daily Nutrient Needs:  Energy Requirements Based On: Formula  Weight Used for Energy Requirements: Current  Energy (kcal/day): MSJ 1131 x 1.3 SF= 1928-2029  Weight Used for Protein Requirements: Current  Protein (g/day): 1.5-1.8 g/kg CBW;   Method Used for Fluid Requirements: 1 ml/kcal  Fluid (ml/day): 5355-2416    Nutrition Diagnosis:   Increased nutrient needs related to increase demand for energy/nutrients (s/p open heart) as evidenced by wounds (surgial)    Nutrition Interventions:   Nutrition Education/Counseling: Education initiated (Heart Healthy diet handouts/ sample menu provided for discharge w/ RD contact #)    Coordination of Nutrition Care: Continue to monitor while inpatient       Goals:  Previous Goal Met: No Progress toward Goal(s)  Goals: PO intake 75% or greater, by next RD assessment       Nutrition Monitoring and Evaluation:   Food/Nutrient Intake Outcomes: Food and Nutrient Intake, Supplement Intake  Physical Signs/Symptoms Outcomes: Biochemical Data, Nutrition Focused Physical Findings, Skin, Weight, Chewing or Swallowing, GI Status, Fluid Status or Edema    Discharge Planning:     Too soon to determine     Rohan Silva RD, LD  Contact: Ext [phone]

## 2023-01-05 NOTE — PROGRESS NOTES
[facility] Cardiology progress note  Lilly Oden    Patient is seen in follow-up for non-ST elevation MI    Subjective:     Ms. Jose Ye feels better today   Sitting up in bed no apparent distress    ROS:  CONSTITUTIONAL:  negative for  fevers, chills  HEENT:  negative for earaches, nasal congestion and epistaxis  RESPIRATORY:  negative for  dry cough, cough with sputum,wheezing and hemoptysis  GASTROINTESTINAL:  negative for nausea, vomiting  MUSCULOSKELETAL:  negative for  myalgias, arthralgias  NEUROLOGICAL:  negative for visual disturbance, dysphagia       Scheduled Meds:   aspirin  81 mg Oral Daily    bisacodyl  5 mg Oral Daily    sennosides-docusate sodium  1 tablet Oral BID    magnesium hydroxide  30 mL Oral Daily    ferrous sulfate  325 mg Oral BID WC    vitamin C  500 mg Oral BID    folic acid  1 mg Oral Daily    pantoprazole  40 mg Oral Daily    insulin lispro  0-12 Units SubCUTAneous TID WC    insulin lispro  0-6 Units SubCUTAneous Nightly    metoprolol succinate  12.5 mg Oral Daily    sodium chloride flush  5-40 mL IntraVENous 2 times per day    magnesium oxide  400 mg Oral Daily    mupirocin   Nasal BID    ipratropium-albuterol  1 ampule Inhalation Q4H WA    clopidogrel  75 mg Oral Daily    tamsulosin  0.4 mg Oral Daily    simvastatin  40 mg Oral Nightly     Continuous Infusions:   dextrose       PRN Meds:acetaminophen, oxyCODONE-acetaminophen **OR** oxyCODONE-acetaminophen, potassium chloride, bisacodyl, diphenhydrAMINE, amiodarone, amiodarone bolus, magnesium sulfate, morphine, sodium chloride, trimethobenzamide, sodium chloride flush, glucose, dextrose bolus **OR** dextrose bolus, glucagon (rDNA), dextrose    I/O last 3 completed shifts: In: 3384.2 [P.O.:670; I.V.:2714.2]  Out: 1930 [Urine:1280; Chest Tube:650]  I/O this shift:   In: 48 [P.O.:50]  Out: 0       Objective:      Physical Exam:   BP (!) 130/57   Pulse 74   Temp 98.7 °F (37.1 °C) (Temporal)   Resp 19   Ht 5' 4\" (1.626 m)   Wt 143 lb 3.2 oz (65 kg)   SpO2 90% Comment: lowest while walking, ended at 92%  BMI 24.58 kg/m²   CONSTITUTIONAL:  no apparent distress, and appears stated age  HEAD:  normocepalic, without obvious abnormality, atraumatic  NECK:  Supple, symmetrical, trachea midline, no adenopathy, thyroid symmetric, not enlarged and no tenderness, skin normal  LUNGS:  No increased work of breathing, No accessory muscle use or intercostal retractions, good air exchange, scattered rhonchi  CARDIOVASCULAR:  Normal apical impulse, regular rate and rhythm, normal S1 and S2, no S3 or S4, and no murmur noted, no edema, no JVD, no carotid bruit. ABDOMEN:  Soft, nontender, no masses, no hepatomegaly, no splenomegaly, BS+  MUSCULOSKELETAL:  No clubbing no cyanosis. there is no redness, warmth, or swelling of the joints  full range of motion noted  NEUROLOGIC:  Alert, awake,oriented x3  SKIN:  no bruising or bleeding, normal skin color, texture, turgor and no redness, warmth, or swelling      Cardiographics  I personally reviewed the telemetry monitor strips with the following interpretation: Sinus rhythm    Echocardiogram: 12/26/2022,Summary   The left ventricle is mildly dilated. There is apical wall akinesis, distal septal dyskinesis and distal   inferior wall hypokinesis with thinning corresponding thinning of the   myocardium. Ejection fraction is visually estimated at 40%. There is doppler evidence of stage I diastolic dysfunction. Normal right ventricular size and function. Normal size atria. Mild mitral regurgitation. Mild tricuspid regurgitation. Moderate pulmonary hypertension. Imaging  XR CHEST PORTABLE   Final Result   1. Interval removal of right IJ central venous sheath and catheter, no   complication. 2.  Pulmonary venous hypertension and slight interstitial edema. Trace   pleural effusions suspected. 3.  Stable mediastinal and pleural drains.          XR CHEST PORTABLE   Final Result   No significant change         XR CHEST PORTABLE   Final Result   Postoperative changes with tubes and catheters as noted. Atelectasis/infiltrates and pleural effusion likely CHF and or pneumonia. CT CHEST WO CONTRAST   Final Result   Limited evaluation of vascular structures due to lack of intravenous contrast   however no significant aneurysmal dilatation of the thoracic aorta maximum   transaxial diameter supravalvular ascending portion 3.4 cm with calcific   involvement distal transverse through descending portion which appears   nonaneurysmal as well. Cardiac size enlarged with four-chamber cardiomegaly   demonstrating coronary calcifications. No pericardial effusion      Small bilateral pleural effusions right greater than left with minimal   adjacent atelectasis. US DUP LOWER EXTREMITY MAPPING BILAT VENOUS   Final Result   1. Bilateral venous mapping as described. 2. No superficial venous thrombosis visualized. US CAROTID ARTERY BILATERAL   Final Result   Atherosclerotic disease. Estimated stenosis by NASCET criteria in the proximal right carotid   artery is between 0% to 49% . Estimated stenosis by NASCET criteria in the proximal left carotid   artery is between 50% to 69%. VL LUCIANO BILATERAL LIMITED 1-2 LEVELS   Final Result      XR CHEST PORTABLE   Final Result   Cardiomegaly with  patchy perihilar and bibasilar infiltrates and effusions   likely CHF/edema and or pneumonia. Soft tissue prominence in the right hilum. Consider surveillance preferably   by CT scan.          XR CHEST PORTABLE    (Results Pending)       Lab Review   Lab Results   Component Value Date/Time     01/05/2023 04:20 AM    K 4.7 01/05/2023 04:20 AM    K 4.1 12/29/2022 05:15 AM     01/05/2023 04:20 AM    CO2 21 01/05/2023 04:20 AM    BUN 24 01/05/2023 04:20 AM    CREATININE 0.8 01/05/2023 04:20 AM    GLUCOSE 130 01/05/2023 04:20 AM    CALCIUM 9.9 01/05/2023 04:20 AM     Lab Results Component Value Date/Time    WBC 12.6 01/05/2023 04:20 AM    HGB 8.8 01/05/2023 04:20 AM    HCT 27.5 01/05/2023 04:20 AM    HCT 26.0 01/04/2023 05:37 AM    MCV 93.5 01/05/2023 04:20 AM     01/05/2023 04:20 AM     I have personally reviewed the laboratory, cardiac diagnostic and radiographic testing as outlined above:    Assessment:     1.  CAD: S/p CABG with LIMA to LAD, CryoVein to OM, CryoVein to RCA, doing fine, will continue current treatment   2. Ischemic cardiomyopathy  3. Mitral valve regurgitation: Mild  4. Tricuspid valve regurgitation: Mild  5. Pulmonary hypertension: Moderate  6. Hypertension:   7. Hyperlipidemia: On statin    Recommendations:     1. Continue current treatment  2. Basic metabolic panel and CBC in a.m. Discussed with patient  Electronically signed by Nickie Dominguez MD on 1/5/2023 at 12:20 PM    NOTE: This report was transcribed using voice recognition software.  Every effort was made to ensure accuracy; however, inadvertent computerized transcription errors may be present

## 2023-01-05 NOTE — PROGRESS NOTES
Nurse to nurse report called at this time. Pt's son, Toni Martinez, updated on pt's transfer to [facility].

## 2023-01-05 NOTE — PROGRESS NOTES
POD#2 Awake, alert. No complaints. Denies CP, palpitations, SOB at rest, dizziness/lightheadedness. Vitals:    01/05/23 0819 01/05/23 0830 01/05/23 1117 01/05/23 1130   BP: 121/60 (!) 140/77 (!) 130/57    Pulse: 69 73 74    Resp: 20 18 19    Temp: 97.4 °F (36.3 °C) 99 °F (37.2 °C) 98.7 °F (37.1 °C)    TempSrc: Temporal Temporal Temporal    SpO2: 93% 95% 93% 90%   Weight:       Height:         O2: 2L/NC      Intake/Output Summary (Last 24 hours) at 1/5/2023 1409  Last data filed at 1/5/2023 1306  Gross per 24 hour   Intake 1694.99 ml   Output 880 ml   Net 814.99 ml       +BM pre-op    UO: 430mL/8hr   CT output: Left Pleural: 150mL/8hr (290mL/24hrs); Right Pleural 10ml/8hr (45mL/24hrs)      Recent Labs     01/03/23  1345 01/04/23  0515 01/04/23  0537 01/05/23  0420   WBC 9.9 12.3*  --  12.6*   HGB 9.6* 9.1*  --  8.8*   HCT 29.5* 28.5* 26.0* 27.5*    200  --  167      Recent Labs     01/03/23  1345 01/04/23  0515 01/05/23  0420   BUN 15 19 24*   CREATININE 0.8 0.9 0.8       Telemetry: SR      PE  Cardiac: RRR  Lungs: decreased bases  Chest incision with intact EFRAÍN DSD. Sternum stable. Prior chest tube site incisions C/D/I, no erythema with intact sutures. Chest tubes x 2 and Epicardial pacing wires present and secure. Abd: Soft, nontender, +BS  Ext: Incisions C/D/I, approximated, no erythema, + edema      A/P: POD#2      1. CAD/NSTEMI  --Stable s/p CABG x3 on (LIMA-LAD, CryoVein-OM, CryoVein-RCA)/MYNOR exclusion with 35mm AtriClip on 1/3/2023  --Post op BRISEIDA reveals 50% EF on epinephrine  --will order TTE day before or day of discharge to establish baseline and potential need for WCD on discharge  --Scr stable  --DAPT/statin/BB  --reinforce sternal precautions  --continue epicardial pacing wires  --chest tubes without significant output and without airleaks. Continue chest tubes today, will likely remove tomorrow.         2. Expected acute blood loss anemia secondary to open heart surgery  --stable; hgb 8.8      3. NSR  --continue BB with hold parameters - Toprol XL 25mg         4. Expected acute pulmonary insufficiency in the setting following surgery  --wean oxygen to keep SpO2 greater than or equal to 92%  --continue duonebs with ezpap  --encourage C&DB, SMI  --currently on 2L O2/NC  --diurese       5. HFrEF/ICM  - EF~40; required epi post-op  - Toprol XL 25mg   - GDMT as BP permits. 6. Constipation--expected delayed return of bowel function  --secondary to anesthesia, narcotics, decreased oral intake, and decreased physical activity   --no BM since prior to surgery  --Continue daily MOM/oral bisacodyl until +BM and senna-s as scheduled. --Will give daily suppository until +BM starting POD 3 if no result by then   --Encouraged continued increase in oral intake and activity.          7. Expected deconditioning in the setting following surgery  --Increase activity as tolerated  --PT/OT             DVT prophylaxis:  --continue bilateral knee high FABIOLA hose  --continue PCDs  --continue progressive ambulation  --Add lovenox for dvt prophylaxis and continue knee high FABIOLA hose/pcds/progressive ambulation      Dispo: home vs. Rehab pending progression in activity level        This patient's case and care plan was discussed with the attending surgeon

## 2023-01-06 PROBLEM — I97.89 POSTOPERATIVE ATRIAL FIBRILLATION (HCC): Status: ACTIVE | Noted: 2023-01-06

## 2023-01-06 PROBLEM — I48.91 POSTOPERATIVE ATRIAL FIBRILLATION (HCC): Status: ACTIVE | Noted: 2023-01-06

## 2023-01-06 LAB
ANION GAP SERPL CALCULATED.3IONS-SCNC: 11 MMOL/L (ref 7–16)
BLOOD BANK DISPENSE STATUS: NORMAL
BLOOD BANK DISPENSE STATUS: NORMAL
BLOOD BANK PRODUCT CODE: NORMAL
BLOOD BANK PRODUCT CODE: NORMAL
BPU ID: NORMAL
BPU ID: NORMAL
BUN BLDV-MCNC: 20 MG/DL (ref 6–23)
CALCIUM SERPL-MCNC: 10 MG/DL (ref 8.6–10.2)
CHLORIDE BLD-SCNC: 104 MMOL/L (ref 98–107)
CO2: 24 MMOL/L (ref 22–29)
CREAT SERPL-MCNC: 0.7 MG/DL (ref 0.5–1)
DESCRIPTION BLOOD BANK: NORMAL
DESCRIPTION BLOOD BANK: NORMAL
GFR SERPL CREATININE-BSD FRML MDRD: >60 ML/MIN/1.73
GLUCOSE BLD-MCNC: 95 MG/DL (ref 74–99)
HCT VFR BLD CALC: 28.5 % (ref 34–48)
HEMOGLOBIN: 9 G/DL (ref 11.5–15.5)
LV EF: 48 %
LVEF MODALITY: NORMAL
MAGNESIUM: 2.5 MG/DL (ref 1.6–2.6)
MCH RBC QN AUTO: 29.8 PG (ref 26–35)
MCHC RBC AUTO-ENTMCNC: 31.6 % (ref 32–34.5)
MCV RBC AUTO: 94.4 FL (ref 80–99.9)
METER GLUCOSE: 108 MG/DL (ref 74–99)
METER GLUCOSE: 113 MG/DL (ref 74–99)
METER GLUCOSE: 121 MG/DL (ref 74–99)
METER GLUCOSE: 125 MG/DL (ref 74–99)
PDW BLD-RTO: 13.1 FL (ref 11.5–15)
PLATELET # BLD: 205 E9/L (ref 130–450)
PMV BLD AUTO: 10.5 FL (ref 7–12)
POTASSIUM SERPL-SCNC: 4.7 MMOL/L (ref 3.5–5)
RBC # BLD: 3.02 E12/L (ref 3.5–5.5)
SODIUM BLD-SCNC: 139 MMOL/L (ref 132–146)
WBC # BLD: 11.2 E9/L (ref 4.5–11.5)

## 2023-01-06 PROCEDURE — 93306 TTE W/DOPPLER COMPLETE: CPT

## 2023-01-06 PROCEDURE — 82962 GLUCOSE BLOOD TEST: CPT

## 2023-01-06 PROCEDURE — 6370000000 HC RX 637 (ALT 250 FOR IP): Performed by: NURSE PRACTITIONER

## 2023-01-06 PROCEDURE — 2580000003 HC RX 258

## 2023-01-06 PROCEDURE — 85027 COMPLETE CBC AUTOMATED: CPT

## 2023-01-06 PROCEDURE — 2140000000 HC CCU INTERMEDIATE R&B

## 2023-01-06 PROCEDURE — 99232 SBSQ HOSP IP/OBS MODERATE 35: CPT | Performed by: INTERNAL MEDICINE

## 2023-01-06 PROCEDURE — 6370000000 HC RX 637 (ALT 250 FOR IP): Performed by: PHYSICIAN ASSISTANT

## 2023-01-06 PROCEDURE — 6360000002 HC RX W HCPCS: Performed by: PHYSICIAN ASSISTANT

## 2023-01-06 PROCEDURE — 6370000000 HC RX 637 (ALT 250 FOR IP)

## 2023-01-06 PROCEDURE — 80048 BASIC METABOLIC PNL TOTAL CA: CPT

## 2023-01-06 PROCEDURE — 2580000003 HC RX 258: Performed by: NURSE PRACTITIONER

## 2023-01-06 PROCEDURE — 6360000002 HC RX W HCPCS: Performed by: NURSE PRACTITIONER

## 2023-01-06 PROCEDURE — 94640 AIRWAY INHALATION TREATMENT: CPT

## 2023-01-06 PROCEDURE — 93798 PHYS/QHP OP CAR RHAB W/ECG: CPT

## 2023-01-06 PROCEDURE — 36415 COLL VENOUS BLD VENIPUNCTURE: CPT

## 2023-01-06 PROCEDURE — 83735 ASSAY OF MAGNESIUM: CPT

## 2023-01-06 PROCEDURE — 6360000002 HC RX W HCPCS

## 2023-01-06 PROCEDURE — 2700000000 HC OXYGEN THERAPY PER DAY

## 2023-01-06 RX ORDER — BUMETANIDE 1 MG/1
1 TABLET ORAL DAILY
Status: DISCONTINUED | OUTPATIENT
Start: 2023-01-06 | End: 2023-01-09 | Stop reason: HOSPADM

## 2023-01-06 RX ORDER — SIMVASTATIN 40 MG
20 TABLET ORAL NIGHTLY
Status: DISCONTINUED | OUTPATIENT
Start: 2023-01-06 | End: 2023-01-09 | Stop reason: HOSPADM

## 2023-01-06 RX ORDER — METOPROLOL SUCCINATE 25 MG/1
37.5 TABLET, EXTENDED RELEASE ORAL 2 TIMES DAILY
Status: DISCONTINUED | OUTPATIENT
Start: 2023-01-06 | End: 2023-01-09 | Stop reason: HOSPADM

## 2023-01-06 RX ADMIN — OXYCODONE HYDROCHLORIDE AND ACETAMINOPHEN 500 MG: 500 TABLET ORAL at 21:05

## 2023-01-06 RX ADMIN — MUPIROCIN: 20 OINTMENT TOPICAL at 08:32

## 2023-01-06 RX ADMIN — SODIUM CHLORIDE, PRESERVATIVE FREE 10 ML: 5 INJECTION INTRAVENOUS at 21:05

## 2023-01-06 RX ADMIN — DIPHENHYDRAMINE HYDROCHLORIDE 25 MG: 25 TABLET ORAL at 22:35

## 2023-01-06 RX ADMIN — IPRATROPIUM BROMIDE AND ALBUTEROL SULFATE 1 AMPULE: .5; 2.5 SOLUTION RESPIRATORY (INHALATION) at 14:28

## 2023-01-06 RX ADMIN — FERROUS SULFATE TAB 325 MG (65 MG ELEMENTAL FE) 325 MG: 325 (65 FE) TAB at 08:32

## 2023-01-06 RX ADMIN — SENNOSIDES AND DOCUSATE SODIUM 1 TABLET: 50; 8.6 TABLET ORAL at 08:34

## 2023-01-06 RX ADMIN — BISACODYL 5 MG: 5 TABLET, COATED ORAL at 08:18

## 2023-01-06 RX ADMIN — BUMETANIDE 1 MG: 1 TABLET ORAL at 12:41

## 2023-01-06 RX ADMIN — OXYCODONE AND ACETAMINOPHEN 2 TABLET: 5; 325 TABLET ORAL at 17:38

## 2023-01-06 RX ADMIN — FERROUS SULFATE TAB 325 MG (65 MG ELEMENTAL FE) 325 MG: 325 (65 FE) TAB at 17:42

## 2023-01-06 RX ADMIN — MUPIROCIN: 20 OINTMENT TOPICAL at 21:14

## 2023-01-06 RX ADMIN — IPRATROPIUM BROMIDE AND ALBUTEROL SULFATE 1 AMPULE: .5; 2.5 SOLUTION RESPIRATORY (INHALATION) at 21:27

## 2023-01-06 RX ADMIN — OXYCODONE HYDROCHLORIDE AND ACETAMINOPHEN 500 MG: 500 TABLET ORAL at 08:18

## 2023-01-06 RX ADMIN — CLOPIDOGREL BISULFATE 75 MG: 75 TABLET ORAL at 08:17

## 2023-01-06 RX ADMIN — AMIODARONE HYDROCHLORIDE 0.5 MG/MIN: 50 INJECTION, SOLUTION INTRAVENOUS at 17:46

## 2023-01-06 RX ADMIN — METOPROLOL SUCCINATE 25 MG: 25 TABLET, EXTENDED RELEASE ORAL at 08:17

## 2023-01-06 RX ADMIN — PANTOPRAZOLE SODIUM 40 MG: 40 TABLET, DELAYED RELEASE ORAL at 08:34

## 2023-01-06 RX ADMIN — SODIUM CHLORIDE, PRESERVATIVE FREE 10 ML: 5 INJECTION INTRAVENOUS at 08:34

## 2023-01-06 RX ADMIN — AMIODARONE HYDROCHLORIDE 0.5 MG/MIN: 50 INJECTION, SOLUTION INTRAVENOUS at 22:45

## 2023-01-06 RX ADMIN — OXYCODONE AND ACETAMINOPHEN 2 TABLET: 5; 325 TABLET ORAL at 06:03

## 2023-01-06 RX ADMIN — METOPROLOL SUCCINATE 37.5 MG: 25 TABLET, EXTENDED RELEASE ORAL at 21:05

## 2023-01-06 RX ADMIN — ASPIRIN 81 MG: 81 TABLET, COATED ORAL at 08:18

## 2023-01-06 RX ADMIN — SENNOSIDES AND DOCUSATE SODIUM 1 TABLET: 50; 8.6 TABLET ORAL at 21:05

## 2023-01-06 RX ADMIN — ENOXAPARIN SODIUM 40 MG: 100 INJECTION SUBCUTANEOUS at 08:32

## 2023-01-06 RX ADMIN — FOLIC ACID 1 MG: 1 TABLET ORAL at 08:33

## 2023-01-06 RX ADMIN — DEXTROSE MONOHYDRATE 150 MG: 50 INJECTION, SOLUTION INTRAVENOUS at 08:36

## 2023-01-06 RX ADMIN — Medication 400 MG: at 08:33

## 2023-01-06 RX ADMIN — MAGNESIUM HYDROXIDE 30 ML: 400 SUSPENSION ORAL at 08:33

## 2023-01-06 RX ADMIN — AMIODARONE HYDROCHLORIDE 1 MG/MIN: 50 INJECTION, SOLUTION INTRAVENOUS at 12:42

## 2023-01-06 ASSESSMENT — PAIN SCALES - GENERAL
PAINLEVEL_OUTOF10: 0
PAINLEVEL_OUTOF10: 5
PAINLEVEL_OUTOF10: 0
PAINLEVEL_OUTOF10: 10
PAINLEVEL_OUTOF10: 7
PAINLEVEL_OUTOF10: 0
PAINLEVEL_OUTOF10: 5

## 2023-01-06 ASSESSMENT — PAIN DESCRIPTION - LOCATION
LOCATION: CHEST
LOCATION: INCISION;CHEST
LOCATION: CHEST

## 2023-01-06 ASSESSMENT — PAIN DESCRIPTION - DESCRIPTORS
DESCRIPTORS: DISCOMFORT;SORE;TENDER
DESCRIPTORS: ACHING;DISCOMFORT;PRESSURE

## 2023-01-06 ASSESSMENT — PAIN DESCRIPTION - ORIENTATION
ORIENTATION: MID

## 2023-01-06 ASSESSMENT — PAIN DESCRIPTION - PAIN TYPE
TYPE: SURGICAL PAIN;ACUTE PAIN
TYPE: SURGICAL PAIN;ACUTE PAIN

## 2023-01-06 ASSESSMENT — PAIN - FUNCTIONAL ASSESSMENT: PAIN_FUNCTIONAL_ASSESSMENT: ACTIVITIES ARE NOT PREVENTED

## 2023-01-06 NOTE — PROGRESS NOTES
POD#3 Awake, alert. No complaints other than palpitations. Denies CP, SOB at rest, dizziness/lightheadedness. Vitals:    01/06/23 0715 01/06/23 0737 01/06/23 0749 01/06/23 0817   BP: 139/80  117/64    Pulse: (!) 104 (!) 148 74 (!) 134   Resp: 18  18    Temp: 97 °F (36.1 °C)  98.3 °F (36.8 °C)    TempSrc: Temporal  Oral    SpO2: 95%  93%    Weight:       Height:         O2: 1L/NC 93%      Intake/Output Summary (Last 24 hours) at 1/6/2023 1018  Last data filed at 1/6/2023 0835  Gross per 24 hour   Intake 0 ml   Output 1400 ml   Net -1400 ml         +BM pre-op    UO: voiding well without difficulty  CT output: Left Pleural: 80mL/8hr (390mL/24hrs); Right Pleural 10ml/8hr (60mL/24hrs)      Recent Labs     01/04/23  0515 01/04/23  0537 01/05/23  0420 01/06/23  0542   WBC 12.3*  --  12.6* 11.2   HGB 9.1*  --  8.8* 9.0*   HCT 28.5* 26.0* 27.5* 28.5*     --  167 205        Recent Labs     01/04/23  0515 01/05/23  0420 01/06/23  0542   BUN 19 24* 20   CREATININE 0.9 0.8 0.7         Telemetry: Afib RVR      PE  Cardiac: iRRR, tachycardia   Lungs: decreased bases  Chest incision with intact EFRAÍN DSD. Sternum stable. Prior chest tube site incisions C/D/I, no erythema with intact sutures. Chest tubes x 2 and Epicardial pacing wires present and secure. Abd: Soft, nontender, +BS  Ext: Incisions C/D/I, approximated, no erythema, + edema      A/P: POD#3      1. CAD/NSTEMI  --Stable s/p CABG x3 on (LIMA-LAD, CryoVein-OM, CryoVein-RCA)/MYNOR exclusion with 35mm AtriClip on 1/3/2023  --Post op BRISEIDA reveals 50% EF on epinephrine  --will order TTE day before or day of discharge to establish baseline and potential need for WCD on discharge - ordered 1/6  --Scr stable  --ASA/statin/BB - plavix on hold for possible eliquis  --reinforce sternal precautions  --continue epicardial pacing wires  --chest tubes without significant output and without airleaks. Chest tubes removed without difficulty, patient tolerated well.         2. Expected acute blood loss anemia secondary to open heart surgery  --stable; hgb 9.0      3. Post Op Afib  --continue BB with hold parameters - increase Toprol XL to 37.5mg BID  --received amio bolus, continue gtt for 24 hrs  --Start PO amio tomorrow         4. Expected acute pulmonary insufficiency in the setting following surgery  --wean oxygen to keep SpO2 greater than or equal to 92%  --continue duonebs with ezpap  --encourage C&DB, SMI  --currently on 2L O2/NC  --diurese PO bumex      5. HFrEF/ICM  --EF~40; required epi post-op  --Toprol XL 37.5mg BID  --GDMT as BP permits. --PO bumex      6. Constipation--expected delayed return of bowel function  --secondary to anesthesia, narcotics, decreased oral intake, and decreased physical activity   --no BM since prior to surgery  --Continue daily MOM/oral bisacodyl until +BM and senna-s as scheduled. --Will give daily suppository until +BM starting POD 3 if no result by then   --Encouraged continued increase in oral intake and activity.          7. Expected deconditioning in the setting following surgery  --Increase activity as tolerated  --PT/OT          DVT prophylaxis:  --continue bilateral knee high FABIOLA hose  --continue PCDs  --continue progressive ambulation  --lovenox for dvt prophylaxis and continue knee high FABIOLA hose/pcds/progressive ambulation      Dispo: home vs. Rehab pending progression in activity level        This patient's case and care plan was discussed with the attending surgeon

## 2023-01-06 NOTE — PROGRESS NOTES
Hospitalist Progress Note      PCP: Nelda Nunes MD    Date of Admission: 12/24/2022        Hospital Course:  HAD BEEN HAVING CHEST PAIN, AND WAS FOR A HEART CATH AFTER THE 1ST OF THE YEAR. IT BECAME WORSE AND SHE CAME TO THE ER, FOUND TO HAVE A NSTEMI **    FOR EGD TODAY, DUE TO DECREASING HGB  **IT SHOWED GASTRITIS**  HAS 3 V DISEASE**  CTS SURGERY ON Tuesday,   CABG X 3,          Subjective:   Alert and oriented lying in bed on examination.   No acute events overnight  Continue amiodarone drip  Denies any chest pain, shortness of breath  Currently on room air          Medications:  Reviewed    Infusion Medications    amiodarone 450mg/250ml D5W infusion 1 mg/min (01/06/23 1242)    Followed by    amiodarone 450mg/250ml D5W infusion      dextrose       Scheduled Medications    simvastatin  20 mg Oral Nightly    bumetanide  1 mg Oral Daily    metoprolol succinate  37.5 mg Oral BID    aspirin  81 mg Oral Daily    bisacodyl  5 mg Oral Daily    sennosides-docusate sodium  1 tablet Oral BID    magnesium hydroxide  30 mL Oral Daily    ferrous sulfate  325 mg Oral BID WC    vitamin C  500 mg Oral BID    folic acid  1 mg Oral Daily    pantoprazole  40 mg Oral Daily    insulin lispro  0-12 Units SubCUTAneous TID WC    insulin lispro  0-6 Units SubCUTAneous Nightly    enoxaparin  40 mg SubCUTAneous Daily    sodium chloride flush  5-40 mL IntraVENous 2 times per day    magnesium oxide  400 mg Oral Daily    mupirocin   Nasal BID    ipratropium-albuterol  1 ampule Inhalation Q4H WA    [Held by provider] clopidogrel  75 mg Oral Daily     PRN Meds: perflutren lipid microspheres, acetaminophen, oxyCODONE-acetaminophen **OR** oxyCODONE-acetaminophen, potassium chloride, bisacodyl, diphenhydrAMINE, amiodarone, magnesium sulfate, morphine, sodium chloride, trimethobenzamide, sodium chloride flush, glucose, dextrose bolus **OR** dextrose bolus, glucagon (rDNA), dextrose      Intake/Output Summary (Last 24 hours) at 1/6/2023 1405  Last data filed at 1/6/2023 1336  Gross per 24 hour   Intake 0 ml   Output 1500 ml   Net -1500 ml       Exam:    /68   Pulse 98   Temp 98.2 °F (36.8 °C) (Temporal)   Resp 18   Ht 5' 4\" (1.626 m)   Wt 152 lb 9.6 oz (69.2 kg)   SpO2 92%   BMI 26.19 kg/m²       Gen:  WELL DEVELOPED  HEENT: NC/AT, moist mucous membranes,   Neck: supple, trachea midline, no anterior cervical or SC LAD  Heart:  Normal s1/s2, RRR,  Lungs:  DIMINISHED bilaterally,  Abd: bowel sounds present, soft, nontender, nondistended, no masses  Extrem:  No clubbing, cyanosis,   NO  edema  Skin: no rashes or lesions  Psych: A & O x3  Neuro: grossly intact, moves all four extremities. Labs:   Recent Labs     01/04/23 0515 01/04/23  0537 01/05/23  0420 01/06/23  0542   WBC 12.3*  --  12.6* 11.2   HGB 9.1*  --  8.8* 9.0*   HCT 28.5* 26.0* 27.5* 28.5*     --  167 205     Recent Labs     01/04/23  0515 01/05/23  0420 01/06/23  0542    140 139   K 4.6 4.7 4.7    109* 104   CO2 19* 21* 24   BUN 19 24* 20   CREATININE 0.9 0.8 0.7   CALCIUM 9.7 9.9 10.0     No results for input(s): AST, ALT, BILIDIR, BILITOT, ALKPHOS in the last 72 hours. Recent Labs     01/04/23  0515   INR 1.1     No results for input(s): [place] in the last 72 hours. No results for input(s): AST, ALT, ALB, BILIDIR, BILITOT, ALKPHOS in the last 72 hours. No results for input(s): LACTA in the last 72 hours.   No results found for: Brandon Dawn  No results found for: AMMONIA    Assessment:    Active Hospital Problems    Diagnosis Date Noted    Acute pulmonary insufficiency [J98.4] 01/03/2023     Priority: Medium    Acute postoperative pain [G89.18] 01/03/2023     Priority: Medium    NSTEMI (non-ST elevated myocardial infarction) ([facility] [I21.4] 12/24/2022     Priority: Medium    Acute heart failure ([facility] [I50.9] 12/24/2022     Priority: Medium    Systolic murmur [K30.1] 21/97/6571     Priority: Medium    EKG, abnormal [R94.31] 12/24/2022     Priority: Medium    Abnormal nuclear stress test [R94.39] 12/24/2022     Priority: Medium    Primary hypertension [I10] 12/24/2022     Priority: Medium    Hyperlipidemia [E78.5] 12/24/2022     Priority: Medium    Carotid artery disease ([address] [I77.9] 12/24/2022     Priority: Medium    CAD (coronary artery disease) [I25.10] 12/24/2022   CONSTIPATION   ANEMIA( iron def)   S/P CABG X 3     Plan:   SUPPORTIVE CARE  SSI  CORDARONE   ZOCOR  TOPROL    1/6/2023  FABIOLA hose bilateral  Lovenox for DVT prophylaxis  Amiodarone drip IV  Postop day #3-CABG-1/3  Glucose levels within normal limits        DVT Prophylaxis:  SCD  Diet: ADULT DIET; Regular; 4 carb choices (60 gm/meal); Low Fat/Low Chol/High Fiber/CONSTANCE; No Added Salt (3-4 gm); High Fiber  Code Status: Full Code     PT/OT Eval Status:   WHEN STABLE     Dispo -  HOME       Electronically signed by LUCILLE Watkins CNP on 1/6/2023 at 2:05 PM [facility]

## 2023-01-06 NOTE — CARE COORDINATION
POD#3 CABG x 3. CTs removed. Wires cont. RA. IV amio. Met with pt in room. Plan remains home with her family. Confirmed with pt she will have family with her 24/7 for 1-2 weeks. Pt ambulated 60 ft today and is aware she will need to ambulate 400ft prior to dc. Went over [facility] protocol with pt. Voiced no other needs for home at present. Confirmed with Ashley with [facility] they are following pt. Sw/cm will follow.

## 2023-01-06 NOTE — PLAN OF CARE
Patient states pain has lessen after chest tubes removed.     [facility] encouraged

## 2023-01-06 NOTE — DISCHARGE INSTR - COC
Continuity of Care Form    Patient Name: Rosina Montaño   :    MRN:  [id number]    Admit date:  2022  Discharge date:  ***    Code Status Order: Full Code   Advance Directives:   Advance Care Flowsheet Documentation       Date/Time Healthcare Directive Type of Healthcare Directive Copy in [address] Agent's Name Healthcare Agent's Phone Number    23 0600 Yes, patient has an advance directive for healthcare treatment Durable power of  for health care No, copy requested from other (See comment) Adult Children John Damian [phone]    22 1442 Yes, patient has an advance directive for healthcare treatment Durable power of  for health care No, copy requested from other (See comment) Adult Children Pinky [facility] [phone]            Admitting Physician:  Lethaniel Frankel, DO  PCP: Yarelis Christensen MD    Discharging Nurse: [facility] Unit/Room#: 9673/6979-Z  Discharging Unit Phone Number: ***    Emergency Contact:   Extended Emergency Contact Information  Primary Emergency Contact: Shalini SPRINGER  Home Phone: [phone]  Relation: Child  Secondary Emergency Contact: beverley more  Mobile Phone: [phone]  Relation: None    Past Surgical History:  Past Surgical History:   Procedure Laterality Date    CARDIAC CATHETERIZATION  2022    Dr. Domenic Olivarez N/A 1/3/2023    CABG CORONARY ARTERY BYPASS, BRISEIDA performed by Sánchez Murcia MD at [facility] N/A 2022    EGD ESOPHAGOGASTRODUODENOSCOPY performed by Onur Dale MD at [facility] History: There is no immunization history on file for this patient.     Active Problems:  Patient Active Problem List   Diagnosis Code    Bilateral carotid artery stenosis I65.23    NSTEMI (non-ST elevated myocardial infarction) ([facility] I21.4    Acute heart failure (HCC) O59.7    Systolic murmur G97.1    EKG, abnormal R94.31    Abnormal nuclear stress test R94.39    Primary hypertension I10    Hyperlipidemia E78.5    Carotid artery disease (HCC) I77.9    CAD (coronary artery disease) I25.10    Acute pulmonary insufficiency J98.4    Acute postoperative pain G89.18       Isolation/Infection:   Isolation            No Isolation          Patient Infection Status       Infection Onset Added Last Indicated Last Indicated By Review Planned Expiration Resolved Resolved By    None active    Resolved    MRSA 22 Culture, MRSA, Screening   23 Melissa Betts RN    22 nares - treatment initiated. Nurse Assessment:  Last Vital Signs: /68   Pulse 98   Temp 98.2 °F (36.8 °C) (Temporal)   Resp 18   Ht 5' 4\" (1.626 m)   Wt 152 lb 9.6 oz (69.2 kg)   SpO2 92%   BMI 26.19 kg/m²     Last documented pain score (0-10 scale): Pain Level: 0  Last Weight:   Wt Readings from Last 1 Encounters:   23 152 lb 9.6 oz (69.2 kg)     Mental Status:  {IP PT MENTAL STATUS:}    IV Access:  { MARIT[facility] IV ACCESS:113105179}    Nursing Mobility/ADLs:  Walking   {[facility] DME GCSZ:679183408}  Transfer  {[facility] DME PWO}  Bathing  {[facility] DME WMTI:442862956}  Dressing  {[facility] DME UPET:720222776}  Toileting  {[facility] DME XWJY:995938651}  Feeding  {[facility] DME AKLF:497874007}  Med Admin  {[facility] DME UZJQ:059999035}  Med Delivery   { MARITZA MED Delivery:661233996}    Wound Care Documentation and Therapy:  Incision 23 Sternum (Active)   Dressing Status Clean;Dry; Intact 23   Dressing Change Due 01/10/23 01/06/23 0400   Dressing/Treatment Other (comment) 23   Closure Other (Comment) 23   Margins Other (Comment) 23   Incision Assessment Other (Comment) 23   Drainage Amount None 23   Odor None 23   Yvrose-incision Assessment Other (Comment) 23   Number of days: 3 Incision 23 Femoral Distal;Left;Lateral (Active)   Dressing Status Clean;Dry; Intact 23 0400   Dressing Change Due 23 0400   Incision Cleansed Cleansed with saline 23 0400   Dressing/Treatment Open to air 23 0400   Closure Surgical glue 23 0400   Margins Approximated 23 0400   Incision Assessment Dry 23 0400   Drainage Amount None 23 0400   Odor None 23 0400   Yvrose-incision Assessment Intact 23 0400   Number of days: 3        Elimination:  Continence: Bowel: {YES / JT:86695}  Bladder: {YES / QD:16155}  Urinary Catheter: {Urinary Catheter:004563227}   Colostomy/Ileostomy/Ileal Conduit: {YES / WC:78421}       Date of Last BM: ***    Intake/Output Summary (Last 24 hours) at 2023 1435  Last data filed at 2023 1336  Gross per 24 hour   Intake 0 ml   Output 1500 ml   Net -1500 ml     I/O last 3 completed shifts: In: 946.5 [P.O.:450; I.V.:496.5]  Out: 1940 [Urine:1330;  Chest Tube:610]    Safety Concerns:     [address] Safety Concerns:432451551}    Impairments/Disabilities:      [address] Impairments/Disabilities:465814929}    Nutrition Therapy:  Current Nutrition Therapy:   [address] Diet List:807179048}    Routes of Feeding: {CHP DME Other Feedings:111405307}  Liquids: {Slp liquid thickness:32330}  Daily Fluid Restriction: {CHP DME Yes amt example:211086168}  Last Modified Barium Swallow with Video (Video Swallowing Test): {Done Not Done BRBJ:360597867}    Treatments at the Time of Hospital Discharge:   Respiratory Treatments: ***  Oxygen Therapy:  {Therapy; copd oxygen:45515}  Ventilator:    { CC Vent PCTR:217335813}    Rehab Therapies: {THERAPEUTIC INTERVENTION:5853976408}  Weight Bearing Status/Restrictions: [address]  Weight Bearin}  Other Medical Equipment (for information only, NOT a DME order):  {EQUIPMENT:002234722}  Other Treatments: ***    Patient's personal belongings (please select all that are sent with patient):  {P DME Belongings:554758032}    RN SIGNATURE:  {Esignature:856800617}    CASE MANAGEMENT/SOCIAL WORK SECTION    Inpatient Status Date: ***    Readmission Risk Assessment Score:  Readmission Risk              Risk of Unplanned Readmission:  13           Discharging to Facility/ Agency   Name:   Address:  Phone:  Fax:    Dialysis Facility (if applicable)   Name:  Address:  Dialysis Schedule:  Phone:  Fax:    / signature: {Esignature:318577696}    PHYSICIAN SECTION    Prognosis: {Prognosis:6629414205}    Condition at Discharge: [address] [place] [address] Patient Condition:298972670}    Rehab Potential (if transferring to Rehab): {Prognosis:2371691483}    Recommended Labs or Other Treatments After Discharge: ***    Physician Certification: I certify the above information and transfer of Margie Diez  is necessary for the continuing treatment of the diagnosis listed and that she requires {Admit to Appropriate Level of Care:06938} for {GREATER/LESS:471391613} 30 days.      Update Admission H&P: {CHP DME Changes in ANGDR:589728483}    PHYSICIAN SIGNATURE:  {Esignature:764539996}

## 2023-01-06 NOTE — PATIENT CARE CONFERENCE
P Quality Flow/Interdisciplinary Rounds Progress Note        Quality Flow Rounds held on January 6, 2023    Disciplines Attending:  Bedside Nurse, , , and Nursing Unit Leadership    Marlyn Oh was admitted on 12/24/2022  2:40 PM    Anticipated Discharge Date:       Disposition:    Erlin Score:  Erlin Scale Score: 19    Readmission Risk              Risk of Unplanned Readmission:  14           Discussed patient goal for the day, patient clinical progression, and barriers to discharge.   The following Goal(s) of the Day/Commitment(s) have been identified:  Diagnostics - Report Results      Trang Iverson RN  January 6, 2023

## 2023-01-07 LAB
ANION GAP SERPL CALCULATED.3IONS-SCNC: 12 MMOL/L (ref 7–16)
BUN BLDV-MCNC: 24 MG/DL (ref 6–23)
CALCIUM SERPL-MCNC: 10.2 MG/DL (ref 8.6–10.2)
CHLORIDE BLD-SCNC: 100 MMOL/L (ref 98–107)
CO2: 24 MMOL/L (ref 22–29)
CREAT SERPL-MCNC: 0.7 MG/DL (ref 0.5–1)
GFR SERPL CREATININE-BSD FRML MDRD: >60 ML/MIN/1.73
GLUCOSE BLD-MCNC: 101 MG/DL (ref 74–99)
HCT VFR BLD CALC: 29.4 % (ref 34–48)
HEMOGLOBIN: 9.3 G/DL (ref 11.5–15.5)
MAGNESIUM: 2.2 MG/DL (ref 1.6–2.6)
MCH RBC QN AUTO: 29.3 PG (ref 26–35)
MCHC RBC AUTO-ENTMCNC: 31.6 % (ref 32–34.5)
MCV RBC AUTO: 92.7 FL (ref 80–99.9)
METER GLUCOSE: 111 MG/DL (ref 74–99)
PDW BLD-RTO: 13 FL (ref 11.5–15)
PLATELET # BLD: 312 E9/L (ref 130–450)
PMV BLD AUTO: 10.4 FL (ref 7–12)
POTASSIUM SERPL-SCNC: 4.4 MMOL/L (ref 3.5–5)
RBC # BLD: 3.17 E12/L (ref 3.5–5.5)
SODIUM BLD-SCNC: 136 MMOL/L (ref 132–146)
WBC # BLD: 11.9 E9/L (ref 4.5–11.5)

## 2023-01-07 PROCEDURE — 6370000000 HC RX 637 (ALT 250 FOR IP): Performed by: PHYSICIAN ASSISTANT

## 2023-01-07 PROCEDURE — 82962 GLUCOSE BLOOD TEST: CPT

## 2023-01-07 PROCEDURE — 2500000003 HC RX 250 WO HCPCS: Performed by: PHYSICIAN ASSISTANT

## 2023-01-07 PROCEDURE — 94640 AIRWAY INHALATION TREATMENT: CPT

## 2023-01-07 PROCEDURE — 6370000000 HC RX 637 (ALT 250 FOR IP): Performed by: NURSE PRACTITIONER

## 2023-01-07 PROCEDURE — 2140000000 HC CCU INTERMEDIATE R&B

## 2023-01-07 PROCEDURE — 6370000000 HC RX 637 (ALT 250 FOR IP)

## 2023-01-07 PROCEDURE — 36415 COLL VENOUS BLD VENIPUNCTURE: CPT

## 2023-01-07 PROCEDURE — 85027 COMPLETE CBC AUTOMATED: CPT

## 2023-01-07 PROCEDURE — 80048 BASIC METABOLIC PNL TOTAL CA: CPT

## 2023-01-07 PROCEDURE — 83735 ASSAY OF MAGNESIUM: CPT

## 2023-01-07 PROCEDURE — 93798 PHYS/QHP OP CAR RHAB W/ECG: CPT

## 2023-01-07 PROCEDURE — 2580000003 HC RX 258: Performed by: NURSE PRACTITIONER

## 2023-01-07 PROCEDURE — 6360000002 HC RX W HCPCS: Performed by: PHYSICIAN ASSISTANT

## 2023-01-07 RX ORDER — POLYETHYLENE GLYCOL 3350 17 G/17G
17 POWDER, FOR SOLUTION ORAL DAILY
Status: DISCONTINUED | OUTPATIENT
Start: 2023-01-07 | End: 2023-01-09 | Stop reason: HOSPADM

## 2023-01-07 RX ORDER — METOPROLOL SUCCINATE 25 MG/1
12.5 TABLET, EXTENDED RELEASE ORAL ONCE
Status: COMPLETED | OUTPATIENT
Start: 2023-01-07 | End: 2023-01-07

## 2023-01-07 RX ORDER — AMIODARONE HYDROCHLORIDE 200 MG/1
400 TABLET ORAL 3 TIMES DAILY
Status: DISCONTINUED | OUTPATIENT
Start: 2023-01-07 | End: 2023-01-09 | Stop reason: HOSPADM

## 2023-01-07 RX ADMIN — SENNOSIDES AND DOCUSATE SODIUM 1 TABLET: 50; 8.6 TABLET ORAL at 08:01

## 2023-01-07 RX ADMIN — AMIODARONE HYDROCHLORIDE 400 MG: 200 TABLET ORAL at 20:04

## 2023-01-07 RX ADMIN — IPRATROPIUM BROMIDE AND ALBUTEROL SULFATE 1 AMPULE: .5; 2.5 SOLUTION RESPIRATORY (INHALATION) at 16:24

## 2023-01-07 RX ADMIN — DEXTROSE MONOHYDRATE 2.5 MG/HR: 50 INJECTION, SOLUTION INTRAVENOUS at 23:25

## 2023-01-07 RX ADMIN — AMIODARONE HYDROCHLORIDE 400 MG: 200 TABLET ORAL at 15:18

## 2023-01-07 RX ADMIN — MAGNESIUM HYDROXIDE 30 ML: 400 SUSPENSION ORAL at 08:02

## 2023-01-07 RX ADMIN — MUPIROCIN: 20 OINTMENT TOPICAL at 20:04

## 2023-01-07 RX ADMIN — IPRATROPIUM BROMIDE AND ALBUTEROL SULFATE 1 AMPULE: .5; 2.5 SOLUTION RESPIRATORY (INHALATION) at 10:08

## 2023-01-07 RX ADMIN — FERROUS SULFATE TAB 325 MG (65 MG ELEMENTAL FE) 325 MG: 325 (65 FE) TAB at 15:17

## 2023-01-07 RX ADMIN — POLYETHYLENE GLYCOL 3350 17 G: 17 POWDER, FOR SOLUTION ORAL at 08:19

## 2023-01-07 RX ADMIN — SODIUM CHLORIDE, PRESERVATIVE FREE 10 ML: 5 INJECTION INTRAVENOUS at 20:09

## 2023-01-07 RX ADMIN — METOPROLOL SUCCINATE 12.5 MG: 25 TABLET, EXTENDED RELEASE ORAL at 21:43

## 2023-01-07 RX ADMIN — PANTOPRAZOLE SODIUM 40 MG: 40 TABLET, DELAYED RELEASE ORAL at 08:01

## 2023-01-07 RX ADMIN — ASPIRIN 81 MG: 81 TABLET, COATED ORAL at 08:02

## 2023-01-07 RX ADMIN — OXYCODONE HYDROCHLORIDE AND ACETAMINOPHEN 500 MG: 500 TABLET ORAL at 08:01

## 2023-01-07 RX ADMIN — SENNOSIDES AND DOCUSATE SODIUM 1 TABLET: 50; 8.6 TABLET ORAL at 20:09

## 2023-01-07 RX ADMIN — BISACODYL 5 MG: 5 TABLET, COATED ORAL at 08:02

## 2023-01-07 RX ADMIN — METOPROLOL SUCCINATE 37.5 MG: 25 TABLET, EXTENDED RELEASE ORAL at 08:02

## 2023-01-07 RX ADMIN — OXYCODONE HYDROCHLORIDE AND ACETAMINOPHEN 500 MG: 500 TABLET ORAL at 20:04

## 2023-01-07 RX ADMIN — FOLIC ACID 1 MG: 1 TABLET ORAL at 08:02

## 2023-01-07 RX ADMIN — OXYCODONE AND ACETAMINOPHEN 1 TABLET: 5; 325 TABLET ORAL at 07:46

## 2023-01-07 RX ADMIN — AMIODARONE HYDROCHLORIDE 400 MG: 200 TABLET ORAL at 08:01

## 2023-01-07 RX ADMIN — METOPROLOL SUCCINATE 37.5 MG: 25 TABLET, EXTENDED RELEASE ORAL at 20:04

## 2023-01-07 RX ADMIN — DIPHENHYDRAMINE HYDROCHLORIDE 25 MG: 25 TABLET ORAL at 23:21

## 2023-01-07 RX ADMIN — MUPIROCIN: 20 OINTMENT TOPICAL at 08:19

## 2023-01-07 RX ADMIN — Medication 400 MG: at 08:19

## 2023-01-07 RX ADMIN — BUMETANIDE 1 MG: 1 TABLET ORAL at 08:02

## 2023-01-07 RX ADMIN — FERROUS SULFATE TAB 325 MG (65 MG ELEMENTAL FE) 325 MG: 325 (65 FE) TAB at 08:02

## 2023-01-07 RX ADMIN — ENOXAPARIN SODIUM 40 MG: 100 INJECTION SUBCUTANEOUS at 08:01

## 2023-01-07 ASSESSMENT — PAIN SCALES - GENERAL
PAINLEVEL_OUTOF10: 6
PAINLEVEL_OUTOF10: 0

## 2023-01-07 NOTE — PROGRESS NOTES
[facility] Cardiology progress note  Rod Shipley    Patient is seen in follow-up for non-ST elevation MI    Subjective:     Ms. Rayna Landry feels better today   Sitting up in bed no apparent distress    ROS:  CONSTITUTIONAL:  negative for  fevers, chills  HEENT:  negative for earaches, nasal congestion and epistaxis  RESPIRATORY:  negative for  dry cough, cough with sputum,wheezing and hemoptysis  GASTROINTESTINAL:  negative for nausea, vomiting  MUSCULOSKELETAL:  negative for  myalgias, arthralgias  NEUROLOGICAL:  negative for visual disturbance, dysphagia       Scheduled Meds:   simvastatin  20 mg Oral Nightly    bumetanide  1 mg Oral Daily    metoprolol succinate  37.5 mg Oral BID    aspirin  81 mg Oral Daily    bisacodyl  5 mg Oral Daily    sennosides-docusate sodium  1 tablet Oral BID    magnesium hydroxide  30 mL Oral Daily    ferrous sulfate  325 mg Oral BID WC    vitamin C  500 mg Oral BID    folic acid  1 mg Oral Daily    pantoprazole  40 mg Oral Daily    insulin lispro  0-12 Units SubCUTAneous TID WC    insulin lispro  0-6 Units SubCUTAneous Nightly    enoxaparin  40 mg SubCUTAneous Daily    sodium chloride flush  5-40 mL IntraVENous 2 times per day    magnesium oxide  400 mg Oral Daily    mupirocin   Nasal BID    ipratropium-albuterol  1 ampule Inhalation Q4H WA    [Held by provider] clopidogrel  75 mg Oral Daily     Continuous Infusions:   amiodarone 450mg/250ml D5W infusion 0.5 mg/min (01/06/23 1746)    dextrose       PRN Meds:perflutren lipid microspheres, acetaminophen, oxyCODONE-acetaminophen **OR** oxyCODONE-acetaminophen, potassium chloride, bisacodyl, diphenhydrAMINE, amiodarone, magnesium sulfate, morphine, sodium chloride, trimethobenzamide, sodium chloride flush, glucose, dextrose bolus **OR** dextrose bolus, glucagon (rDNA), dextrose    I/O last 3 completed shifts: In: 350 [P.O.:290; I.V.:60]  Out: 2000 [Urine:1500; Chest Tube:500]  No intake/output data recorded.       Objective:      Physical Exam:   BP (!) 140/61   Pulse 78   Temp 97 °F (36.1 °C) (Temporal)   Resp 19   Ht 5' 4\" (1.626 m)   Wt 152 lb 9.6 oz (69.2 kg)   SpO2 93%   BMI 26.19 kg/m²   CONSTITUTIONAL:  no apparent distress, and appears stated age  HEAD:  normocepalic, without obvious abnormality, atraumatic  NECK:  Supple, symmetrical, trachea midline, no adenopathy, thyroid symmetric, not enlarged and no tenderness, skin normal  LUNGS:  No increased work of breathing, No accessory muscle use or intercostal retractions, good air exchange, scattered rhonchi  CARDIOVASCULAR:  Normal apical impulse, regular rate and rhythm, normal S1 and S2, no S3 or S4, and no murmur noted, no edema, no JVD, no carotid bruit. ABDOMEN:  Soft, nontender, no masses, no hepatomegaly, no splenomegaly, BS+  MUSCULOSKELETAL:  No clubbing no cyanosis. there is no redness, warmth, or swelling of the joints  full range of motion noted  NEUROLOGIC:  Alert, awake,oriented x3  SKIN:  no bruising or bleeding, normal skin color, texture, turgor and no redness, warmth, or swelling      Cardiographics  I personally reviewed the telemetry monitor strips with the following interpretation: Sinus rhythm    Echocardiogram: 12/26/2022,Summary   The left ventricle is mildly dilated. There is apical wall akinesis, distal septal dyskinesis and distal   inferior wall hypokinesis with thinning corresponding thinning of the   myocardium. Ejection fraction is visually estimated at 40%. There is doppler evidence of stage I diastolic dysfunction. Normal right ventricular size and function. Normal size atria. Mild mitral regurgitation. Mild tricuspid regurgitation. Moderate pulmonary hypertension. Imaging  XR CHEST PORTABLE   Final Result   1. Interval removal of right IJ central venous sheath and catheter, no   complication. 2.  Pulmonary venous hypertension and slight interstitial edema. Trace   pleural effusions suspected.       3.  Stable mediastinal and pleural drains. XR CHEST PORTABLE   Final Result   No significant change         XR CHEST PORTABLE   Final Result   Postoperative changes with tubes and catheters as noted. Atelectasis/infiltrates and pleural effusion likely CHF and or pneumonia. CT CHEST WO CONTRAST   Final Result   Limited evaluation of vascular structures due to lack of intravenous contrast   however no significant aneurysmal dilatation of the thoracic aorta maximum   transaxial diameter supravalvular ascending portion 3.4 cm with calcific   involvement distal transverse through descending portion which appears   nonaneurysmal as well. Cardiac size enlarged with four-chamber cardiomegaly   demonstrating coronary calcifications. No pericardial effusion      Small bilateral pleural effusions right greater than left with minimal   adjacent atelectasis. US DUP LOWER EXTREMITY MAPPING BILAT VENOUS   Final Result   1. Bilateral venous mapping as described. 2. No superficial venous thrombosis visualized. US CAROTID ARTERY BILATERAL   Final Result   Atherosclerotic disease. Estimated stenosis by NASCET criteria in the proximal right carotid   artery is between 0% to 49% . Estimated stenosis by NASCET criteria in the proximal left carotid   artery is between 50% to 69%. VL LUCIANO BILATERAL LIMITED 1-2 LEVELS   Final Result      XR CHEST PORTABLE   Final Result   Cardiomegaly with  patchy perihilar and bibasilar infiltrates and effusions   likely CHF/edema and or pneumonia. Soft tissue prominence in the right hilum. Consider surveillance preferably   by CT scan.          XR CHEST PORTABLE    (Results Pending)       Lab Review   Lab Results   Component Value Date/Time     01/06/2023 05:42 AM    K 4.7 01/06/2023 05:42 AM    K 4.1 12/29/2022 05:15 AM     01/06/2023 05:42 AM    CO2 24 01/06/2023 05:42 AM    BUN 20 01/06/2023 05:42 AM    CREATININE 0.7 01/06/2023 05:42 AM    GLUCOSE 95 01/06/2023 05:42 AM    CALCIUM 10.0 01/06/2023 05:42 AM     Lab Results   Component Value Date/Time    WBC 11.2 01/06/2023 05:42 AM    HGB 9.0 01/06/2023 05:42 AM    HCT 28.5 01/06/2023 05:42 AM    HCT 26.0 01/04/2023 05:37 AM    MCV 94.4 01/06/2023 05:42 AM     01/06/2023 05:42 AM     I have personally reviewed the laboratory, cardiac diagnostic and radiographic testing as outlined above:    Assessment:     1.  CAD: S/p CABG with LIMA to LAD, CryoVein to OM, CryoVein to RCA, doing fine, will continue current treatment   2. Postop paroxysmal atrial fibrillation: Velosef rhythm   3. Ischemic cardiomyopathy  4. Mitral valve regurgitation: Mild  5. Tricuspid valve regurgitation: Mild  6. Pulmonary hypertension: Moderate  7. Hypertension:   8. Hyperlipidemia: On statin    Recommendations:     1. Continue current treatment  2. Basic metabolic panel and CBC in a.m. No active general cardiology problems, will see as needed, thank you  Discussed with patient  Electronically signed by Aracelis Conteh MD on 1/6/2023 at 8:08 PM    NOTE: This report was transcribed using voice recognition software.  Every effort was made to ensure accuracy; however, inadvertent computerized transcription errors may be present

## 2023-01-07 NOTE — PROGRESS NOTES
Hospitalist Progress Note      PCP: Uvaldo Castaneda MD    Date of Admission: 12/24/2022        Hospital Course:  HAD BEEN HAVING CHEST PAIN, AND WAS FOR A HEART CATH AFTER THE 1ST OF THE YEAR.   IT BECAME WORSE AND SHE CAME TO THE ER, FOUND TO HAVE A NSTEMI **    FOR EGD TODAY, DUE TO DECREASING HGB  **IT SHOWED GASTRITIS**  HAS 3 V DISEASE**  CTS SURGERY ON Tuesday,   CABG X 3,          Subjective:     Sitting up in chair  Family at bedside  No acute complaints  Resting comfortably  Still has not had a bowel movement  Routine regimen is 3 to 4 days for bowel movements          Medications:  Reviewed    Infusion Medications    dextrose       Scheduled Medications    amiodarone  400 mg Oral TID    polyethylene glycol  17 g Oral Daily    simvastatin  20 mg Oral Nightly    bumetanide  1 mg Oral Daily    metoprolol succinate  37.5 mg Oral BID    aspirin  81 mg Oral Daily    bisacodyl  5 mg Oral Daily    sennosides-docusate sodium  1 tablet Oral BID    magnesium hydroxide  30 mL Oral Daily    ferrous sulfate  325 mg Oral BID WC    vitamin C  500 mg Oral BID    folic acid  1 mg Oral Daily    pantoprazole  40 mg Oral Daily    enoxaparin  40 mg SubCUTAneous Daily    sodium chloride flush  5-40 mL IntraVENous 2 times per day    magnesium oxide  400 mg Oral Daily    mupirocin   Nasal BID    ipratropium-albuterol  1 ampule Inhalation Q4H WA    [Held by provider] clopidogrel  75 mg Oral Daily     PRN Meds: perflutren lipid microspheres, acetaminophen, oxyCODONE-acetaminophen **OR** oxyCODONE-acetaminophen, potassium chloride, bisacodyl, diphenhydrAMINE, amiodarone, magnesium sulfate, morphine, sodium chloride, trimethobenzamide, sodium chloride flush, glucose, dextrose bolus **OR** dextrose bolus, glucagon (rDNA), dextrose      Intake/Output Summary (Last 24 hours) at 1/7/2023 1250  Last data filed at 1/7/2023 0839  Gross per 24 hour   Intake 862 ml   Output 900 ml   Net -38 ml       Exam:    BP (!) 148/73   Pulse 77 Temp 97.8 °F (36.6 °C) (Temporal)   Resp 18   Ht 5' 4\" (1.626 m)   Wt 149 lb 3.2 oz (67.7 kg)   SpO2 94%   BMI 25.61 kg/m²       Gen:  WELL DEVELOPED  HEENT: NC/AT, moist mucous membranes,   Neck: supple, trachea midline, no anterior cervical or SC LAD  Heart:  Normal s1/s2, RRR,  Lungs:  DIMINISHED bilaterally,  Abd: bowel sounds present, soft, nontender, nondistended, no masses  Extrem:  No clubbing, cyanosis,   NO  edema  Skin: no rashes or lesions  Psych: A & O x3  Neuro: grossly intact, moves all four extremities. Labs:   Recent Labs     01/05/23  0420 01/06/23  0542 01/07/23  0649   WBC 12.6* 11.2 11.9*   HGB 8.8* 9.0* 9.3*   HCT 27.5* 28.5* 29.4*    205 312     Recent Labs     01/05/23  0420 01/06/23  0542 01/07/23  0649    139 136   K 4.7 4.7 4.4   * 104 100   CO2 21* 24 24   BUN 24* 20 24*   CREATININE 0.8 0.7 0.7   CALCIUM 9.9 10.0 10.2     No results for input(s): AST, ALT, BILIDIR, BILITOT, ALKPHOS in the last 72 hours. No results for input(s): INR in the last 72 hours. No results for input(s): Corky Stallion in the last 72 hours. No results for input(s): AST, ALT, ALB, BILIDIR, BILITOT, ALKPHOS in the last 72 hours. No results for input(s): LACTA in the last 72 hours.   No results found for: Nu Iniguez  No results found for: AMMONIA    Assessment:    Active Hospital Problems    Diagnosis Date Noted    Postoperative atrial fibrillation ([facility] [I97.89, I48.91] 01/06/2023     Priority: Medium    Acute pulmonary insufficiency [J98.4] 01/03/2023     Priority: Medium    Acute postoperative pain [G89.18] 01/03/2023     Priority: Medium    NSTEMI (non-ST elevated myocardial infarction) ([address] [I21.4] 12/24/2022     Priority: Medium    Acute heart failure ([facility] [I50.9] 12/24/2022     Priority: Medium    Systolic murmur [P96.8] 91/80/6447     Priority: Medium    EKG, abnormal [R94.31] 12/24/2022     Priority: Medium    Abnormal nuclear stress test [R94.39] 12/24/2022     Priority: Medium    Primary hypertension [I10] 12/24/2022     Priority: Medium    Hyperlipidemia [E78.5] 12/24/2022     Priority: Medium    Carotid artery disease ([facility] [I77.9] 12/24/2022     Priority: Medium    CAD (coronary artery disease) [I25.10] 12/24/2022   CONSTIPATION   ANEMIA( iron def)   S/P CABG X 3     Plan:   SUPPORTIVE CARE  SSI  CORDARONE   ZOCOR  Marjo Bras    1/6/2023  FABIOLA hose bilateral  Lovenox for DVT prophylaxis  Amiodarone drip IV  Postop day #3-CABG-1/3  Glucose levels within normal limits    1/7/2023   Ambulate in halls as able  Bowel regimen, constipation is multifactorial including narcotics, deconditioning, normal schedule of 3 to 4 days  Amiodarone discontinued this morning  DC plan when adequate arrangements at home  Echocardiogram completed-EF 45 to 50%      DVT Prophylaxis:  SCD  Diet: ADULT DIET; Regular; 4 carb choices (60 gm/meal); Low Fat/Low Chol/High Fiber/CONSTANCE; No Added Salt (3-4 gm); High Fiber  Code Status: Full Code     PT/OT Eval Status:   [id number]       Electronically signed by Amy Finch MD on 1/7/2023 at 12:50 PM

## 2023-01-07 NOTE — PLAN OF CARE
Problem: Discharge Planning  Goal: Discharge to home or other facility with appropriate resources  1/7/2023 0606 by Homero Berrios RN  Outcome: Progressing  Flowsheets (Taken 1/6/2023 2015)  Discharge to home or other facility with appropriate resources: Identify barriers to discharge with patient and caregiver  1/6/2023 1646 by Ga Ellis RN  Outcome: Progressing     Problem: Pain  Goal: Verbalizes/displays adequate comfort level or baseline comfort level  1/7/2023 0606 by Homero Berrios RN  Outcome: Progressing  Flowsheets  Taken 1/7/2023 0330  Verbalizes/displays adequate comfort level or baseline comfort level: Encourage patient to monitor pain and request assistance  Taken 1/6/2023 2015  Verbalizes/displays adequate comfort level or baseline comfort level: Encourage patient to monitor pain and request assistance  1/6/2023 1646 by Ga Ellis RN  Outcome: Progressing     Problem: ABCDS Injury Assessment  Goal: Absence of physical injury  1/7/2023 0606 by Homero Berrios RN  Outcome: Progressing  Flowsheets (Taken 1/7/2023 0330)  Absence of Physical Injury: Implement safety measures based on patient assessment  1/6/2023 1646 by Ga Ellis RN  Outcome: Progressing     Problem: Safety - Adult  Goal: Free from fall injury  Outcome: Progressing  Flowsheets (Taken 1/7/2023 0330)  Free From Fall Injury: Instruct family/caregiver on patient safety     Problem: Nutrition Deficit:  Goal: Optimize nutritional status  Outcome: Progressing

## 2023-01-07 NOTE — PROGRESS NOTES
POD#4  Awake, alert. No complaints. Denies CP, palpitations, SOB at rest, dizziness/lightheadedness. Vitals:    01/06/23 2015 01/06/23 2235 01/07/23 0330 01/07/23 0400   BP:  117/67 137/69    Pulse: 73 80 65    Resp:  18 20    Temp:  97.1 °F (36.2 °C) 96.8 °F (36 °C)    TempSrc:  Temporal Temporal    SpO2:  91% 96%    Weight:    149 lb 3.2 oz (67.7 kg)   Height:         O2: None       Intake/Output Summary (Last 24 hours) at 1/7/2023 0603  Last data filed at 1/7/2023 0330  Gross per 24 hour   Intake 742 ml   Output 950 ml   Net -208 ml           UO: voiding without difficulty         Recent Labs     01/05/23  0420 01/06/23  0542   WBC 12.6* 11.2   HGB 8.8* 9.0*   HCT 27.5* 28.5*    205      Recent Labs     01/05/23  0420 01/06/23  0542   BUN 24* 20   CREATININE 0.8 0.7         Telemetry: SR      PE  Cardiac: RRR  Lungs: decreased bases  Chest incision with intact EFRAÍN DSD. Fadumo Damon Sternum stable. Prior chest tube site incisions C/D/I, no erythema with intact sutures. Epicardial pacing wires present and secure. Abd: Soft, nontender, +BS  Ext: Incisions C/D/I, approximated, no erythema, + edema           A/P: POD# 4    CAD/NSTEMI  --Stable s/p CABG x3 on (LIMA-LAD, CryoVein-OM, CryoVein-RCA)/MYNOR exclusion with 35mm AtriClip on 1/3/2023  --Post op BRISEIDA reveals 50% EF on epinephrine  --TTE yest with EF of 45-50% mild MR and moderate TR- no WCD on DC   --Scr stable post op 0.7   --ASA/statin/BB - plavix on hold for possible eliquis, no eliquis at this time   --reinforce sternal precautions  --continue epicardial pacing wires  -- All CTs out       2. Expected acute blood loss anemia secondary to open heart surgery  --stable 9.3         3. Post Op Afib  --continue BB with hold parameters - Toprol XL to 37.5mg BID  --received amio bolus, amio gtt x 24 hours completed   --Start PO amio 400 mg tid today         4.  Expected acute pulmonary insufficiency in the setting following surgery  --continue duonebs with ezpap  --encourage C&DB, SMI  --currently on RA  --diurese PO bumex        5. HFrEF/ICM  --EF~40; required epi post-op  --Toprol XL 37.5mg BID  -- No ACE or ARB per Dr Fox Daryl   --PO bumex        6. Constipation--expected delayed return of bowel function  --secondary to anesthesia, narcotics, decreased oral intake, and decreased physical activity   --no BM since prior to surgery  --Continue daily MOM/oral bisacodyl until +BM and senna-s as scheduled. --Will give daily suppository until +BM starting POD 3 if no result by then   --Encouraged continued increase in oral intake and activity.           7. Expected deconditioning in the setting following surgery  --Increase activity as tolerated  --PT/OT           DVT prophylaxis:  --continue bilateral knee high FABIOLA hose  --continue PCDs  --continue progressive ambulation  --lovenox for dvt prophylaxis and continue knee high FABIOLA hose/pcds/progressive ambulation        Dispo: home vs. Rehab pending progression in activity level         This patient's case and care plan was discussed with the attending surgeon

## 2023-01-08 LAB
ANION GAP SERPL CALCULATED.3IONS-SCNC: 11 MMOL/L (ref 7–16)
BUN BLDV-MCNC: 24 MG/DL (ref 6–23)
CALCIUM SERPL-MCNC: 10.2 MG/DL (ref 8.6–10.2)
CHLORIDE BLD-SCNC: 100 MMOL/L (ref 98–107)
CO2: 25 MMOL/L (ref 22–29)
CREAT SERPL-MCNC: 0.7 MG/DL (ref 0.5–1)
GFR SERPL CREATININE-BSD FRML MDRD: >60 ML/MIN/1.73
GLUCOSE BLD-MCNC: 127 MG/DL (ref 74–99)
HCT VFR BLD CALC: 31.9 % (ref 34–48)
HEMOGLOBIN: 10.3 G/DL (ref 11.5–15.5)
MAGNESIUM: 2.5 MG/DL (ref 1.6–2.6)
MCH RBC QN AUTO: 29.5 PG (ref 26–35)
MCHC RBC AUTO-ENTMCNC: 32.3 % (ref 32–34.5)
MCV RBC AUTO: 91.4 FL (ref 80–99.9)
PDW BLD-RTO: 13.1 FL (ref 11.5–15)
PLATELET # BLD: 456 E9/L (ref 130–450)
PMV BLD AUTO: 9.6 FL (ref 7–12)
POTASSIUM SERPL-SCNC: 4.4 MMOL/L (ref 3.5–5)
RBC # BLD: 3.49 E12/L (ref 3.5–5.5)
SODIUM BLD-SCNC: 136 MMOL/L (ref 132–146)
WBC # BLD: 11.4 E9/L (ref 4.5–11.5)

## 2023-01-08 PROCEDURE — 94640 AIRWAY INHALATION TREATMENT: CPT

## 2023-01-08 PROCEDURE — 6370000000 HC RX 637 (ALT 250 FOR IP)

## 2023-01-08 PROCEDURE — 6360000002 HC RX W HCPCS: Performed by: PHYSICIAN ASSISTANT

## 2023-01-08 PROCEDURE — 93798 PHYS/QHP OP CAR RHAB W/ECG: CPT

## 2023-01-08 PROCEDURE — 6370000000 HC RX 637 (ALT 250 FOR IP): Performed by: NURSE PRACTITIONER

## 2023-01-08 PROCEDURE — 85027 COMPLETE CBC AUTOMATED: CPT

## 2023-01-08 PROCEDURE — 80048 BASIC METABOLIC PNL TOTAL CA: CPT

## 2023-01-08 PROCEDURE — 2580000003 HC RX 258: Performed by: NURSE PRACTITIONER

## 2023-01-08 PROCEDURE — 36415 COLL VENOUS BLD VENIPUNCTURE: CPT

## 2023-01-08 PROCEDURE — 6370000000 HC RX 637 (ALT 250 FOR IP): Performed by: PHYSICIAN ASSISTANT

## 2023-01-08 PROCEDURE — 2140000000 HC CCU INTERMEDIATE R&B

## 2023-01-08 PROCEDURE — 83735 ASSAY OF MAGNESIUM: CPT

## 2023-01-08 RX ADMIN — PANTOPRAZOLE SODIUM 40 MG: 40 TABLET, DELAYED RELEASE ORAL at 09:03

## 2023-01-08 RX ADMIN — SENNOSIDES AND DOCUSATE SODIUM 1 TABLET: 50; 8.6 TABLET ORAL at 20:15

## 2023-01-08 RX ADMIN — METOPROLOL SUCCINATE 37.5 MG: 25 TABLET, EXTENDED RELEASE ORAL at 09:04

## 2023-01-08 RX ADMIN — BISACODYL 10 MG: 10 SUPPOSITORY RECTAL at 09:03

## 2023-01-08 RX ADMIN — FOLIC ACID 1 MG: 1 TABLET ORAL at 09:04

## 2023-01-08 RX ADMIN — MAGNESIUM HYDROXIDE 30 ML: 400 SUSPENSION ORAL at 09:03

## 2023-01-08 RX ADMIN — AMIODARONE HYDROCHLORIDE 400 MG: 200 TABLET ORAL at 20:15

## 2023-01-08 RX ADMIN — OXYCODONE HYDROCHLORIDE AND ACETAMINOPHEN 500 MG: 500 TABLET ORAL at 09:03

## 2023-01-08 RX ADMIN — Medication 400 MG: at 09:04

## 2023-01-08 RX ADMIN — SODIUM CHLORIDE, PRESERVATIVE FREE 10 ML: 5 INJECTION INTRAVENOUS at 20:16

## 2023-01-08 RX ADMIN — BUMETANIDE 1 MG: 1 TABLET ORAL at 09:03

## 2023-01-08 RX ADMIN — SENNOSIDES AND DOCUSATE SODIUM 1 TABLET: 50; 8.6 TABLET ORAL at 09:03

## 2023-01-08 RX ADMIN — AMIODARONE HYDROCHLORIDE 400 MG: 200 TABLET ORAL at 15:35

## 2023-01-08 RX ADMIN — APIXABAN 5 MG: 5 TABLET, FILM COATED ORAL at 20:16

## 2023-01-08 RX ADMIN — POLYETHYLENE GLYCOL 3350 17 G: 17 POWDER, FOR SOLUTION ORAL at 09:03

## 2023-01-08 RX ADMIN — BISACODYL 5 MG: 5 TABLET, COATED ORAL at 09:03

## 2023-01-08 RX ADMIN — IPRATROPIUM BROMIDE AND ALBUTEROL SULFATE 1 AMPULE: .5; 2.5 SOLUTION RESPIRATORY (INHALATION) at 11:56

## 2023-01-08 RX ADMIN — ENOXAPARIN SODIUM 40 MG: 100 INJECTION SUBCUTANEOUS at 09:04

## 2023-01-08 RX ADMIN — FERROUS SULFATE TAB 325 MG (65 MG ELEMENTAL FE) 325 MG: 325 (65 FE) TAB at 09:04

## 2023-01-08 RX ADMIN — AMIODARONE HYDROCHLORIDE 400 MG: 200 TABLET ORAL at 09:03

## 2023-01-08 RX ADMIN — ASPIRIN 81 MG: 81 TABLET, COATED ORAL at 09:04

## 2023-01-08 RX ADMIN — METOPROLOL SUCCINATE 37.5 MG: 25 TABLET, EXTENDED RELEASE ORAL at 20:15

## 2023-01-08 RX ADMIN — DIPHENHYDRAMINE HYDROCHLORIDE 25 MG: 25 TABLET ORAL at 22:46

## 2023-01-08 RX ADMIN — FERROUS SULFATE TAB 325 MG (65 MG ELEMENTAL FE) 325 MG: 325 (65 FE) TAB at 15:35

## 2023-01-08 RX ADMIN — OXYCODONE HYDROCHLORIDE AND ACETAMINOPHEN 500 MG: 500 TABLET ORAL at 20:15

## 2023-01-08 RX ADMIN — APIXABAN 5 MG: 5 TABLET, FILM COATED ORAL at 10:34

## 2023-01-08 RX ADMIN — IPRATROPIUM BROMIDE AND ALBUTEROL SULFATE 1 AMPULE: .5; 2.5 SOLUTION RESPIRATORY (INHALATION) at 17:17

## 2023-01-08 RX ADMIN — OXYCODONE AND ACETAMINOPHEN 1 TABLET: 5; 325 TABLET ORAL at 05:16

## 2023-01-08 RX ADMIN — IPRATROPIUM BROMIDE AND ALBUTEROL SULFATE 1 AMPULE: .5; 2.5 SOLUTION RESPIRATORY (INHALATION) at 08:53

## 2023-01-08 ASSESSMENT — PAIN SCALES - GENERAL
PAINLEVEL_OUTOF10: 0
PAINLEVEL_OUTOF10: 8

## 2023-01-08 NOTE — PROGRESS NOTES
POD#6 Awake, alert. No complaints. Up in chair. Denies CP, palpitations, SOB at rest, dizziness/lightheadedness. Vitals:    01/08/23 1010 01/08/23 1224 01/08/23 1250 01/08/23 1514   BP:  128/65  (!) 127/58   Pulse:  73  73   Resp:  18  18   Temp:  98.7 °F (37.1 °C)  98.8 °F (37.1 °C)   TempSrc:  Oral  Oral   SpO2: 96% 92% 97% 94%   Weight:       Height:         O2: none      Intake/Output Summary (Last 24 hours) at 1/8/2023 1607  Last data filed at 1/8/2023 0600  Gross per 24 hour   Intake 600 ml   Output 200 ml   Net 400 ml         +BM on 1/8    UO: Voids without difficulty      Recent Labs     01/06/23  0542 01/07/23  0649 01/08/23  0651   WBC 11.2 11.9* 11.4   HGB 9.0* 9.3* 10.3*   HCT 28.5* 29.4* 31.9*    312 456*      Recent Labs     01/06/23  0542 01/07/23  0649 01/08/23  0651   BUN 20 24* 24*   CREATININE 0.7 0.7 0.7         Telemetry: SR      PE  Cardiac: RRR  Lungs: decreased bases  Chest incision C/D/I, approximated, no erythema. Sternum stable. Prior chest tube site incisions C/D/I, no erythema with intact sutures. Epicardial pacing wires present and secure. Abd: Soft, nontender, +BS  Ext: Incisions C/D/I, approximated, no erythema, + mild edema           A/P: POD#6    1. CAD/NSTEMI  --Stable s/p CABG x3 on (LIMA-LAD, CryoVein-OM, CryoVein-RCA)/MYNOR exclusion with 35mm AtriClip on 1/3/2023  --Post op BRISEIDA reveals 50% EF on epinephrine  --TTE recheck- with EF of 45-50% mild MR and moderate TR- no WCD on DC   --Scr stable  --ASA/statin--consider increasing zocor dose as an outpatient once amiodarone treatment complete and pending evaluation for possible myalgias d/t hx with lipitor use/BB/eliquis initiated 1/8  --reinforce sternal precautions  --Epicardial pacing wires cut without difficulty. Patient tolerated well       2. Expected acute blood loss anemia secondary to open heart surgery  --hgb stable         3.  Post Op Afib  -- RVR afib 1/7 evening-1/8 AM --received IV cardizem--stopped 0600 0n 1/8  -- Re-verted to SR 1/8 AM and remains in SR currently --continue BB with hold parameters - Toprol XL to 37.5mg BID- will not increase at this time with current rate  --received amio bolus, amio gtt x 24 hours completed   --On amio PO 400mg tid  --Eliquis initiated 1/8        4. Expected acute pulmonary insufficiency in the setting following surgery  --continue duonebs with ezpap  --encourage C&DB, SMI  --currently on RA  --diurese PO bumex        5. HFrEF/ICM  --EF~40; required epi post-op  --Toprol XL 37.5mg BID  --No ACE or ARB per Dr Aristides Owens   --PO bumex        6. Constipation--expected delayed return of bowel function  --secondary to anesthesia, narcotics, decreased oral intake, and decreased physical activity   --+BM 1/8  --Continue daily MOM/oral bisacodyl until +BM and senna-s as scheduled. --Encouraged continued increase in oral intake and activity.           7. Expected deconditioning in the setting following surgery  --Increase activity as tolerated  --PT/OT  --ambulating 400ft on RA           DVT prophylaxis:  --continue bilateral knee high FABIOLA hose  --continue PCDs  --continue progressive ambulation  --eliquis        Dispo: home today           This patient's case and care plan was discussed with the attending surgeon

## 2023-01-08 NOTE — CONSULTS
Met with patient and discussed our [address] and our outpatient Phase II Cardiac Rehabilitation program. Reviewed the benefits of cardiac rehabilitation based on their diagnosis and personal risk factors. Patient demonstrates strong interest in Cardiac Rehabilitation at this time. Cardiac Rehabilitation brochure provided to patient/family. The patient may call [facility] at [phone] for additional information or questions. Contact information for [facility] and other choices close to the patient's residence have been provided in the discharge instructions so that the patient may call and schedule an appointment when cleared by their physician.

## 2023-01-08 NOTE — PLAN OF CARE
Problem: Discharge Planning  Goal: Discharge to home or other facility with appropriate resources  1/7/2023 2149 by Marichuy Ramírez RN  Outcome: Progressing  1/7/2023 1913 by Leonor De Souza RN  Outcome: Progressing     Problem: Pain  Goal: Verbalizes/displays adequate comfort level or baseline comfort level  1/7/2023 2149 by Marichuy Ramírez RN  Outcome: Progressing  1/7/2023 1913 by Leonor De Souza RN  Outcome: Progressing     Problem: ABCDS Injury Assessment  Goal: Absence of physical injury  1/7/2023 2149 by Marichuy Ramírez RN  Outcome: Progressing  1/7/2023 1913 by Leonor De Souza RN  Outcome: Progressing  Flowsheets (Taken 1/7/2023 0816)  Absence of Physical Injury: Implement safety measures based on patient assessment     Problem: Safety - Adult  Goal: Free from fall injury  1/7/2023 2149 by Marichuy Ramírez RN  Outcome: Progressing  1/7/2023 1913 by Leonor De Souza RN  Outcome: Progressing  Flowsheets (Taken 1/7/2023 0816)  Free From Fall Injury: Instruct family/caregiver on patient safety

## 2023-01-08 NOTE — PLAN OF CARE
Problem: Discharge Planning  Goal: Discharge to home or other facility with appropriate resources  1/7/2023 1913 by Gui Lackey RN  Outcome: Progressing  1/7/2023 0606 by Bianca Goldstein RN  Outcome: Progressing  Flowsheets (Taken 1/6/2023 2015)  Discharge to home or other facility with appropriate resources: Identify barriers to discharge with patient and caregiver     Problem: Pain  Goal: Verbalizes/displays adequate comfort level or baseline comfort level  1/7/2023 1913 by Gui Lackey RN  Outcome: Progressing  1/7/2023 0606 by Bianca Goldstein RN  Outcome: Progressing  Flowsheets  Taken 1/7/2023 0330  Verbalizes/displays adequate comfort level or baseline comfort level: Encourage patient to monitor pain and request assistance  Taken 1/6/2023 2015  Verbalizes/displays adequate comfort level or baseline comfort level: Encourage patient to monitor pain and request assistance     Problem: ABCDS Injury Assessment  Goal: Absence of physical injury  1/7/2023 1913 by Gui Lackey RN  Outcome: Progressing  Flowsheets (Taken 1/7/2023 0816)  Absence of Physical Injury: Implement safety measures based on patient assessment  1/7/2023 0606 by Bianca Goldstein RN  Outcome: Progressing  Flowsheets (Taken 1/7/2023 0330)  Absence of Physical Injury: Implement safety measures based on patient assessment     Problem: Safety - Adult  Goal: Free from fall injury  1/7/2023 1913 by Gui Lackey RN  Outcome: Progressing  Flowsheets (Taken 1/7/2023 0816)  Free From Fall Injury: Instruct family/caregiver on patient safety  1/7/2023 0606 by Bianca Goldstein RN  Outcome: Progressing  Flowsheets (Taken 1/7/2023 0330)  Free From Fall Injury: Instruct family/caregiver on patient safety

## 2023-01-08 NOTE — PLAN OF CARE
Problem: Discharge Planning  Goal: Discharge to home or other facility with appropriate resources  Outcome: Progressing     Problem: Pain  Goal: Verbalizes/displays adequate comfort level or baseline comfort level  Outcome: Progressing  Flowsheets (Taken 1/8/2023 0310 by Philippe Tadeo RN)  Verbalizes/displays adequate comfort level or baseline comfort level: Encourage patient to monitor pain and request assistance     Problem: ABCDS Injury Assessment  Goal: Absence of physical injury  Outcome: Progressing  Flowsheets (Taken 1/8/2023 0956)  Absence of Physical Injury: Implement safety measures based on patient assessment     Problem: Safety - Adult  Goal: Free from fall injury  Outcome: Progressing  Flowsheets (Taken 1/8/2023 0956)  Free From Fall Injury: Instruct family/caregiver on patient safety

## 2023-01-09 VITALS
HEIGHT: 64 IN | BODY MASS INDEX: 24.89 KG/M2 | TEMPERATURE: 96.4 F | OXYGEN SATURATION: 94 % | WEIGHT: 145.8 LBS | DIASTOLIC BLOOD PRESSURE: 96 MMHG | RESPIRATION RATE: 16 BRPM | HEART RATE: 72 BPM | SYSTOLIC BLOOD PRESSURE: 135 MMHG

## 2023-01-09 LAB
ANION GAP SERPL CALCULATED.3IONS-SCNC: 13 MMOL/L (ref 7–16)
BUN BLDV-MCNC: 19 MG/DL (ref 6–23)
CALCIUM SERPL-MCNC: 9.9 MG/DL (ref 8.6–10.2)
CHLORIDE BLD-SCNC: 96 MMOL/L (ref 98–107)
CO2: 26 MMOL/L (ref 22–29)
CREAT SERPL-MCNC: 0.7 MG/DL (ref 0.5–1)
GFR SERPL CREATININE-BSD FRML MDRD: >60 ML/MIN/1.73
GLUCOSE BLD-MCNC: 132 MG/DL (ref 74–99)
HCT VFR BLD CALC: 33 % (ref 34–48)
HEMOGLOBIN: 10.7 G/DL (ref 11.5–15.5)
MAGNESIUM: 2.4 MG/DL (ref 1.6–2.6)
MCH RBC QN AUTO: 29.4 PG (ref 26–35)
MCHC RBC AUTO-ENTMCNC: 32.4 % (ref 32–34.5)
MCV RBC AUTO: 90.7 FL (ref 80–99.9)
PDW BLD-RTO: 12.9 FL (ref 11.5–15)
PLATELET # BLD: 513 E9/L (ref 130–450)
PMV BLD AUTO: 9.5 FL (ref 7–12)
POTASSIUM SERPL-SCNC: 3.8 MMOL/L (ref 3.5–5)
RBC # BLD: 3.64 E12/L (ref 3.5–5.5)
SODIUM BLD-SCNC: 135 MMOL/L (ref 132–146)
WBC # BLD: 11 E9/L (ref 4.5–11.5)

## 2023-01-09 PROCEDURE — 2580000003 HC RX 258: Performed by: NURSE PRACTITIONER

## 2023-01-09 PROCEDURE — 85027 COMPLETE CBC AUTOMATED: CPT

## 2023-01-09 PROCEDURE — 6370000000 HC RX 637 (ALT 250 FOR IP): Performed by: NURSE PRACTITIONER

## 2023-01-09 PROCEDURE — 93798 PHYS/QHP OP CAR RHAB W/ECG: CPT

## 2023-01-09 PROCEDURE — 6370000000 HC RX 637 (ALT 250 FOR IP): Performed by: PHYSICIAN ASSISTANT

## 2023-01-09 PROCEDURE — 80048 BASIC METABOLIC PNL TOTAL CA: CPT

## 2023-01-09 PROCEDURE — 6370000000 HC RX 637 (ALT 250 FOR IP)

## 2023-01-09 PROCEDURE — 83735 ASSAY OF MAGNESIUM: CPT

## 2023-01-09 PROCEDURE — 36415 COLL VENOUS BLD VENIPUNCTURE: CPT

## 2023-01-09 PROCEDURE — 97535 SELF CARE MNGMENT TRAINING: CPT

## 2023-01-09 PROCEDURE — 6370000000 HC RX 637 (ALT 250 FOR IP): Performed by: INTERNAL MEDICINE

## 2023-01-09 PROCEDURE — 94640 AIRWAY INHALATION TREATMENT: CPT

## 2023-01-09 RX ORDER — FERROUS SULFATE 325(65) MG
325 TABLET ORAL 2 TIMES DAILY WITH MEALS
Qty: 60 TABLET | Refills: 0 | Status: SHIPPED | OUTPATIENT
Start: 2023-01-09 | End: 2023-02-08

## 2023-01-09 RX ORDER — AMIODARONE HYDROCHLORIDE 200 MG/1
400 TABLET ORAL SEE ADMIN INSTRUCTIONS
Qty: 56 TABLET | Refills: 0 | Status: SHIPPED | OUTPATIENT
Start: 2023-01-09

## 2023-01-09 RX ORDER — ASPIRIN 81 MG/1
81 TABLET ORAL DAILY
Qty: 30 TABLET | Refills: 3 | Status: SHIPPED | OUTPATIENT
Start: 2023-01-09

## 2023-01-09 RX ORDER — ALPRAZOLAM 0.25 MG/1
0.25 TABLET ORAL 3 TIMES DAILY PRN
Qty: 9 TABLET | Refills: 0 | Status: SHIPPED | OUTPATIENT
Start: 2023-01-09 | End: 2023-01-12

## 2023-01-09 RX ORDER — DIPHENHYDRAMINE HCL 25 MG
25 TABLET ORAL EVERY 8 HOURS PRN
Qty: 90 TABLET | Refills: 2 | Status: SHIPPED | OUTPATIENT
Start: 2023-01-09 | End: 2023-02-08

## 2023-01-09 RX ORDER — ALPRAZOLAM 0.25 MG/1
0.25 TABLET ORAL 3 TIMES DAILY PRN
Status: DISCONTINUED | OUTPATIENT
Start: 2023-01-09 | End: 2023-01-09 | Stop reason: HOSPADM

## 2023-01-09 RX ORDER — LANOLIN ALCOHOL/MO/W.PET/CERES
400 CREAM (GRAM) TOPICAL DAILY
Qty: 14 TABLET | Refills: 0 | Status: SHIPPED | OUTPATIENT
Start: 2023-01-09 | End: 2023-01-23

## 2023-01-09 RX ORDER — DOCUSATE SODIUM 100 MG/1
100 CAPSULE, LIQUID FILLED ORAL 2 TIMES DAILY PRN
Qty: 40 CAPSULE | Refills: 0 | Status: SHIPPED | OUTPATIENT
Start: 2023-01-09 | End: 2023-02-08

## 2023-01-09 RX ORDER — SIMVASTATIN 20 MG
20 TABLET ORAL NIGHTLY
Qty: 30 TABLET | Refills: 3 | Status: SHIPPED | OUTPATIENT
Start: 2023-01-09

## 2023-01-09 RX ORDER — BUMETANIDE 1 MG/1
1 TABLET ORAL DAILY
Qty: 30 TABLET | Refills: 2 | Status: SHIPPED | OUTPATIENT
Start: 2023-01-09

## 2023-01-09 RX ORDER — OXYCODONE HYDROCHLORIDE AND ACETAMINOPHEN 5; 325 MG/1; MG/1
1 TABLET ORAL EVERY 6 HOURS PRN
Qty: 28 TABLET | Refills: 0 | Status: SHIPPED | OUTPATIENT
Start: 2023-01-09 | End: 2023-01-16

## 2023-01-09 RX ORDER — FOLIC ACID 1 MG/1
1 TABLET ORAL DAILY
Qty: 30 TABLET | Refills: 0 | Status: SHIPPED | OUTPATIENT
Start: 2023-01-09 | End: 2023-02-08

## 2023-01-09 RX ORDER — ASCORBIC ACID 500 MG
500 TABLET ORAL 2 TIMES DAILY
Qty: 60 TABLET | Refills: 0 | Status: SHIPPED | OUTPATIENT
Start: 2023-01-09 | End: 2023-02-08

## 2023-01-09 RX ORDER — METOPROLOL SUCCINATE 25 MG/1
25 TABLET, EXTENDED RELEASE ORAL 2 TIMES DAILY
Qty: 112 TABLET | Refills: 3 | Status: SHIPPED | OUTPATIENT
Start: 2023-01-09

## 2023-01-09 RX ORDER — PANTOPRAZOLE SODIUM 40 MG/1
40 TABLET, DELAYED RELEASE ORAL DAILY
Qty: 30 TABLET | Refills: 0 | Status: SHIPPED | OUTPATIENT
Start: 2023-01-09 | End: 2023-02-08

## 2023-01-09 RX ORDER — ALPRAZOLAM 0.25 MG/1
0.25 TABLET ORAL 2 TIMES DAILY PRN
Status: DISCONTINUED | OUTPATIENT
Start: 2023-01-09 | End: 2023-01-09 | Stop reason: SDUPTHER

## 2023-01-09 RX ADMIN — ASPIRIN 81 MG: 81 TABLET, COATED ORAL at 09:46

## 2023-01-09 RX ADMIN — AMIODARONE HYDROCHLORIDE 400 MG: 200 TABLET ORAL at 09:46

## 2023-01-09 RX ADMIN — FOLIC ACID 1 MG: 1 TABLET ORAL at 09:47

## 2023-01-09 RX ADMIN — OXYCODONE HYDROCHLORIDE AND ACETAMINOPHEN 500 MG: 500 TABLET ORAL at 09:47

## 2023-01-09 RX ADMIN — METOPROLOL SUCCINATE 37.5 MG: 25 TABLET, EXTENDED RELEASE ORAL at 09:46

## 2023-01-09 RX ADMIN — FERROUS SULFATE TAB 325 MG (65 MG ELEMENTAL FE) 325 MG: 325 (65 FE) TAB at 09:47

## 2023-01-09 RX ADMIN — APIXABAN 5 MG: 5 TABLET, FILM COATED ORAL at 09:47

## 2023-01-09 RX ADMIN — SODIUM CHLORIDE, PRESERVATIVE FREE 10 ML: 5 INJECTION INTRAVENOUS at 09:48

## 2023-01-09 RX ADMIN — PANTOPRAZOLE SODIUM 40 MG: 40 TABLET, DELAYED RELEASE ORAL at 09:47

## 2023-01-09 RX ADMIN — IPRATROPIUM BROMIDE AND ALBUTEROL SULFATE 1 AMPULE: .5; 2.5 SOLUTION RESPIRATORY (INHALATION) at 09:10

## 2023-01-09 RX ADMIN — BUMETANIDE 1 MG: 1 TABLET ORAL at 09:47

## 2023-01-09 RX ADMIN — ALPRAZOLAM 0.25 MG: 0.25 TABLET ORAL at 08:01

## 2023-01-09 RX ADMIN — Medication 400 MG: at 09:46

## 2023-01-09 ASSESSMENT — PAIN SCALES - GENERAL
PAINLEVEL_OUTOF10: 0
PAINLEVEL_OUTOF10: 0

## 2023-01-09 NOTE — CARE COORDINATION
Discharge order on chart. On RA. Ambulated 400ft on 01/08. Pt has family staying with her 24/7. Meaghan at [facility] notified of discharge today.

## 2023-01-09 NOTE — PROGRESS NOTES
Patient and son given written and verbal discharge instructions. Patient has an additional surgical bra and yoseph hose for home. Patient requested to have scripts  filled at her own pharmacy.

## 2023-01-09 NOTE — PROGRESS NOTES
Hospitalist Progress Note      PCP: Adria Leon MD    Date of Admission: 12/24/2022        Hospital Course:  HAD BEEN HAVING CHEST PAIN, AND WAS FOR A HEART CATH AFTER THE 1ST OF THE YEAR.   IT BECAME WORSE AND SHE CAME TO THE ER, FOUND TO HAVE A NSTEMI **    FOR EGD TODAY, DUE TO DECREASING HGB  **IT SHOWED GASTRITIS**  HAS 3 V DISEASE**  CTS SURGERY ON Tuesday,   BRIGHT 3  CABG X 3,             Subjective:  WANTS XANAX FOR DISCHARGE          Medications:  Reviewed    Infusion Medications    dilTIAZem (CARDIZEM) 100 mg in dextrose 5% 100 mL ([facility]) Stopped (01/08/23 0600)    dextrose       Scheduled Medications    apixaban  5 mg Oral BID    amiodarone  400 mg Oral TID    polyethylene glycol  17 g Oral Daily    simvastatin  20 mg Oral Nightly    bumetanide  1 mg Oral Daily    metoprolol succinate  37.5 mg Oral BID    aspirin  81 mg Oral Daily    bisacodyl  5 mg Oral Daily    sennosides-docusate sodium  1 tablet Oral BID    ferrous sulfate  325 mg Oral BID WC    vitamin C  500 mg Oral BID    folic acid  1 mg Oral Daily    pantoprazole  40 mg Oral Daily    sodium chloride flush  5-40 mL IntraVENous 2 times per day    magnesium oxide  400 mg Oral Daily    ipratropium-albuterol  1 ampule Inhalation Q4H WA     PRN Meds: ALPRAZolam, perflutren lipid microspheres, acetaminophen, oxyCODONE-acetaminophen **OR** oxyCODONE-acetaminophen, potassium chloride, bisacodyl, diphenhydrAMINE, amiodarone, magnesium sulfate, morphine, sodium chloride, trimethobenzamide, sodium chloride flush, glucose, dextrose bolus **OR** dextrose bolus, glucagon (rDNA), dextrose      Intake/Output Summary (Last 24 hours) at 1/9/2023 1451  Last data filed at 1/9/2023 1247  Gross per 24 hour   Intake 600 ml   Output --   Net 600 ml       Exam:    BP (!) 135/96   Pulse 72   Temp (!) 96.4 °F (35.8 °C) (Temporal)   Resp 16   Ht 5' 4\" (1.626 m)   Wt 145 lb 12.8 oz (66.1 kg)   SpO2 94% Comment: walking  BMI 25.03 kg/m²       Gen: WELL DEVELOPED  HEENT: NC/AT, moist mucous membranes,   Neck: supple, trachea midline, no anterior cervical or SC LAD  Heart:  Normal s1/s2, RRR,  Lungs:  DIMINISHED bilaterally,  Abd: bowel sounds present, soft, nontender, nondistended, no masses  Extrem:  No clubbing, cyanosis,   NO  edema  Skin: no rashes or lesions  Psych: A & O x3  Neuro: grossly intact, moves all four extremities. Labs:   Recent Labs     01/07/23  0649 01/08/23  0651 01/09/23  0552   WBC 11.9* 11.4 11.0   HGB 9.3* 10.3* 10.7*   HCT 29.4* 31.9* 33.0*    456* 513*     Recent Labs     01/07/23  0649 01/08/23  0651 01/09/23  0552    136 135   K 4.4 4.4 3.8    100 96*   CO2 24 25 26   BUN 24* 24* 19   CREATININE 0.7 0.7 0.7   CALCIUM 10.2 10.2 9.9     No results for input(s): AST, ALT, BILIDIR, BILITOT, ALKPHOS in the last 72 hours. No results for input(s): INR in the last 72 hours. No results for input(s): Marvie Forget in the last 72 hours. No results for input(s): AST, ALT, ALB, BILIDIR, BILITOT, ALKPHOS in the last 72 hours. No results for input(s): LACTA in the last 72 hours.   No results found for: Naye Duff  No results found for: AMMONIA    Assessment:    Active Hospital Problems    Diagnosis Date Noted    Postoperative atrial fibrillation ([facility] [I97.89, I48.91] 01/06/2023     Priority: Medium    Acute pulmonary insufficiency [J98.4] 01/03/2023     Priority: Medium    Acute postoperative pain [G89.18] 01/03/2023     Priority: Medium    NSTEMI (non-ST elevated myocardial infarction) ([facility] [I21.4] 12/24/2022     Priority: Medium    Acute heart failure ([facility] [I50.9] 12/24/2022     Priority: Medium    Systolic murmur [L32.6] 65/03/0339     Priority: Medium    EKG, abnormal [R94.31] 12/24/2022     Priority: Medium    Abnormal nuclear stress test [R94.39] 12/24/2022     Priority: Medium    Primary hypertension [I10] 12/24/2022     Priority: Medium    Hyperlipidemia [E78.5] 12/24/2022     Priority: Medium    Carotid artery disease ([facility] [I77.9] 12/24/2022     Priority: Medium    CAD (coronary artery disease) [I25.10] 12/24/2022   CONSTIPATION   ANEMIA( iron def)   S/P CABG X 3    Plan: DC HOME  Electronically signed by Eric Oglesby DO on 1/9/2023 at 2:51 PM [place] [facility]

## 2023-01-09 NOTE — PLAN OF CARE
Problem: Discharge Planning  Goal: Discharge to home or other facility with appropriate resources  Outcome: Progressing  Flowsheets (Taken 1/8/2023 2000)  Discharge to home or other facility with appropriate resources: Identify barriers to discharge with patient and caregiver     Problem: Pain  Goal: Verbalizes/displays adequate comfort level or baseline comfort level  Outcome: Progressing  Flowsheets (Taken 1/8/2023 2000)  Verbalizes/displays adequate comfort level or baseline comfort level: Encourage patient to monitor pain and request assistance     Problem: ABCDS Injury Assessment  Goal: Absence of physical injury  Outcome: Progressing  Flowsheets (Taken 1/8/2023 2115)  Absence of Physical Injury: Implement safety measures based on patient assessment     Problem: Safety - Adult  Goal: Free from fall injury  Outcome: Progressing  Flowsheets (Taken 1/8/2023 2115)  Free From Fall Injury: Instruct family/caregiver on patient safety

## 2023-01-09 NOTE — PATIENT CARE CONFERENCE
P Quality Flow/Interdisciplinary Rounds Progress Note        Quality Flow Rounds held on January 9, 2023    Disciplines Attending:  Bedside Nurse, , , and Nursing Unit Leadership    Jacquelin Kang was admitted on 12/24/2022  2:40 PM    Anticipated Discharge Date:       Disposition:    Erlin Score:  Erlin Scale Score: 20    Readmission Risk              Risk of Unplanned Readmission:  15           Discussed patient goal for the day, patient clinical progression, and barriers to discharge.   The following Goal(s) of the Day/Commitment(s) have been identified:  Labs - Report Results      Da Cisneros RN  January 9, 2023

## 2023-01-09 NOTE — PROGRESS NOTES
Occupational Therapy  OT BEDSIDE TREATMENT NOTE   [facility] [address]  [address] [place] [address]        [id number]  Patient Name: Zabrina Pichardo  MRN: [id number]  : 1945  Room: [address] [place], [place] [address]     Per OT Eval:    Evaluating OT: Kun Signs, OTR/L 8934     Referring Provider: TITUS Bunn   Specific Provider Orders/Date: OT eval and treat (1/3/23)        Diagnosis: NSTEMI (non-ST elevated myocardial infarction) ([facility] [address] [I21.4]        Surgery/Procedures: 1/3  Urgent sternotomy/Urgent CABG x 3     Pertinent Medical History:    Past Medical History        Past Medical History:   Diagnosis Date    ARJUN (cerebral atherosclerosis)      History of blood transfusion      Hyperlipidemia      Hypertension           *Precautions:  Fall Risk, sternal,    Assessment of current deficits   [x]Functional mobility            [x]ADLs           [x]Strength                  []Cognition  [x]Functional transfers          [x]IADLs          [x]Safety Awareness   [x]Endurance  []Fine Motor Coordination   [x]Balance       []Vision/perception    []Sensation      []Gross Motor Coordination [x]ROM           []Delirium                  [] Motor Control     []Communication      OT PLAN OF CARE   OT POC based on physician orders, patient diagnosis and results of clinical assessment.         Frequency/Duration: 1-3 days/wk for 1-2 weeks PRN     Specific OT Treatment to include:   ADL retraining/adapted techniques and AE recommendations to increase functional independence within precautions                    Energy conservation techniques to improve tolerance for selfcare routine   Functional transfer/mobility training/DME recommendations for increased independence, safety and fall prevention         Patient/family education to increase safety and functional independence within precautions             Environmental modifications for safe mobility and completion of ADLs Therapeutic activity to improve functional performance during ADLs                                         Therapeutic exercise to improve tolerance and functional strength for ADLs   Balance retraining exercises/tasks for facilitation of postural control with dynamic challenges during ADLs . Recommended Adaptive Equipment:  LB dressing AE pending progress, shower seat for energy conservation. Home Living: Pt lives with granddaughter  in a 2 floor plan with porch +2 step(s) to enter and 1 rail(s); bed/bath on 2nd floor: flight with partial rail  Bathroom setup: tub/shower; higher commode seat  Equipment owned: reacher      Prior Level of Function: IND with ADLs;  IND with IADLs. No device for ambulation. Driving: yes  Occupation: retired ; owned a [employer]      Pain Level: Pt complained of headache pain this session     Cognition: A&O: 4/4    Follows 1-2 step commands appropriately.               Memory: Good              Comprehension Good              Problem solving: Fair+/Good              Judgement/safety: Fair+/Good                 Communication skills: WFL              Vision: WFL                     Glasses: yes                                                    Hearing: WFL                RASS: 0  CAM-ICU: (NT) Delirium      UE Assessment:  Hand Dominance: Right [x]  Left []       ROM Strength STM goal: PRN   RUE  Grossly WFL within precautions Not formally tested; grossly WFL              WNL for ADLS      LUE Grossly WFL within precautions Not formally tested; grossly WFL              WNL for ADLS         Sensation: No c/o numbness or tingling in extremities   Tone: WNL   Edema: [facility]     Functional Assessment: AM-PAC Daily Activity Raw Score: 18/24    Initial Eval Status  Date: 1/4/23 Treatment Status  Date: 1/9/23 STG=LTG  Time Frame: 5-7days   Feeding S; set up Setup                      IND  while seated up in chair to increase activity tolerance Grooming Min A  Set up SBA  Pt washed face, applied deodorant, combed hair seated upright in chair                       Donnie   while standing sink level demonstrating G tolerance; G balance. UB dressing/bathing Max A Min A  For sponge bathing tasks seated and doffing/donning gown. Education on sequencing and chin tuck method to maintain sternal prec. Donnie   demonstrating G knowledge of precautions during tasks      LB dressing/bathing Max A Min A  For LB sponge bathing tasks and to taryn/doff hospital socks seated upright in chair using cross leg tech to maintain sternal prec. Donnie  using AE as needed for safe reach/ energy conservation        Toileting NT Min A  simulated                      Donnie      Bed Mobility  Supine to sit: Max A     Sit to supine:   NT modA  Supine<>EOB  Per last tx  Pt sitting up in chair upon arrival this date                     Min A  in prep of ADL tasks & transfers   Functional Transfers Sit to stand: Min A  from higher bed surface;     Stand to sit: Min A  Ezequiel  Sit to Stand  Stand to Sit    Cueing for body mechanics to maintain of sternal precautions                      Donnie  sit<>stand/functional bathroom transfers using AD/DME as needed for balance and safety   Functional Mobility Min A  no device  Ezequiel  Pt ambulated short household distance in room EOB<>Doorway, slow pace, rest breaks as needed                       Donnie   functional/bathroom mobility using AD as needed & demonstrating G safety      Balance Sitting:     Static:  SBA    Dynamic:Min A  Standing: Min A  Sitting:  SBA    Standing:  Min A Donnie dynamic sitting balance; Donnie dynamic standing balance  during ADL tasks & transfers   Endurance/  Activity Tolerance    F tolerance with light activity.    Fair G   tolerance with moderate activity/self care routine   Visual/  Perceptual               WFL                                  Comments: Upon arrival pt sitting in chair. ADL retraining to increase safety and indep in dressing and bathing tasks, balance and trf training to increase participation in functional mobility and standing aspects of ADLs with increased safety. Pt educated on techniques to increase independence and safety while adhering to sternal precautions during ADL's, bed mobility, and functional transfers. At end of session pt left seated in bedside chair, call light within reach. Pt has made fair progress towards set goals.    Continue with current plan of care focusing on increasing of independency with transfers and ADL tasks      Treatment Time In: 9:25am           Treatment Time Out: 9:48am                Treatment Charges: Mins Units   Ther Ex  [id number]     Manual Therapy 02498     Thera Activities 16280     ADL/Home Mgt 89824 23 2   Neuro Re-ed 45144     Group Therapy      Orthotic manage/training  [id number]     Non-Billable Time     Total Timed Treatment 23 2        [address] TAVAREZ/L 54421

## 2023-01-09 NOTE — PROGRESS NOTES
Dr Kamilah Washington re: something for anxiety. Will await return call or new orders. Ganesh Sin RN

## 2023-01-11 NOTE — DISCHARGE SUMMARY
Physician Discharge Summary     Patient ID:  Fatemeh Ordonez  [id number]  65 y.o.  1945    Admit date: 12/24/2022    Discharge date and time: 1/9/2023  1:19 PM     Admitting Physician: Glenis Josue MD     Discharge Physician: Bell Nina MD    Discharge Diagnoses:   POSTOPERATIVE DIAGNOSES:  Non-ST-elevation MI, severe multivessel coronary artery disease, carotid artery stenosis, hypertension, hyperlipidemia, possible GI bleed. OPERATIONS PERFORMED:  1. Urgent sternotomy. 2.  Urgent coronary artery bypass grafting x3, left internal mammary  artery to the LAD, CryoVein to the obtuse marginal, CryoVein to the  right coronary artery. 3.  Left atrial appendage occlusion with a 35-mm AtriClip. 4.  Rigid internal fixation of sternum using Eloy plates x2. Discharged Condition: stable    Indication for Admission:   Non-ST-elevation MI, severe multivessel  coronary artery disease, carotid artery stenosis, hypertension,  hyperlipidemia, possible GI bleed. Hospital Course: Course as above, and on 1/3/23 the patient underwent CABG x3 on (LIMA-LAD, CryoVein-OM, CryoVein-RCA)/MYNOR exclusion with 35mm AtriClip. Postoperatively, she was transferred to the CVICU in guarded stable condition and extubated once indications met. Once appropriate, she was transferred to the monitored stepdown unit and beta blockade was initiated. The mediastinal/pleural chest tubes, and epicardial pacing wires were discontinued in the usual fashion. TTE on 1/6/23  revealed an EF of 45-50% mild MR and moderate TR. WCD was not indicated on discharge. She experienced post operative atrial fibrillation and was treated with amiodarone, cardizem, beta blockade, and eliquis. She reverted back to NSR prior to discharge. Supplemental oxygen was weaned off, all ambulatory requirements were met, and she was deemed ready for discharge. She was discharged to home on POD 6 (1/9/23) in stable condition with a post-operative appointment scheduled. Consults: cardiology    Discharge Exam:  Vitals          Vitals:     01/08/23 1010 01/08/23 1224 01/08/23 1250 01/08/23 1514   BP:   128/65   (!) 127/58   Pulse:   73   73   Resp:   18   18   Temp:   98.7 °F (37.1 °C)   98.8 °F (37.1 °C)   TempSrc:   Oral   Oral   SpO2: 96% 92% 97% 94%   Weight:           Height:                 O2: none        Intake/Output Summary (Last 24 hours) at 1/8/2023 1607  Last data filed at 1/8/2023 0600      Gross per 24 hour   Intake 600 ml   Output 200 ml   Net 400 ml            +BM on 1/8     UO: Voids without difficulty              Recent Labs     01/06/23  0542 01/07/23  0649 01/08/23  0651   WBC 11.2 11.9* 11.4   HGB 9.0* 9.3* 10.3*   HCT 28.5* 29.4* 31.9*    312 456*            Recent Labs     01/06/23  0542 01/07/23  0649 01/08/23  0651   BUN 20 24* 24*   CREATININE 0.7 0.7 0.7            Telemetry: SR        PE  Cardiac: RRR  Lungs: decreased bases  Chest incision C/D/I, approximated, no erythema. Sternum stable. Prior chest tube site incisions C/D/I, no erythema with intact sutures. Epicardial pacing wires present and secure. Abd: Soft, nontender, +BS  Ext: Incisions C/D/I, approximated, no erythema, + mild edema            Disposition: home    Patient Instructions:      Medication List        START taking these medications      ALPRAZolam 0.25 MG tablet  Commonly known as: Xanax  Take 1 tablet by mouth 3 times daily as needed for Sleep for up to 3 days.  Max Daily Amount: 0.75 mg     amiodarone 200 MG tablet  Commonly known as: CORDARONE  Take 2 tablets by mouth See Admin Instructions Take 400 mg orally twice daily for seven days, then take 200 mg orally twice daily for seven days, then take 200 mg orally daily for fourteen days, then discontinue     apixaban 5 MG Tabs tablet  Commonly known as: ELIQUIS  Take 1 tablet by mouth 2 times daily     ascorbic acid 500 MG tablet  Commonly known as: VITAMIN C  Take 1 tablet by mouth 2 times daily     aspirin 81 MG EC tablet  Take 1 tablet by mouth daily     bumetanide 1 MG tablet  Commonly known as: BUMEX  Take 1 tablet by mouth daily     diphenhydrAMINE 25 MG tablet  Commonly known as: BENADRYL  Take 1 tablet by mouth every 8 hours as needed for Itching or Sleep     docusate sodium 100 MG capsule  Commonly known as: COLACE  Take 1 capsule by mouth 2 times daily as needed for Constipation     ferrous sulfate 325 (65 Fe) MG tablet  Commonly known as: IRON 325  Take 1 tablet by mouth 2 times daily (with meals)     folic acid 1 MG tablet  Commonly known as: FOLVITE  Take 1 tablet by mouth daily     magnesium oxide 400 (240 Mg) MG tablet  Commonly known as: MAG-OX  Take 1 tablet by mouth daily for 14 days     metoprolol succinate 25 MG extended release tablet  Commonly known as: TOPROL XL  Take 1 tablet by mouth in the morning and at bedtime     oxyCODONE-acetaminophen 5-325 MG per tablet  Commonly known as: Percocet  Take 1 tablet by mouth every 6 hours as needed for Pain for up to 7 days. Intended supply: 7 days.  Take lowest dose possible to manage pain Max Daily Amount: 4 tablets     pantoprazole 40 MG tablet  Commonly known as: PROTONIX  Take 1 tablet by mouth daily            CHANGE how you take these medications      simvastatin 20 MG tablet  Commonly known as: ZOCOR  Take 1 tablet by mouth nightly  What changed:   medication strength  how much to take            CONTINUE taking these medications      vitamin E 1000 units capsule            STOP taking these medications      amLODIPine 10 MG tablet  Commonly known as: NORVASC     aspirin-acetaminophen-caffeine 709-352-62 MG per tablet  Commonly known as: EXCEDRIN MIGRAINE     Calcium 1000 + D 1000-20 MG-MCG Tabs  Generic drug: Calcium Carb-Cholecalciferol     DEXTROMETHORPHAN HBR PO     ibuprofen 800 MG tablet  Commonly known as: ADVIL;MOTRIN     isosorbide mononitrate 30 MG extended release tablet  Commonly known as: IMDUR     losartan 50 MG tablet  Commonly known as: COZAAR     MAGNESIUM CHLORIDE-CALCIUM PO     PROMETHAZINE HCL PO     traZODone HCl 150 MG Tb24     Vitamin D 125 MCG (5000 UT) Caps     zolpidem 10 MG tablet  Commonly known as: AMBIEN               Where to Get Your Medications        You can get these medications from any pharmacy    Bring a paper prescription for each of these medications  ALPRAZolam 0.25 MG tablet  amiodarone 200 MG tablet  apixaban 5 MG Tabs tablet  ascorbic acid 500 MG tablet  aspirin 81 MG EC tablet  bumetanide 1 MG tablet  diphenhydrAMINE 25 MG tablet  docusate sodium 100 MG capsule  ferrous sulfate 325 (65 Fe) MG tablet  folic acid 1 MG tablet  magnesium oxide 400 (240 Mg) MG tablet  metoprolol succinate 25 MG extended release tablet  oxyCODONE-acetaminophen 5-325 MG per tablet  pantoprazole 40 MG tablet  simvastatin 20 MG tablet       Activity: sternal precautions  Diet: cardiac diet  Wound Care: as directed    Follow-up with   Future Appointments   Date Time Provider Raji Alatorre   2/7/2023  9:00 AM Ulysses Navy, MD CARDIO SURG [facility]   6/20/2023  2:30 PM [facility] VAS [facility] [address] [facility]   6/20/2023  3:00 PM Cici Reddy MD [facility]       Smoking cessation education provided prior to discharge    Discussed with patient the benefits of participation in cardiac rehab after discharge once approved safe by the surgeon and that a referral will be made at the follow up appointment. Pt verbalized comprehension.     Signed:  Electronically signed by LUCILLE Ryder CNP on 1/11/2023 at 9:32 AM

## 2023-01-18 ENCOUNTER — HOSPITAL ENCOUNTER (OUTPATIENT)
Dept: CARDIAC REHAB | Age: 78
Setting detail: THERAPIES SERIES
Discharge: HOME | End: 2023-01-18

## 2023-01-24 ENCOUNTER — HOSPITAL ENCOUNTER (OUTPATIENT)
Dept: CARDIAC REHAB | Age: 78
Setting detail: THERAPIES SERIES
Discharge: HOME | End: 2023-01-24

## 2023-01-26 ENCOUNTER — HOSPITAL ENCOUNTER (OUTPATIENT)
Dept: CARDIAC REHAB | Age: 78
Setting detail: THERAPIES SERIES
Discharge: HOME | End: 2023-01-26

## 2023-01-30 ENCOUNTER — HOSPITAL ENCOUNTER (OUTPATIENT)
Dept: CARDIAC REHAB | Age: 78
Setting detail: THERAPIES SERIES
Discharge: HOME | End: 2023-01-30

## 2023-02-01 ENCOUNTER — HOSPITAL ENCOUNTER (OUTPATIENT)
Dept: CARDIAC REHAB | Age: 78
Setting detail: THERAPIES SERIES
Discharge: HOME | End: 2023-02-01

## 2023-02-03 ENCOUNTER — HOSPITAL ENCOUNTER (OUTPATIENT)
Dept: CARDIAC REHAB | Age: 78
Setting detail: THERAPIES SERIES
Discharge: HOME | End: 2023-02-03

## 2023-02-03 ASSESSMENT — ENCOUNTER SYMPTOMS
COUGH: 0
SHORTNESS OF BREATH: 0

## 2023-02-03 NOTE — PROGRESS NOTES
Subjective:      Patient ID: Davide Ko is a 78 y.o. female who presents to office for routine 1 month follow up s/p CABG x 3 LIMA-LAD, CryoVein-OM, CryoVein-RCA)/MYNOR exclusion with 35mm AtriClip on 1/3/23. She did experience post op afib and was DC'd on eliquis. She was DC'd home on POD 6  She states she has been doing well and denies any CP, SOB or incisional issues. HPI    Review of Systems   Constitutional:  Negative for chills and fever. Respiratory:  Negative for cough and shortness of breath. Cardiovascular:  Negative for chest pain, palpitations and leg swelling. Neurological:  Negative for syncope and light-headedness. Objective:   Physical Exam  Constitutional:       Appearance: Normal appearance. Cardiovascular:      Rate and Rhythm: Normal rate and regular rhythm. Pulses: Normal pulses. Heart sounds: Normal heart sounds. Pulmonary:      Effort: Pulmonary effort is normal.      Breath sounds: Normal breath sounds. Comments: midsternal and chest tube incisions well healed without evidence of infection. Sternum stable. Abdominal:      General: Bowel sounds are normal.   Musculoskeletal:         General: No swelling. Normal range of motion. Cervical back: Normal range of motion and neck supple. Neurological:      Mental Status: She is alert and oriented to person, place, and time. Assessment:      S/p CABG x 3       Plan:      Remove sternal precautions after 6 weeks post op   Cardiac rehab referral  Continue follow up with PCP, Cardiology as scheduled. Encouraged to call office with any questions, concerns. Otherwise no further follow up necessary from CTS standpoint.             Agapito Fajardo PA-C

## 2023-02-07 ENCOUNTER — OFFICE VISIT (OUTPATIENT)
Dept: CARDIOTHORACIC SURGERY | Age: 78
End: 2023-02-07

## 2023-02-07 VITALS
DIASTOLIC BLOOD PRESSURE: 69 MMHG | WEIGHT: 142 LBS | BODY MASS INDEX: 24.24 KG/M2 | SYSTOLIC BLOOD PRESSURE: 136 MMHG | HEART RATE: 60 BPM | HEIGHT: 64 IN

## 2023-02-07 DIAGNOSIS — Z95.1 S/P CABG (CORONARY ARTERY BYPASS GRAFT): Primary | ICD-10-CM

## 2023-02-07 PROCEDURE — 99024 POSTOP FOLLOW-UP VISIT: CPT | Performed by: THORACIC SURGERY (CARDIOTHORACIC VASCULAR SURGERY)

## 2023-03-07 ENCOUNTER — OFFICE VISIT (OUTPATIENT)
Dept: VASCULAR SURGERY | Age: 78
End: 2023-03-07
Payer: MEDICARE

## 2023-03-07 VITALS — WEIGHT: 147 LBS | HEIGHT: 64 IN | BODY MASS INDEX: 25.1 KG/M2

## 2023-03-07 DIAGNOSIS — I65.23 BILATERAL CAROTID ARTERY STENOSIS: Primary | ICD-10-CM

## 2023-03-07 PROCEDURE — G8427 DOCREV CUR MEDS BY ELIG CLIN: HCPCS

## 2023-03-07 PROCEDURE — 1036F TOBACCO NON-USER: CPT

## 2023-03-07 PROCEDURE — G8484 FLU IMMUNIZE NO ADMIN: HCPCS

## 2023-03-07 PROCEDURE — 1090F PRES/ABSN URINE INCON ASSESS: CPT

## 2023-03-07 PROCEDURE — G8417 CALC BMI ABV UP PARAM F/U: HCPCS

## 2023-03-07 PROCEDURE — 3017F COLORECTAL CA SCREEN DOC REV: CPT

## 2023-03-07 PROCEDURE — G8400 PT W/DXA NO RESULTS DOC: HCPCS

## 2023-03-07 PROCEDURE — 99213 OFFICE O/P EST LOW 20 MIN: CPT

## 2023-03-07 PROCEDURE — 1123F ACP DISCUSS/DSCN MKR DOCD: CPT

## 2023-03-07 NOTE — PROGRESS NOTES
Vascular Surgery Outpatient Progress Note      Chief Complaint   Patient presents with    Circulatory Problem     Bilateral carotid artery stenosis       HISTORY OF PRESENT ILLNESS:                The patient is a 78 y.o. female who returns for follow-up evaluation of carotid artery disease. She denies any symptoms of stroke, ministroke, or amaurosis fugax. She had CABG in 1/2023. During her preop workup a carotid US was done. Pt was to follow up in June of this year with a carotid US but was advised by her CT surgeon to come in sooner. She is recovering well from her CABG. She is on asa, eliquis and statin. Past Medical History:        Diagnosis Date    ARJUN (cerebral atherosclerosis)     History of blood transfusion     Hyperlipidemia     Hypertension      Past Surgical History:        Procedure Laterality Date    CARDIAC CATHETERIZATION  12/30/2022    Dr. Mark Bagley Fus N/A 1/3/2023    CABG CORONARY ARTERY BYPASS, BRISEIDA performed by Bernardo Paz MD at . [address] [address] N/A 12/27/2022    EGD ESOPHAGOGASTRODUODENOSCOPY performed by Carlyle Georges MD at [address] [place] [address]     Current Medications:   Prior to Admission medications    Medication Sig Start Date End Date Taking?  Authorizing Provider   apixaban (ELIQUIS) 5 MG TABS tablet Take 1 tablet by mouth 2 times daily 2/27/23  Yes Nena Coon MD   aspirin 81 MG EC tablet Take 1 tablet by mouth daily 1/9/23  Yes LUCILLE Arreola CNP   simvastatin (ZOCOR) 20 MG tablet Take 1 tablet by mouth nightly 1/9/23  Yes LUCILLE Arreola CNP   metoprolol succinate (TOPROL XL) 25 MG extended release tablet Take 1 tablet by mouth in the morning and at bedtime 1/9/23  Yes LUCILLE Arreola CNP   amiodarone (CORDARONE) 200 MG tablet Take 2 tablets by mouth See Admin Instructions Take 400 mg orally twice daily for seven days, then take 200 mg orally twice daily for seven days, then take 200 mg orally daily for fourteen days, then discontinue  Patient not taking: Reported on 3/7/2023 1/9/23   LUCILLE Sood CNP   bumetanide (BUMEX) 1 MG tablet Take 1 tablet by mouth daily  Patient not taking: Reported on 3/7/2023 1/9/23   LUCILLE Sood CNP   magnesium oxide (MAG-OX) 400 (240 Mg) MG tablet Take 1 tablet by mouth daily for 14 days 1/9/23 1/23/23  LUCILLE Sood CNP   pantoprazole (PROTONIX) 40 MG tablet Take 1 tablet by mouth daily 1/9/23 2/8/23  LUCILLE Sood CNP   ascorbic acid (VITAMIN C) 500 MG tablet Take 1 tablet by mouth 2 times daily 1/9/23 2/8/23  LUCILLE Sood CNP   vitamin E 1000 units capsule Take 1,000 Units by mouth daily  Patient not taking: Reported on 3/7/2023    Historical Provider, MD     Allergies:  Lipitor [atorvastatin]    Social History     Socioeconomic History    Marital status:      Spouse name: Not on file    Number of children: Not on file    Years of education: Not on file    Highest education level: Not on file   Occupational History    Not on file   Tobacco Use    Smoking status: Never    Smokeless tobacco: Never   Vaping Use    Vaping Use: Never used   Substance and Sexual Activity    Alcohol use: No    Drug use: No    Sexual activity: Not on file   Other Topics Concern    Not on file   Social History Narrative    Not on file     Social Determinants of Health     Financial Resource Strain: Not on file   Food Insecurity: Not on file   Transportation Needs: Not on file   Physical Activity: Not on file   Stress: Not on file   Social Connections: Not on file   Intimate Partner Violence: Not on file   Housing Stability: Not on file        No family history on file.     REVIEW OF SYSTEMS (New symptoms):    Eyes:      Blurred vision:  No [x]/Yes []               Diplopia:   No [x]/Yes []               Vision loss:       No [x]/Yes []   Ears, nose, throat:             Hearing loss:    No [x]/Yes []      Vertigo:   No [x]/Yes []                       Swallowing problem:  No [x]/Yes []               Nose bleeds:   No [x]/Yes []      Voice hoarseness:  No [x]/Yes []  Respiratory:             Cough:   No [x]/Yes []      Pleuritic chest pain:  No [x]/Yes []                        Dyspnea:   No [x]/Yes []      Wheezing:   No [x]/Yes []  Cardiovascular:             Angina:   No [x]/Yes []      Palpitations:   No [x]/Yes []          Claudication:    No [x]/Yes []      Leg swelling:   No [x]/Yes []  Gastrointestinal:             Nausea or vomiting:  No [x]/Yes []               Abdominal pain:  No [x]/Yes []                     Intestinal bleeding: No [x]/Yes []  Musculoskeletal:             Leg pain:   No [x]/Yes []      Back pain:   No [x]/Yes []                    Weakness:   No [x]/Yes []  Neurologic:             Numbness:   No [x]/Yes []      Paralysis:   No [x]/Yes []                       Headaches:   No [x]/Yes []  Hematologic, lymphatic:   Anemia:   No [x]/Yes []              Bleeding or bruising:  No [x]/Yes []              Fevers or chills: No [x]/Yes []  Endocrine:             Temp intolerance:   No [x]/Yes []                       Polydipsia, polyuria:  No [x]/Yes []  Skin:              Rash:    No [x]/Yes []      Ulcers:   No [x]/Yes []              Abnorm pigment: No [x]/Yes []  :              Frequency/urgency:  No [x]/Yes []      Hematuria:    No [x]/Yes []                      Incontinence:    No [x]/Yes []    PHYSICAL EXAM:  There were no vitals filed for this visit.   General Appearance: alert and oriented to person, place and time, in no acute distress, well developed and well- nourished  Neurologic: no cranial nerve deficit, speech normal  Head: normocephalic and atraumatic  Eyes: extraocular eye movements intact, conjunctivae normal  ENT: external ear and ear canal normal bilaterally, nose without deformity,   Pulmonary/Chest: normal air movement, no respiratory distress  Cardiovascular: normal rate, regular rhythm  Abdomen: non-distended, no masses  Musculoskeletal: no joint deformity or tenderness  Extremities: no leg edema bilaterally  Skin: warm and dry, no rash or erythema    PULSE EXAM      Right      Left   Brachial     Radial     Femoral     Popliteal     Dorsalis Pedis     Posterior Tibial     (3=normal, 2=diminished, 1=barely palpable, 4=widened)    RADIOLOGY: Carotid US:  Right 0-49%. Left 50-69%. Problem List Items Addressed This Visit          Circulatory    Bilateral carotid artery stenosis - Primary    Relevant Orders    VL DUP CAROTID BILATERAL     I reviewed with the patient that she does have calcium buildup in the carotid arteries but does not appear to have a hemodynamically significant stenosis. After review of her CTA compared to the carotid US there is no significant change. I do not feel that she requires any additional testing or intervention at this time. I will plan to see her again in December of this year (1 year after previous US) with a repeat ultrasound but asked her to call sooner with any problems. I reviewed the natural history and treatment options of carotid artery disease. I reviewed the signs and symptoms of stroke, and instructions if symptoms occur.     Pt seen and plan reviewed with Dr. Melissa White, LUCILLE - CNP

## 2023-03-23 ENCOUNTER — OFFICE VISIT (OUTPATIENT)
Dept: CARDIOLOGY CLINIC | Age: 78
End: 2023-03-23
Payer: MEDICARE

## 2023-03-23 VITALS
HEIGHT: 64 IN | SYSTOLIC BLOOD PRESSURE: 152 MMHG | BODY MASS INDEX: 24.59 KG/M2 | HEART RATE: 55 BPM | RESPIRATION RATE: 12 BRPM | DIASTOLIC BLOOD PRESSURE: 80 MMHG | WEIGHT: 144 LBS

## 2023-03-23 DIAGNOSIS — I48.0 PAF (PAROXYSMAL ATRIAL FIBRILLATION) (HCC): ICD-10-CM

## 2023-03-23 DIAGNOSIS — I25.810 CORONARY ARTERY DISEASE INVOLVING CORONARY BYPASS GRAFT OF NATIVE HEART WITHOUT ANGINA PECTORIS: Primary | ICD-10-CM

## 2023-03-23 DIAGNOSIS — I50.22 CHRONIC HFREF (HEART FAILURE WITH REDUCED EJECTION FRACTION) (HCC): ICD-10-CM

## 2023-03-23 DIAGNOSIS — E78.2 MIXED HYPERLIPIDEMIA: ICD-10-CM

## 2023-03-23 DIAGNOSIS — I25.5 ISCHEMIC CARDIOMYOPATHY: ICD-10-CM

## 2023-03-23 DIAGNOSIS — I10 PRIMARY HYPERTENSION: ICD-10-CM

## 2023-03-23 PROCEDURE — 1090F PRES/ABSN URINE INCON ASSESS: CPT | Performed by: INTERNAL MEDICINE

## 2023-03-23 PROCEDURE — 1036F TOBACCO NON-USER: CPT | Performed by: INTERNAL MEDICINE

## 2023-03-23 PROCEDURE — 99214 OFFICE O/P EST MOD 30 MIN: CPT | Performed by: INTERNAL MEDICINE

## 2023-03-23 PROCEDURE — G8427 DOCREV CUR MEDS BY ELIG CLIN: HCPCS | Performed by: INTERNAL MEDICINE

## 2023-03-23 PROCEDURE — 3017F COLORECTAL CA SCREEN DOC REV: CPT | Performed by: INTERNAL MEDICINE

## 2023-03-23 PROCEDURE — 3079F DIAST BP 80-89 MM HG: CPT | Performed by: INTERNAL MEDICINE

## 2023-03-23 PROCEDURE — G8484 FLU IMMUNIZE NO ADMIN: HCPCS | Performed by: INTERNAL MEDICINE

## 2023-03-23 PROCEDURE — 3077F SYST BP >= 140 MM HG: CPT | Performed by: INTERNAL MEDICINE

## 2023-03-23 PROCEDURE — 1123F ACP DISCUSS/DSCN MKR DOCD: CPT | Performed by: INTERNAL MEDICINE

## 2023-03-23 PROCEDURE — G8420 CALC BMI NORM PARAMETERS: HCPCS | Performed by: INTERNAL MEDICINE

## 2023-03-23 PROCEDURE — 93000 ELECTROCARDIOGRAM COMPLETE: CPT | Performed by: INTERNAL MEDICINE

## 2023-03-23 PROCEDURE — G8400 PT W/DXA NO RESULTS DOC: HCPCS | Performed by: INTERNAL MEDICINE

## 2023-03-23 RX ORDER — ZOLPIDEM TARTRATE 6.25 MG/1
TABLET, FILM COATED, EXTENDED RELEASE ORAL
COMMUNITY
Start: 2023-02-27

## 2023-03-23 NOTE — PROGRESS NOTES
atherosclerosis)     History of blood transfusion     Hyperlipidemia     Hypertension        Past Surgical History:  Past Surgical History:   Procedure Laterality Date    C[facility]  [facility]    CABG CORONARY ARTERY BYPASS, BRISEIDA performed by En Urbina MD at . [facility] N/A 12/27/2022    EGD ESOPHAGOGASTRODUODENOSCOPY performed by Marvin Fagan MD at [address] History:  History reviewed. No pertinent family history. Social History:  Social History     Socioeconomic History    Marital status:      Spouse name: Not on file    Number of children: Not on file    Years of education: Not on file    Highest education level: Not on file   Occupational History    Not on file   Tobacco Use    Smoking status: Never    Smokeless tobacco: Never   Vaping Use    Vaping Use: Never used   Substance and Sexual Activity    Alcohol use: No    Drug use: No    Sexual activity: Not on file   Other Topics Concern    Not on file   Social History Narrative    Not on file     Social Determinants of Health     Financial Resource Strain: Not on file   Food Insecurity: Not on file   Transportation Needs: Not on file   Physical Activity: Not on file   Stress: Not on file   Social Connections: Not on file   Intimate Partner Violence: Not on file   Housing Stability: Not on file       Allergies:   Allergies   Allergen Reactions    Lipitor [Atorvastatin]      Caused my bones to ache        Current Medications:  Current Outpatient Medications   Medication Sig Dispense Refill    apixaban (ELIQUIS) 5 MG TABS tablet Take 1 tablet by mouth 2 times daily 180 tablet 3    aspirin 81 MG EC tablet Take 1 tablet by mouth daily 30 tablet 3    amiodarone (CORDARONE) 200 MG tablet Take 2 tablets by mouth See Admin Instructions Take 400 mg orally twice daily for seven days, then take 200 mg

## 2023-03-27 RX ORDER — METOPROLOL SUCCINATE 25 MG/1
25 TABLET, EXTENDED RELEASE ORAL 2 TIMES DAILY
Qty: 180 TABLET | Refills: 3 | Status: SHIPPED | OUTPATIENT
Start: 2023-03-27

## 2023-05-02 RX ORDER — SIMVASTATIN 20 MG
20 TABLET ORAL NIGHTLY
Qty: 90 TABLET | Refills: 3 | Status: SHIPPED | OUTPATIENT
Start: 2023-05-02

## 2023-08-14 ENCOUNTER — TELEPHONE (OUTPATIENT)
Dept: CARDIOLOGY CLINIC | Age: 78
End: 2023-08-14

## 2023-08-14 NOTE — TELEPHONE ENCOUNTER
Patient states that her bp has been fluctuating in the past few days:    136/69  107/65  161/75  175/60  131/73  130/70  Please advise

## 2023-08-15 RX ORDER — VALSARTAN 80 MG/1
80 TABLET ORAL DAILY
COMMUNITY
End: 2023-08-16 | Stop reason: SDUPTHER

## 2023-08-16 RX ORDER — VALSARTAN 80 MG/1
80 TABLET ORAL DAILY
Qty: 90 TABLET | Refills: 1 | Status: SHIPPED | OUTPATIENT
Start: 2023-08-16

## 2023-08-16 RX ORDER — VALSARTAN 80 MG/1
80 TABLET ORAL DAILY
Qty: 30 TABLET | Refills: 2 | Status: SHIPPED
Start: 2023-08-16 | End: 2023-08-16 | Stop reason: SDUPTHER

## 2023-08-18 ENCOUNTER — HOSPITAL ENCOUNTER (OUTPATIENT)
Dept: ULTRASOUND IMAGING | Age: 78
End: 2023-08-18
Payer: MEDICARE

## 2023-08-18 ENCOUNTER — HOSPITAL ENCOUNTER (OUTPATIENT)
Age: 78
End: 2023-08-18
Payer: MEDICARE

## 2023-08-18 DIAGNOSIS — I73.9 PERIPHERAL VASCULAR DISEASE, UNSPECIFIED (HCC): ICD-10-CM

## 2023-08-18 PROCEDURE — 93925 LOWER EXTREMITY STUDY: CPT

## 2023-09-01 ENCOUNTER — HOSPITAL ENCOUNTER (OUTPATIENT)
Dept: CT IMAGING | Age: 78
End: 2023-09-01
Payer: MEDICARE

## 2023-09-01 DIAGNOSIS — I73.9 PERIPHERAL VASCULAR DISEASE, UNSPECIFIED (HCC): ICD-10-CM

## 2023-09-01 PROCEDURE — 73706 CT ANGIO LWR EXTR W/O&W/DYE: CPT

## 2023-09-01 PROCEDURE — 2580000003 HC RX 258: Performed by: RADIOLOGY

## 2023-09-01 PROCEDURE — 6360000004 HC RX CONTRAST MEDICATION: Performed by: RADIOLOGY

## 2023-09-01 RX ORDER — SODIUM CHLORIDE 0.9 % (FLUSH) 0.9 %
10 SYRINGE (ML) INJECTION PRN
Status: DISCONTINUED | OUTPATIENT
Start: 2023-09-01 | End: 2023-09-04 | Stop reason: HOSPADM

## 2023-09-01 RX ORDER — VALSARTAN 80 MG/1
80 TABLET ORAL DAILY
Qty: 90 TABLET | Refills: 1 | Status: SHIPPED | OUTPATIENT
Start: 2023-09-01

## 2023-09-01 RX ADMIN — IOPAMIDOL 120 ML: 755 INJECTION, SOLUTION INTRAVENOUS at 13:01

## 2023-09-01 RX ADMIN — SODIUM CHLORIDE, PRESERVATIVE FREE 10 ML: 5 INJECTION INTRAVENOUS at 13:01

## 2023-11-09 ENCOUNTER — OFFICE VISIT (OUTPATIENT)
Dept: CARDIOLOGY CLINIC | Age: 78
End: 2023-11-09
Payer: MEDICARE

## 2023-11-09 VITALS
HEART RATE: 70 BPM | WEIGHT: 163.2 LBS | RESPIRATION RATE: 16 BRPM | BODY MASS INDEX: 27.86 KG/M2 | DIASTOLIC BLOOD PRESSURE: 68 MMHG | HEIGHT: 64 IN | SYSTOLIC BLOOD PRESSURE: 140 MMHG

## 2023-11-09 DIAGNOSIS — I10 PRIMARY HYPERTENSION: ICD-10-CM

## 2023-11-09 DIAGNOSIS — I25.810 CORONARY ARTERY DISEASE INVOLVING CORONARY BYPASS GRAFT OF NATIVE HEART WITHOUT ANGINA PECTORIS: Primary | ICD-10-CM

## 2023-11-09 DIAGNOSIS — I48.0 PAF (PAROXYSMAL ATRIAL FIBRILLATION) (HCC): ICD-10-CM

## 2023-11-09 DIAGNOSIS — I10 ESSENTIAL HYPERTENSION: ICD-10-CM

## 2023-11-09 DIAGNOSIS — I25.5 ISCHEMIC CARDIOMYOPATHY: ICD-10-CM

## 2023-11-09 PROCEDURE — 1036F TOBACCO NON-USER: CPT | Performed by: INTERNAL MEDICINE

## 2023-11-09 PROCEDURE — 99214 OFFICE O/P EST MOD 30 MIN: CPT | Performed by: INTERNAL MEDICINE

## 2023-11-09 PROCEDURE — G8417 CALC BMI ABV UP PARAM F/U: HCPCS | Performed by: INTERNAL MEDICINE

## 2023-11-09 PROCEDURE — G8427 DOCREV CUR MEDS BY ELIG CLIN: HCPCS | Performed by: INTERNAL MEDICINE

## 2023-11-09 PROCEDURE — G8400 PT W/DXA NO RESULTS DOC: HCPCS | Performed by: INTERNAL MEDICINE

## 2023-11-09 PROCEDURE — 93000 ELECTROCARDIOGRAM COMPLETE: CPT | Performed by: INTERNAL MEDICINE

## 2023-11-09 PROCEDURE — G8484 FLU IMMUNIZE NO ADMIN: HCPCS | Performed by: INTERNAL MEDICINE

## 2023-11-09 PROCEDURE — 3077F SYST BP >= 140 MM HG: CPT | Performed by: INTERNAL MEDICINE

## 2023-11-09 PROCEDURE — 1090F PRES/ABSN URINE INCON ASSESS: CPT | Performed by: INTERNAL MEDICINE

## 2023-11-09 PROCEDURE — 1123F ACP DISCUSS/DSCN MKR DOCD: CPT | Performed by: INTERNAL MEDICINE

## 2023-11-09 PROCEDURE — 3078F DIAST BP <80 MM HG: CPT | Performed by: INTERNAL MEDICINE

## 2023-11-09 RX ORDER — CILOSTAZOL 100 MG/1
1 TABLET ORAL 2 TIMES DAILY
COMMUNITY
Start: 2023-10-04

## 2023-11-09 NOTE — PROGRESS NOTES
OUTPATIENT CARDIOLOGY FOLLOW-UP    Name: Chad Escobar    Age: 70 y.o. Date of Service: 11/9/2023    Chief Complaint: Follow-up for CAD s/p CABG, ischemic CM, HFrEF, PAF    Referring Physician: Luba Mora MD    History of Present Illness:  She was previously a part time  at [employer] (retired). At the time of her prior office visit, she reported a > 1 year history of progressive fatigue, which she attributed to increased stress at home -- (24 year old granddaughter living with her, her  previously had prostate CA and lung CA) --> her  passed away in 5/2019, granddaughter initially went to college in [facility] and then [employer] (then on academic suspension -- she currently works at [employer]). At the time of her 12/2022 office visit, she reported a ~ 7 month history of WALKER with moderate levels of exertion and exertional chest pain (\"discomfort\", mid-sternal, more frequent episodes recently). She denied a history of palpitations, syncope, PND, or orthopnea. Outpatient cardiac catheterization was scheduled --> patient suffered an MI prior to scheduled cath date --> inpatient cardiac catheterization demonstrated multivessel CAD. Multivessel CAD/NSTEMI in 12/2022 s/p CABG x 3 (LIMA-LAD, CryoVein-OM, CryoVein-RCA) and MYNOR exclusion with 35mm AtriClip on 1/3/23. +MSK chest pain. No new cardiac complaints otherwise. She's been increasing her activity level / she recently purchased a treadmill (although she's not been using it). SR on EKG.     Review of Systems:  Cardiac: As per HPI  General: No fever, chills  Pulmonary: As per HPI  HEENT: No visual disturbances, difficult swallowing  GI: No nausea, vomiting  : No dysuria, hematuria  Endocrine: No thyroid disease or DM  Musculoskeletal: XIONG x 4, no focal motor deficits  Skin: Intact, no rashes  Neuro: No headache, seizures  Psych: Currently with no depression, anxiety    Past Medical History:  Past Medical History:

## 2023-12-13 DIAGNOSIS — I65.23 BILATERAL CAROTID ARTERY STENOSIS: Primary | ICD-10-CM

## 2024-02-12 RX ORDER — APIXABAN 5 MG/1
5 TABLET, FILM COATED ORAL 2 TIMES DAILY
Qty: 180 TABLET | Refills: 3 | Status: SHIPPED | OUTPATIENT
Start: 2024-02-12

## 2024-02-14 RX ORDER — VALSARTAN 80 MG/1
80 TABLET ORAL DAILY
Qty: 90 TABLET | Refills: 3 | Status: SHIPPED | OUTPATIENT
Start: 2024-02-14

## 2024-03-14 ENCOUNTER — HOSPITAL ENCOUNTER (OUTPATIENT)
Dept: GENERAL RADIOLOGY | Age: 79
Discharge: HOME | End: 2024-03-16
Attending: FAMILY MEDICINE
Payer: MEDICARE

## 2024-03-14 ENCOUNTER — HOSPITAL ENCOUNTER (OUTPATIENT)
Age: 79
Discharge: HOME | End: 2024-03-16
Payer: MEDICARE

## 2024-03-14 DIAGNOSIS — M79.605 LEFT LEG PAIN: ICD-10-CM

## 2024-03-14 PROCEDURE — 73590 X-RAY EXAM OF LOWER LEG: CPT

## 2024-03-19 ENCOUNTER — TELEPHONE (OUTPATIENT)
Dept: CARDIOLOGY CLINIC | Age: 79
End: 2024-03-19

## 2024-03-19 NOTE — TELEPHONE ENCOUNTER
Patient states that he bp started dropping 2days ago, it was 88/58 yesterday and then 70/49, patient was sitting in her kitchen and she passed out,she woke up in her dog's bed on the floor, her bp seems to be improving today with 136/66, please advise

## 2024-03-20 DIAGNOSIS — R55 SYNCOPE AND COLLAPSE: Primary | ICD-10-CM

## 2024-03-20 NOTE — TELEPHONE ENCOUNTER
Please order a 14 day monitor. Continue current medications for now. Will adjust BP medications if episodes of low BP recur.

## 2024-03-21 ENCOUNTER — NURSE ONLY (OUTPATIENT)
Dept: CARDIOLOGY CLINIC | Age: 79
End: 2024-03-21

## 2024-03-21 NOTE — PROGRESS NOTES
Patient was seen today and a 14 day Zio XT monitor was placed. Monitor was ordered by Dr. Vazquez. The monitor was applied. Instructions were given to the patient. Patient stated understanding and gave verbalize feed back.     Monitor company [email or web address]    Serial number [id number]                Steven German MA

## 2024-04-15 DIAGNOSIS — R55 SYNCOPE AND COLLAPSE: ICD-10-CM

## 2024-04-23 ENCOUNTER — TELEPHONE (OUTPATIENT)
Dept: CARDIOLOGY CLINIC | Age: 79
End: 2024-04-23

## 2024-04-23 NOTE — TELEPHONE ENCOUNTER
----- Message from Anupam Vazquez MD sent at 4/23/2024  6:56 AM EDT -----  Sinus rhythm, average HR 79 bpm, no atrial fibrillation, rare PVC's (2 brief episodes of NSVT, longest episode lasted 9 beats/4 seconds), occasional PAC's (PAC burden 1.1%, longest SVT episode lasted 15 seconds). Continue current care for now.

## 2024-05-10 ENCOUNTER — HOSPITAL ENCOUNTER (OUTPATIENT)
Dept: GENERAL RADIOLOGY | Age: 79
End: 2024-05-10
Attending: FAMILY MEDICINE
Payer: MEDICARE

## 2024-05-10 ENCOUNTER — HOSPITAL ENCOUNTER (OUTPATIENT)
Age: 79
End: 2024-05-10
Payer: MEDICARE

## 2024-05-10 DIAGNOSIS — M25.562 LEFT KNEE PAIN, UNSPECIFIED CHRONICITY: ICD-10-CM

## 2024-05-10 DIAGNOSIS — M17.12 LOCALIZED PRIMARY OSTEOARTHRITIS OF LOWER LEG, LEFT: ICD-10-CM

## 2024-05-10 DIAGNOSIS — R26.2 DIFFICULTY IN WALKING INVOLVING LOWER LEG JOINT: ICD-10-CM

## 2024-05-10 PROCEDURE — 73564 X-RAY EXAM KNEE 4 OR MORE: CPT

## 2024-06-21 ENCOUNTER — HOSPITAL ENCOUNTER (OUTPATIENT)
Dept: MAMMOGRAPHY | Age: 79
End: 2024-06-21
Payer: MEDICARE

## 2024-06-21 VITALS — BODY MASS INDEX: 26.22 KG/M2 | HEIGHT: 63 IN | WEIGHT: 148 LBS

## 2024-06-21 DIAGNOSIS — Z12.31 ENCOUNTER FOR SCREENING MAMMOGRAM FOR MALIGNANT NEOPLASM OF BREAST: ICD-10-CM

## 2024-06-21 PROCEDURE — 77063 BREAST TOMOSYNTHESIS BI: CPT

## 2024-07-02 ENCOUNTER — TELEPHONE (OUTPATIENT)
Dept: CARDIOLOGY CLINIC | Age: 79
End: 2024-07-02

## 2024-07-18 ENCOUNTER — OFFICE VISIT (OUTPATIENT)
Dept: CARDIOLOGY CLINIC | Age: 79
End: 2024-07-18
Payer: MEDICARE

## 2024-07-18 VITALS
BODY MASS INDEX: 24.5 KG/M2 | SYSTOLIC BLOOD PRESSURE: 128 MMHG | RESPIRATION RATE: 16 BRPM | HEART RATE: 77 BPM | HEIGHT: 64 IN | DIASTOLIC BLOOD PRESSURE: 70 MMHG | WEIGHT: 143.5 LBS

## 2024-07-18 DIAGNOSIS — R06.09 DOE (DYSPNEA ON EXERTION): ICD-10-CM

## 2024-07-18 DIAGNOSIS — I48.0 PAF (PAROXYSMAL ATRIAL FIBRILLATION) (HCC): ICD-10-CM

## 2024-07-18 DIAGNOSIS — I25.810 CORONARY ARTERY DISEASE INVOLVING CORONARY BYPASS GRAFT OF NATIVE HEART WITHOUT ANGINA PECTORIS: Primary | ICD-10-CM

## 2024-07-18 DIAGNOSIS — I65.23 BILATERAL CAROTID ARTERY STENOSIS: ICD-10-CM

## 2024-07-18 DIAGNOSIS — I25.5 ISCHEMIC CARDIOMYOPATHY: ICD-10-CM

## 2024-07-18 DIAGNOSIS — I50.22 CHRONIC HFREF (HEART FAILURE WITH REDUCED EJECTION FRACTION) (HCC): ICD-10-CM

## 2024-07-18 DIAGNOSIS — I10 PRIMARY HYPERTENSION: ICD-10-CM

## 2024-07-18 PROCEDURE — 3074F SYST BP LT 130 MM HG: CPT | Performed by: INTERNAL MEDICINE

## 2024-07-18 PROCEDURE — G8400 PT W/DXA NO RESULTS DOC: HCPCS | Performed by: INTERNAL MEDICINE

## 2024-07-18 PROCEDURE — 3078F DIAST BP <80 MM HG: CPT | Performed by: INTERNAL MEDICINE

## 2024-07-18 PROCEDURE — 1090F PRES/ABSN URINE INCON ASSESS: CPT | Performed by: INTERNAL MEDICINE

## 2024-07-18 PROCEDURE — G8420 CALC BMI NORM PARAMETERS: HCPCS | Performed by: INTERNAL MEDICINE

## 2024-07-18 PROCEDURE — 1036F TOBACCO NON-USER: CPT | Performed by: INTERNAL MEDICINE

## 2024-07-18 PROCEDURE — 93000 ELECTROCARDIOGRAM COMPLETE: CPT | Performed by: INTERNAL MEDICINE

## 2024-07-18 PROCEDURE — 1123F ACP DISCUSS/DSCN MKR DOCD: CPT | Performed by: INTERNAL MEDICINE

## 2024-07-18 PROCEDURE — 99214 OFFICE O/P EST MOD 30 MIN: CPT | Performed by: INTERNAL MEDICINE

## 2024-07-18 PROCEDURE — G8427 DOCREV CUR MEDS BY ELIG CLIN: HCPCS | Performed by: INTERNAL MEDICINE

## 2024-07-18 NOTE — PROGRESS NOTES
OUTPATIENT CARDIOLOGY FOLLOW-UP    Name: Betsy Monroy    Age: 79 y.o.    Date of Service: 7/18/2024    Chief Complaint: Follow-up for CAD s/p CABG, ischemic CM, HFrEF, PAF    Referring Physician: Nunu Rivera DO    History of Present Illness:  She was previously a part time  at [employer] (retired). At the time of her prior office visit, she reported a > 1 year history of progressive fatigue, which she attributed to increased stress at home -- (18 year old granddaughter living with her, her  previously had prostate CA and lung CA) --> her  passed away in 5/2019, granddaughter initially went to college in [place] and then [facility] (then on academic suspension --> worked at [employer] --> recently moved to [place]).    At the time of her 12/2022 office visit, she reported a ~ 7 month history of WALKER with moderate levels of exertion and exertional chest pain (\"discomfort\", mid-sternal, more frequent episodes recently). She denied a history of palpitations, syncope, PND, or orthopnea. Outpatient cardiac catheterization was scheduled --> patient suffered an MI prior to scheduled cath date --> inpatient cardiac catheterization demonstrated multivessel CAD.    Multivessel CAD/NSTEMI in 12/2022 s/p CABG x 3 (LIMA-LAD, CryoVein-OM, CryoVein-RCA) and MYNOR exclusion with 35mm AtriClip on 1/3/23.    ++increased stress recently (patient tearful today) --> her son (age 53) experienced a CVA in 3/2024 (still with focal motor deficits and he is currently in a nursing home) and she has been his primary caregiver recently. +MSK chest pain. +recent fatigue and intermittent SOB (in the setting of significant stress). No WALKER the past few days. No new cardiac complaints otherwise. She's been increasing her activity level / she recently purchased a treadmill (although she's not been using it). SR on EKG.    Review of Systems:  Cardiac: As per HPI  General: No fever, chills  Pulmonary: As per HPI  HEENT:

## 2024-07-25 ENCOUNTER — APPOINTMENT (OUTPATIENT)
Dept: CT IMAGING | Age: 79
DRG: 274 | End: 2024-07-25
Payer: MEDICARE

## 2024-07-25 ENCOUNTER — APPOINTMENT (OUTPATIENT)
Dept: GENERAL RADIOLOGY | Age: 79
DRG: 274 | End: 2024-07-25
Payer: MEDICARE

## 2024-07-25 ENCOUNTER — HOSPITAL ENCOUNTER (INPATIENT)
Age: 79
LOS: 8 days | Discharge: HOME | DRG: 274 | End: 2024-08-02
Attending: STUDENT IN AN ORGANIZED HEALTH CARE EDUCATION/TRAINING PROGRAM | Admitting: INTERNAL MEDICINE
Payer: MEDICARE

## 2024-07-25 DIAGNOSIS — R55 SYNCOPE, NEAR: ICD-10-CM

## 2024-07-25 DIAGNOSIS — R55 SYNCOPE, UNSPECIFIED SYNCOPE TYPE: Primary | ICD-10-CM

## 2024-07-25 DIAGNOSIS — I48.0 PAROXYSMAL ATRIAL FIBRILLATION (HCC): ICD-10-CM

## 2024-07-25 DIAGNOSIS — D32.9 BENIGN MENINGIOMA (HCC): ICD-10-CM

## 2024-07-25 DIAGNOSIS — N17.9 AKI (ACUTE KIDNEY INJURY) (HCC): ICD-10-CM

## 2024-07-25 DIAGNOSIS — I25.10 CORONARY ARTERY DISEASE DUE TO LIPID RICH PLAQUE: ICD-10-CM

## 2024-07-25 DIAGNOSIS — I25.83 CORONARY ARTERY DISEASE DUE TO LIPID RICH PLAQUE: ICD-10-CM

## 2024-07-25 LAB
ALBUMIN SERPL-MCNC: 4.2 G/DL (ref 3.5–5.2)
ALP SERPL-CCNC: 74 U/L (ref 35–104)
ALT SERPL-CCNC: 10 U/L (ref 0–32)
ANION GAP SERPL CALCULATED.3IONS-SCNC: 13 MMOL/L (ref 7–16)
AST SERPL-CCNC: 12 U/L (ref 0–31)
BASOPHILS # BLD: 0.04 K/UL (ref 0–0.2)
BASOPHILS NFR BLD: 1 % (ref 0–2)
BILIRUB SERPL-MCNC: 0.8 MG/DL (ref 0–1.2)
BNP SERPL-MCNC: 453 PG/ML (ref 0–450)
BUN SERPL-MCNC: 23 MG/DL (ref 6–23)
CALCIUM SERPL-MCNC: 10.4 MG/DL (ref 8.6–10.2)
CHLORIDE SERPL-SCNC: 103 MMOL/L (ref 98–107)
CO2 SERPL-SCNC: 22 MMOL/L (ref 22–29)
CREAT SERPL-MCNC: 1.1 MG/DL (ref 0.5–1)
EOSINOPHIL # BLD: 0.06 K/UL (ref 0.05–0.5)
EOSINOPHILS RELATIVE PERCENT: 1 % (ref 0–6)
ERYTHROCYTE [DISTWIDTH] IN BLOOD BY AUTOMATED COUNT: 12.9 % (ref 11.5–15)
GFR, ESTIMATED: 54 ML/MIN/1.73M2
GLUCOSE SERPL-MCNC: 146 MG/DL (ref 74–99)
HCT VFR BLD AUTO: 34.4 % (ref 34–48)
HGB BLD-MCNC: 11.5 G/DL (ref 11.5–15.5)
IMM GRANULOCYTES # BLD AUTO: 0.03 K/UL (ref 0–0.58)
IMM GRANULOCYTES NFR BLD: 1 % (ref 0–5)
LYMPHOCYTES NFR BLD: 1.73 K/UL (ref 1.5–4)
LYMPHOCYTES RELATIVE PERCENT: 28 % (ref 20–42)
MCH RBC QN AUTO: 31.1 PG (ref 26–35)
MCHC RBC AUTO-ENTMCNC: 33.4 G/DL (ref 32–34.5)
MCV RBC AUTO: 93 FL (ref 80–99.9)
MONOCYTES NFR BLD: 0.45 K/UL (ref 0.1–0.95)
MONOCYTES NFR BLD: 7 % (ref 2–12)
NEUTROPHILS NFR BLD: 63 % (ref 43–80)
NEUTS SEG NFR BLD: 3.91 K/UL (ref 1.8–7.3)
PLATELET # BLD AUTO: 315 K/UL (ref 130–450)
PMV BLD AUTO: 8.6 FL (ref 7–12)
POTASSIUM SERPL-SCNC: 4.3 MMOL/L (ref 3.5–5)
PROT SERPL-MCNC: 7 G/DL (ref 6.4–8.3)
RBC # BLD AUTO: 3.7 M/UL (ref 3.5–5.5)
SODIUM SERPL-SCNC: 138 MMOL/L (ref 132–146)
TROPONIN I SERPL HS-MCNC: 20 NG/L (ref 0–9)
TROPONIN I SERPL HS-MCNC: 20 NG/L (ref 0–9)
WBC OTHER # BLD: 6.2 K/UL (ref 4.5–11.5)

## 2024-07-25 PROCEDURE — 71045 X-RAY EXAM CHEST 1 VIEW: CPT

## 2024-07-25 PROCEDURE — 80053 COMPREHEN METABOLIC PANEL: CPT

## 2024-07-25 PROCEDURE — 2060000000 HC ICU INTERMEDIATE R&B

## 2024-07-25 PROCEDURE — 6370000000 HC RX 637 (ALT 250 FOR IP): Performed by: NURSE PRACTITIONER

## 2024-07-25 PROCEDURE — 99285 EMERGENCY DEPT VISIT HI MDM: CPT

## 2024-07-25 PROCEDURE — 2580000003 HC RX 258: Performed by: NURSE PRACTITIONER

## 2024-07-25 PROCEDURE — 85025 COMPLETE CBC W/AUTO DIFF WBC: CPT

## 2024-07-25 PROCEDURE — 93005 ELECTROCARDIOGRAM TRACING: CPT

## 2024-07-25 PROCEDURE — 83880 ASSAY OF NATRIURETIC PEPTIDE: CPT

## 2024-07-25 PROCEDURE — 70450 CT HEAD/BRAIN W/O DYE: CPT

## 2024-07-25 PROCEDURE — 84484 ASSAY OF TROPONIN QUANT: CPT

## 2024-07-25 RX ORDER — SODIUM CHLORIDE 0.9 % (FLUSH) 0.9 %
10 SYRINGE (ML) INJECTION PRN
Status: DISCONTINUED | OUTPATIENT
Start: 2024-07-25 | End: 2024-08-02 | Stop reason: HOSPADM

## 2024-07-25 RX ORDER — SODIUM CHLORIDE 0.9 % (FLUSH) 0.9 %
5-40 SYRINGE (ML) INJECTION EVERY 12 HOURS SCHEDULED
Status: DISCONTINUED | OUTPATIENT
Start: 2024-07-25 | End: 2024-08-02 | Stop reason: HOSPADM

## 2024-07-25 RX ORDER — SODIUM CHLORIDE 9 MG/ML
INJECTION, SOLUTION INTRAVENOUS CONTINUOUS
Status: DISCONTINUED | OUTPATIENT
Start: 2024-07-25 | End: 2024-08-02 | Stop reason: HOSPADM

## 2024-07-25 RX ORDER — SIMVASTATIN 40 MG
40 TABLET ORAL NIGHTLY
COMMUNITY

## 2024-07-25 RX ORDER — POLYETHYLENE GLYCOL 3350 17 G/17G
17 POWDER, FOR SOLUTION ORAL DAILY PRN
Status: DISCONTINUED | OUTPATIENT
Start: 2024-07-25 | End: 2024-08-02 | Stop reason: HOSPADM

## 2024-07-25 RX ORDER — SODIUM CHLORIDE 9 MG/ML
INJECTION, SOLUTION INTRAVENOUS PRN
Status: DISCONTINUED | OUTPATIENT
Start: 2024-07-25 | End: 2024-08-02 | Stop reason: HOSPADM

## 2024-07-25 RX ORDER — ACETAMINOPHEN 325 MG/1
650 TABLET ORAL EVERY 6 HOURS PRN
Status: DISCONTINUED | OUTPATIENT
Start: 2024-07-25 | End: 2024-08-02 | Stop reason: HOSPADM

## 2024-07-25 RX ORDER — VALSARTAN 80 MG/1
80 TABLET ORAL DAILY
Status: DISCONTINUED | OUTPATIENT
Start: 2024-07-25 | End: 2024-07-30

## 2024-07-25 RX ORDER — POTASSIUM CHLORIDE 20 MEQ/1
40 TABLET, EXTENDED RELEASE ORAL PRN
Status: DISCONTINUED | OUTPATIENT
Start: 2024-07-25 | End: 2024-08-02 | Stop reason: HOSPADM

## 2024-07-25 RX ORDER — MAGNESIUM SULFATE IN WATER 40 MG/ML
2000 INJECTION, SOLUTION INTRAVENOUS PRN
Status: DISCONTINUED | OUTPATIENT
Start: 2024-07-25 | End: 2024-08-02 | Stop reason: HOSPADM

## 2024-07-25 RX ORDER — POTASSIUM CHLORIDE 7.45 MG/ML
10 INJECTION INTRAVENOUS PRN
Status: DISCONTINUED | OUTPATIENT
Start: 2024-07-25 | End: 2024-08-02 | Stop reason: HOSPADM

## 2024-07-25 RX ORDER — ONDANSETRON 2 MG/ML
4 INJECTION INTRAMUSCULAR; INTRAVENOUS EVERY 6 HOURS PRN
Status: DISCONTINUED | OUTPATIENT
Start: 2024-07-25 | End: 2024-08-02 | Stop reason: HOSPADM

## 2024-07-25 RX ORDER — ATORVASTATIN CALCIUM 20 MG/1
20 TABLET, FILM COATED ORAL DAILY
Status: DISCONTINUED | OUTPATIENT
Start: 2024-07-25 | End: 2024-08-02 | Stop reason: HOSPADM

## 2024-07-25 RX ORDER — ONDANSETRON 4 MG/1
4 TABLET, ORALLY DISINTEGRATING ORAL EVERY 8 HOURS PRN
Status: DISCONTINUED | OUTPATIENT
Start: 2024-07-25 | End: 2024-08-02 | Stop reason: HOSPADM

## 2024-07-25 RX ORDER — METOPROLOL SUCCINATE 25 MG/1
25 TABLET, EXTENDED RELEASE ORAL 2 TIMES DAILY
Status: DISCONTINUED | OUTPATIENT
Start: 2024-07-25 | End: 2024-07-27

## 2024-07-25 RX ORDER — LANOLIN ALCOHOL/MO/W.PET/CERES
3 CREAM (GRAM) TOPICAL NIGHTLY PRN
Status: DISCONTINUED | OUTPATIENT
Start: 2024-07-25 | End: 2024-07-29

## 2024-07-25 RX ORDER — ASPIRIN 81 MG/1
81 TABLET ORAL DAILY
Status: DISCONTINUED | OUTPATIENT
Start: 2024-07-25 | End: 2024-08-02 | Stop reason: HOSPADM

## 2024-07-25 RX ORDER — ACETAMINOPHEN 650 MG/1
650 SUPPOSITORY RECTAL EVERY 6 HOURS PRN
Status: DISCONTINUED | OUTPATIENT
Start: 2024-07-25 | End: 2024-08-02 | Stop reason: HOSPADM

## 2024-07-25 RX ADMIN — ACETAMINOPHEN 650 MG: 325 TABLET ORAL at 23:32

## 2024-07-25 RX ADMIN — METOPROLOL SUCCINATE 25 MG: 25 TABLET, EXTENDED RELEASE ORAL at 21:16

## 2024-07-25 RX ADMIN — SODIUM CHLORIDE: 9 INJECTION, SOLUTION INTRAVENOUS at 23:35

## 2024-07-25 RX ADMIN — SODIUM CHLORIDE, PRESERVATIVE FREE 10 ML: 5 INJECTION INTRAVENOUS at 23:33

## 2024-07-25 RX ADMIN — Medication 3 MG: at 21:16

## 2024-07-25 RX ADMIN — ATORVASTATIN CALCIUM 20 MG: 20 TABLET, FILM COATED ORAL at 21:15

## 2024-07-25 RX ADMIN — APIXABAN 5 MG: 5 TABLET, FILM COATED ORAL at 21:15

## 2024-07-25 RX ADMIN — SODIUM CHLORIDE, PRESERVATIVE FREE 10 ML: 5 INJECTION INTRAVENOUS at 21:16

## 2024-07-25 ASSESSMENT — LIFESTYLE VARIABLES
HOW MANY STANDARD DRINKS CONTAINING ALCOHOL DO YOU HAVE ON A TYPICAL DAY: PATIENT DOES NOT DRINK
HOW OFTEN DO YOU HAVE A DRINK CONTAINING ALCOHOL: NEVER
HOW MANY STANDARD DRINKS CONTAINING ALCOHOL DO YOU HAVE ON A TYPICAL DAY: PATIENT DOES NOT DRINK
HOW OFTEN DO YOU HAVE A DRINK CONTAINING ALCOHOL: NEVER

## 2024-07-25 ASSESSMENT — PAIN SCALES - GENERAL: PAINLEVEL_OUTOF10: 7

## 2024-07-25 ASSESSMENT — PAIN DESCRIPTION - DESCRIPTORS: DESCRIPTORS: ACHING;DISCOMFORT;THROBBING

## 2024-07-25 ASSESSMENT — PAIN DESCRIPTION - LOCATION: LOCATION: HEAD

## 2024-07-25 NOTE — ED NOTES
Ortho static     Laying BP: 152/65   HR 63    Sitting  BP: 158/82   HR 67    Standing  BP:  122/74  HR 72

## 2024-07-25 NOTE — ED PROVIDER NOTES
[facility]  EMERGENCY DEPARTMENT ENCOUNTER      Pt Name: Betsy Monroy  MRN: [id number]  Birthdate 1945  Date of evaluation: 7/25/2024  Provider: Jamaal Hargrove MD  PCP: Nunu Rivera DO  Note Started: 1:18 PM EDT 7/25/24    CHIEF COMPLAINT       Chief Complaint   Patient presents with    Chest Pain     Was at hair salon had LOC, denies hitting her head, vomited and diarrhea, HX MI 1.5 years ago, patient on eliquis       HISTORY OF PRESENT ILLNESS: 1 or more Elements   History From: Patient  Limitations to history : None    Betsy Monroy is a 79 y.o. female who presents BIBEMS with complaints of syncopal episode, onset prior to arrival at her salon.  Endorses loss of consciousness, denies head strike, on Eliquis and compliant.  She also endorses nausea, vomiting and diarrhea that occurred after a syncopal episode. Endorses significant cardiac history, history of sternotomy, CABG x 3.  She endorses nausea actively however denies active chest pain at this time.  Currently denies objective fevers, dizziness, numbness, tingling in extremities, abdominal pain, dysuria..     Nursing Notes were all reviewed and agreed with or any disagreements were addressed in the HPI.    REVIEW OF SYSTEMS :    Positives and Pertinent negatives as per HPI.     PAST MEDICAL HISTORY/Chronic Conditions Affecting Care    has a past medical history of ARJUN (cerebral atherosclerosis), History of blood transfusion, Hyperlipidemia, and Hypertension.     SURGICAL HISTORY     Past Surgical History:   Procedure Laterality Date    CARDIAC CATHETERIZATION  12/30/2022    Dr. Milian- [facility]    COLONOSCOPY      CORONARY ARTERY BYPASS GRAFT N/A 1/3/2023    CABG CORONARY ARTERY BYPASS, BRISEIDA performed by Ezequiel Lawrence MD at [facility] OR    TONSILLECTOMY      UPPER GASTROINTESTINAL ENDOSCOPY N/A 12/27/2022    EGD ESOPHAGOGASTRODUODENOSCOPY performed by Kem Foss MD at [facility] ENDOSCOPY       CURRENTMEDICATIONS       Current Discharge Medication List

## 2024-07-26 LAB
ANION GAP SERPL CALCULATED.3IONS-SCNC: 9 MMOL/L (ref 7–16)
BASOPHILS # BLD: 0.03 K/UL (ref 0–0.2)
BASOPHILS NFR BLD: 0 % (ref 0–2)
BUN SERPL-MCNC: 18 MG/DL (ref 6–23)
CALCIUM SERPL-MCNC: 10.6 MG/DL (ref 8.6–10.2)
CHLORIDE SERPL-SCNC: 109 MMOL/L (ref 98–107)
CO2 SERPL-SCNC: 25 MMOL/L (ref 22–29)
CREAT SERPL-MCNC: 0.9 MG/DL (ref 0.5–1)
EOSINOPHIL # BLD: 0.09 K/UL (ref 0.05–0.5)
EOSINOPHILS RELATIVE PERCENT: 1 % (ref 0–6)
ERYTHROCYTE [DISTWIDTH] IN BLOOD BY AUTOMATED COUNT: 13 % (ref 11.5–15)
GFR, ESTIMATED: 66 ML/MIN/1.73M2
GLUCOSE SERPL-MCNC: 113 MG/DL (ref 74–99)
HCT VFR BLD AUTO: 33.5 % (ref 34–48)
HGB BLD-MCNC: 10.7 G/DL (ref 11.5–15.5)
IMM GRANULOCYTES # BLD AUTO: 0.04 K/UL (ref 0–0.58)
IMM GRANULOCYTES NFR BLD: 1 % (ref 0–5)
LYMPHOCYTES NFR BLD: 1.38 K/UL (ref 1.5–4)
LYMPHOCYTES RELATIVE PERCENT: 21 % (ref 20–42)
MCH RBC QN AUTO: 30.3 PG (ref 26–35)
MCHC RBC AUTO-ENTMCNC: 31.9 G/DL (ref 32–34.5)
MCV RBC AUTO: 94.9 FL (ref 80–99.9)
MONOCYTES NFR BLD: 0.55 K/UL (ref 0.1–0.95)
MONOCYTES NFR BLD: 8 % (ref 2–12)
NEUTROPHILS NFR BLD: 69 % (ref 43–80)
NEUTS SEG NFR BLD: 4.59 K/UL (ref 1.8–7.3)
PLATELET # BLD AUTO: 305 K/UL (ref 130–450)
PMV BLD AUTO: 8.6 FL (ref 7–12)
POTASSIUM SERPL-SCNC: 5 MMOL/L (ref 3.5–5)
RBC # BLD AUTO: 3.53 M/UL (ref 3.5–5.5)
SODIUM SERPL-SCNC: 143 MMOL/L (ref 132–146)
WBC OTHER # BLD: 6.7 K/UL (ref 4.5–11.5)

## 2024-07-26 PROCEDURE — 80048 BASIC METABOLIC PNL TOTAL CA: CPT

## 2024-07-26 PROCEDURE — 97530 THERAPEUTIC ACTIVITIES: CPT

## 2024-07-26 PROCEDURE — 85025 COMPLETE CBC W/AUTO DIFF WBC: CPT

## 2024-07-26 PROCEDURE — 2060000000 HC ICU INTERMEDIATE R&B

## 2024-07-26 PROCEDURE — 36415 COLL VENOUS BLD VENIPUNCTURE: CPT

## 2024-07-26 PROCEDURE — 2580000003 HC RX 258: Performed by: NURSE PRACTITIONER

## 2024-07-26 PROCEDURE — 6370000000 HC RX 637 (ALT 250 FOR IP): Performed by: NURSE PRACTITIONER

## 2024-07-26 PROCEDURE — 97161 PT EVAL LOW COMPLEX 20 MIN: CPT

## 2024-07-26 RX ADMIN — ACETAMINOPHEN 650 MG: 325 TABLET ORAL at 23:51

## 2024-07-26 RX ADMIN — ACETAMINOPHEN 650 MG: 325 TABLET ORAL at 06:41

## 2024-07-26 RX ADMIN — VALSARTAN 80 MG: 80 TABLET, FILM COATED ORAL at 00:16

## 2024-07-26 RX ADMIN — ASPIRIN 81 MG: 81 TABLET, COATED ORAL at 08:32

## 2024-07-26 RX ADMIN — ATORVASTATIN CALCIUM 20 MG: 20 TABLET, FILM COATED ORAL at 08:32

## 2024-07-26 RX ADMIN — METOPROLOL SUCCINATE 25 MG: 25 TABLET, EXTENDED RELEASE ORAL at 20:07

## 2024-07-26 RX ADMIN — APIXABAN 5 MG: 5 TABLET, FILM COATED ORAL at 20:07

## 2024-07-26 RX ADMIN — SODIUM CHLORIDE, PRESERVATIVE FREE 10 ML: 5 INJECTION INTRAVENOUS at 08:32

## 2024-07-26 RX ADMIN — METOPROLOL SUCCINATE 25 MG: 25 TABLET, EXTENDED RELEASE ORAL at 08:32

## 2024-07-26 RX ADMIN — VALSARTAN 80 MG: 80 TABLET, FILM COATED ORAL at 08:32

## 2024-07-26 RX ADMIN — APIXABAN 5 MG: 5 TABLET, FILM COATED ORAL at 08:32

## 2024-07-26 ASSESSMENT — PAIN SCALES - GENERAL
PAINLEVEL_OUTOF10: 0
PAINLEVEL_OUTOF10: 8
PAINLEVEL_OUTOF10: 0

## 2024-07-26 ASSESSMENT — PAIN DESCRIPTION - LOCATION
LOCATION: HEAD
LOCATION: HEAD

## 2024-07-26 ASSESSMENT — PAIN DESCRIPTION - DESCRIPTORS: DESCRIPTORS: ACHING;DISCOMFORT;THROBBING

## 2024-07-26 NOTE — CARE COORDINATION
Transition of Care: Met with patient at bedside and explained case management role. Patient lives alone in a 2 story home. Patient independent prior to admission.She has no hx with HHC or SNF. PCP is Dr. Rivera and pharmacy is [facility] in [place]. Family will transport home, anticipate no needs. Patient came to hospital after blacking out at the [facility]. She had emesis and diarrhea with LOC. CM/SW to follow. MT    Case Management Assessment  Initial Evaluation    Date/Time of Evaluation: 7/26/2024 3:21 PM  Assessment Completed by: Jonnathan Lucero RN    If patient is discharged prior to next notation, then this note serves as note for discharge by case management.    Patient Name: Betsy Monroy                   YOB: 1945  Diagnosis: DIANNA (acute kidney injury) (HCC) [N17.9]  Syncope, near [R55]  Syncope, unspecified syncope type [R55]                   Date / Time: 7/25/2024  1:02 PM    Patient Admission Status: Inpatient   Readmission Risk (Low < 19, Mod (19-27), High > 27): Readmission Risk Score: 10.3    Current PCP: Nunu Rivera, DO  PCP verified by CM? Yes    Chart Reviewed: Yes      History Provided by: Patient  Patient Orientation: Alert and Oriented    Patient Cognition: Alert    Hospitalization in the last 30 days (Readmission):  No    If yes, Readmission Assessment in  Navigator will be completed.    Advance Directives:      Code Status: Full Code   Patient's Primary Decision Maker is: Legal Next of Kin      Discharge Planning:    Patient lives with: Alone Type of Home: House  Primary Care Giver: Self  Patient Support Systems include: Family Members   Current Financial resources:    Current community resources:    Current services prior to admission: None            Current DME:              Type of Home Care services:  None    ADLS  Prior functional level: Independent in ADLs/IADLs  Current functional level: Independent in ADLs/IADLs    PT AM-PAC: 18 /24  OT AM-PAC:

## 2024-07-26 NOTE — FLOWSHEET NOTE
07/26/24 1105   Vitals   Temp 98.2 °F (36.8 °C)   Temp Source Temporal   Heart Rate Source Monitor   Respirations 18   BP Location Right upper arm   BP Upper/Lower Upper   BP Method Automatic   Orthostatic B/P and Pulse? Yes   Blood Pressure Lying 155/66   Pulse Lying 63 PER MINUTE   Blood Pressure Sitting 137/66   Pulse Sitting 65 PER MINUTE   Blood Pressure Standing 103/67   Pulse Standing 72 PER MINUTE   Oxygen Therapy   SpO2 97 %   O2 Device None (Room air)

## 2024-07-26 NOTE — PLAN OF CARE
Problem: Discharge Planning  Goal: Discharge to home or other facility with appropriate resources  Outcome: Progressing  Flowsheets (Taken 7/25/2024 4384)  Discharge to home or other facility with appropriate resources: Identify barriers to discharge with patient and caregiver     Problem: Safety - Adult  Goal: Free from fall injury  Outcome: Progressing

## 2024-07-26 NOTE — PROGRESS NOTES
4 Eyes Skin Assessment     NAME:  Betsy Monroy  YOB: 1945  MEDICAL RECORD NUMBER:  [id number]    The patient is being assessed for  Admission    I agree that at least one RN has performed a thorough Head to Toe Skin Assessment on the patient. ALL assessment sites listed below have been assessed.      Areas assessed by both nurses:    Head, Face, Ears, Shoulders, Back, Chest, Arms, Elbows, Hands, Sacrum. Buttock, Coccyx, Ischium, Legs. Feet and Heels, and Under Medical Devices         Does the Patient have a Wound? No noted wound(s)       Erlin Prevention initiated by RN: No  Wound Care Orders initiated by RN: No    Pressure Injury (Stage 3,4, Unstageable, DTI, NWPT, and Complex wounds) if present, place Wound referral order by RN under : No    New Ostomies, if present place, Ostomy referral order under : No     Nurse 1 eSignature: Electronically signed by Rosario Shaw RN on 7/25/24 at 10:46 PM EDT    **SHARE this note so that the co-signing nurse can place an eSignature**    Nurse 2 eSignature: Electronically signed by Sofi Mejia RN on 7/25/24 at 10:47 PM EDT

## 2024-07-26 NOTE — H&P
[facility]  History and Physical      CHIEF COMPLAINT:    Chief Complaint   Patient presents with    Chest Pain     Was at [employer] had LOC, denies hitting her head, vomited and diarrhea, HX MI 1.5 years ago, patient on eliquis        Patient of Nunu Rivera DO presents with:  Syncope, near    History of Present Illness:   Patient is a 79-year-old female with a past medical history of HLD, HTN, CABG x 3 (LIMA-LAD, CryoVein-OM, CryoVein-RCA)/MYNOR exclusion with 35mm AtriClip, atrial fibrillation on Eliquis who presents to the emergency room for near syncope while at the hair salon.  Patient had a near syncopal episode while getting her hair done prompting her to come to the emergency room for further evaluation.  She indicates she had multiple nausea and vomiting episodes thereafter and lost control of her bowels.  Notably she was recently, 2 days ago initiated on Pristiq-she does have a known history of intolerance to many medications.  She also felt queasy the day before when she took Pristiq.    A chest x-ray was obtained which was negative for any acute findings.  A CT head revealed no acute intercranial abnormality however 1.2 cm x 1.2 cm benign meningioma was identified.  Labs on arrival revealed a mildly elevated creatinine of 1.1, mild hypercalcemia of 10.4, proBNP of 453, troponin 20-20.  Patient is admitted to intermediate telemetry and for further workup and treatment.  On evaluation she is comfortable in no apparent acute distress-she tells me she recently had a Zio patch placed and on follow-up with cardiology she was told she has some findings but is supposed to follow-up with cardiology again in the next 6 weeks.  She indicates she feels back to her normal baseline now has no chest pain or shortness of breath which she never did prior to her syncope/presyncopal episode    REVIEW OF SYSTEMS:  Pertinent negatives are above in HPI.  10 point ROS otherwise negative.      Past Medical  syncope  -Sounds to be more of a vasovagal episode associated with nausea vomiting and loss of bowel function-no associated chest pain or shortness of breath  -Notably recently initiated on Pristiq which she feels did not sit well with her as she had queasiness and nausea the day before when she took it as well  -Monitor labs  -23/1.1 18/0.9>   -Continue IVF NS at 75  -Check orthostatic Bps  -Troponin 20-20  -Echo 1/6/23 > EF 45 to 50%, mild MR with moderate TR  -Continue to monitor vital signs  -Given her strong cardiac history and currently wearing a Holter monitor at home-cardiology consultation to be obtained to ensure no cardiac related syncope  -If no further plans by cardiology-she may be discharged home    Atrial fibrillation  -Continue home metoprolol XL 25 mg twice daily for rate control  -Continue Eliquis    Hypertension  -Continue home medications with parameters  -Continue to monitor Bps      Medication for other comorbidities continue as appropriate dose adjustment as necessary.    DVT prophylaxis Eliquis  PT OT  Discharge planning      Code status: full  Requires inpatient level of care      I have spent a total time of 70 minutes of this patient encounter reviewing chart, labs, coordinating care with interdisciplinary teams, face to face encounter with patient, providing counseling/education to patient/family.      Roseline Bates MD  7/26/2024

## 2024-07-26 NOTE — PROGRESS NOTES
Physical Therapy  Physical Therapy Initial Assessment     Name: Betsy Monroy  : 1945  MRN: [id number]      Date of Service: 2024    Evaluating PT:  Neri Bone PT, DPT    Room #:  4509/4509-B  Diagnosis:  DIANNA (acute kidney injury) (HCC) [N17.9]  Syncope, near [R55]  Syncope, unspecified syncope type [R55]  PMHx/PSHx:  HLD, HTN, CABG x3, afib  Procedure/Surgery:  N/A  Precautions:  fall risk  Equipment Needs:  TBD    SUBJECTIVE:    Pt lives alone in a 2 story home with 2 steps to enter and a grab bar.  Bed/bath is on 2nd floor.  Pt ambulated with no AD PTA.    OBJECTIVE:   Initial Evaluation  Date: 24 Treatment Short Term/ Long Term   Goals   AM-PAC 6 Clicks      Was pt agreeable to Eval/treatment? yes     Does pt have pain? No pain     Bed Mobility  Rolling: IND  Supine to sit: IND  Sit to supine: IND  Scooting: IND     Transfers Sit to stand: SBA  Stand to sit: SBA  Stand pivot: SBA with no AD  Sit to stand: IND  Stand to sit: IND  Stand pivot: IND with no AD   Ambulation    200' with no AD SBA  400'+ with no AD IND   Stair negotiation: ascended and descended  NT  10 steps with 1 handrail mod I     Strength/ROM:   BLE grossly 5/5  BLE AROM WFL    Balance:   Static Sitting: IND  Dynamic Sitting: IND  Static Standing: Supervision with no AD  Dynamic Standing: SBA with no AD    Pt is A & O x 3  Sensation:  Pt denies numbness and tingling to extremities  Edema:  unremarkable    Vitals:  SpO2 and HR were stable during session  BP in supine: 136/68, HR 61 bpm  BP in sittin/74, HR 67 bpm  BP in standin/63, HR 77 bpm    Therapeutic Exercises:    Bed mobility: supine<>sit, cued for EOB positioning  Transfers: STS x1, cued for hand placement and postural correction  Ambulation: 200' with no AD  BLE AROM    Patient education  Pt educated on role of PT, importance of functional mobility during hospital stay, safety with functional mobility    Patient response to education:   Pt verbalized  understanding Pt demonstrated skill Pt requires further education in this area   yes yes reinforce     ASSESSMENT:    Conditions Requiring Skilled Therapeutic Intervention:    [x]Decreased strength     []Decreased ROM  [x]Decreased functional mobility  [x]Decreased balance   [x]Decreased endurance   []Decreased posture  []Decreased sensation  []Decreased coordination   []Decreased vision  []Decreased safety awareness   []Increased pain       Comments:  Pt supine in bed upon entering, pt agreeable to participate. Pt instructed to transfer to EOB, completing transfer with no physical assistance. Pt sitting upright with good sitting balance. Pt with no c/o dizziness with position change. Pt then cued for hand placement and instructed to stand from EOB. Pt standing with good balance with no AD. Pt instructed to ambulate to tolerance. Pt ambulating with good deep and balance, cueing provided for safe hallway negotiation. Pt demonstrated good tolerance to ambulation bout. Pt was assisted back to bedside and was transferred back to bed, per pt request. Pt positioned for comfort with all needs met and call bell in reach prior to exiting.    Treatment:  Patient practiced and was instructed in the following treatment:    STS and pivot transfer training - pt educated on proper hand and foot placement, safety and sequencing, and use of verbal and tactile cues to safely complete sit<>stand and pivot transfers with stand by assistance to complete task safely   Gait training- pt was given verbal and tactile cues to facilitate pt safety during ambulation as well as provided with stand by assistance.    Pt's/ family goals   1. Return home    Prognosis is good for reaching above PT goals.    Patient and or family understand(s) diagnosis, prognosis, and plan of care.  yes    PHYSICAL THERAPY PLAN OF CARE:    PT POC is established based on physician order and patient diagnosis     Referring provider/PT Order:  Sarah Dowd APRN

## 2024-07-27 ENCOUNTER — APPOINTMENT (OUTPATIENT)
Age: 79
DRG: 274 | End: 2024-07-27
Attending: INTERNAL MEDICINE
Payer: MEDICARE

## 2024-07-27 LAB
ALBUMIN SERPL-MCNC: 3.9 G/DL (ref 3.5–5.2)
ALP SERPL-CCNC: 70 U/L (ref 35–104)
ALT SERPL-CCNC: 8 U/L (ref 0–32)
ANION GAP SERPL CALCULATED.3IONS-SCNC: 10 MMOL/L (ref 7–16)
AST SERPL-CCNC: 10 U/L (ref 0–31)
BASOPHILS # BLD: 0.04 K/UL (ref 0–0.2)
BASOPHILS NFR BLD: 1 % (ref 0–2)
BILIRUB SERPL-MCNC: 0.8 MG/DL (ref 0–1.2)
BUN SERPL-MCNC: 14 MG/DL (ref 6–23)
CALCIUM SERPL-MCNC: 10.3 MG/DL (ref 8.6–10.2)
CHLORIDE SERPL-SCNC: 108 MMOL/L (ref 98–107)
CHOLEST SERPL-MCNC: 191 MG/DL
CO2 SERPL-SCNC: 22 MMOL/L (ref 22–29)
CREAT SERPL-MCNC: 0.8 MG/DL (ref 0.5–1)
EOSINOPHIL # BLD: 0.13 K/UL (ref 0.05–0.5)
EOSINOPHILS RELATIVE PERCENT: 2 % (ref 0–6)
ERYTHROCYTE [DISTWIDTH] IN BLOOD BY AUTOMATED COUNT: 12.8 % (ref 11.5–15)
GFR, ESTIMATED: 77 ML/MIN/1.73M2
GLUCOSE SERPL-MCNC: 103 MG/DL (ref 74–99)
HBA1C MFR BLD: 6.1 % (ref 4–5.6)
HCT VFR BLD AUTO: 33.8 % (ref 34–48)
HDLC SERPL-MCNC: 71 MG/DL
HGB BLD-MCNC: 11.1 G/DL (ref 11.5–15.5)
IMM GRANULOCYTES # BLD AUTO: 0.03 K/UL (ref 0–0.58)
IMM GRANULOCYTES NFR BLD: 1 % (ref 0–5)
LDLC SERPL CALC-MCNC: 99 MG/DL
LYMPHOCYTES NFR BLD: 1.97 K/UL (ref 1.5–4)
LYMPHOCYTES RELATIVE PERCENT: 31 % (ref 20–42)
MAGNESIUM SERPL-MCNC: 2.1 MG/DL (ref 1.6–2.6)
MCH RBC QN AUTO: 31 PG (ref 26–35)
MCHC RBC AUTO-ENTMCNC: 32.8 G/DL (ref 32–34.5)
MCV RBC AUTO: 94.4 FL (ref 80–99.9)
MONOCYTES NFR BLD: 0.52 K/UL (ref 0.1–0.95)
MONOCYTES NFR BLD: 8 % (ref 2–12)
NEUTROPHILS NFR BLD: 57 % (ref 43–80)
NEUTS SEG NFR BLD: 3.63 K/UL (ref 1.8–7.3)
PLATELET # BLD AUTO: 320 K/UL (ref 130–450)
PMV BLD AUTO: 8.8 FL (ref 7–12)
POTASSIUM SERPL-SCNC: 4.4 MMOL/L (ref 3.5–5)
PROT SERPL-MCNC: 6.4 G/DL (ref 6.4–8.3)
RBC # BLD AUTO: 3.58 M/UL (ref 3.5–5.5)
SODIUM SERPL-SCNC: 140 MMOL/L (ref 132–146)
T4 FREE SERPL-MCNC: 1.3 NG/DL (ref 0.9–1.7)
TRIGL SERPL-MCNC: 105 MG/DL
TSH SERPL DL<=0.05 MIU/L-ACNC: 2.61 UIU/ML (ref 0.27–4.2)
VLDLC SERPL CALC-MCNC: 21 MG/DL
WBC OTHER # BLD: 6.3 K/UL (ref 4.5–11.5)

## 2024-07-27 PROCEDURE — 84443 ASSAY THYROID STIM HORMONE: CPT

## 2024-07-27 PROCEDURE — 83735 ASSAY OF MAGNESIUM: CPT

## 2024-07-27 PROCEDURE — 2580000003 HC RX 258: Performed by: NURSE PRACTITIONER

## 2024-07-27 PROCEDURE — 84439 ASSAY OF FREE THYROXINE: CPT

## 2024-07-27 PROCEDURE — 6370000000 HC RX 637 (ALT 250 FOR IP): Performed by: INTERNAL MEDICINE

## 2024-07-27 PROCEDURE — 36415 COLL VENOUS BLD VENIPUNCTURE: CPT

## 2024-07-27 PROCEDURE — 6370000000 HC RX 637 (ALT 250 FOR IP): Performed by: NURSE PRACTITIONER

## 2024-07-27 PROCEDURE — 99223 1ST HOSP IP/OBS HIGH 75: CPT | Performed by: INTERNAL MEDICINE

## 2024-07-27 PROCEDURE — 83036 HEMOGLOBIN GLYCOSYLATED A1C: CPT

## 2024-07-27 PROCEDURE — 2060000000 HC ICU INTERMEDIATE R&B

## 2024-07-27 PROCEDURE — 80061 LIPID PANEL: CPT

## 2024-07-27 PROCEDURE — 80053 COMPREHEN METABOLIC PANEL: CPT

## 2024-07-27 PROCEDURE — 85025 COMPLETE CBC W/AUTO DIFF WBC: CPT

## 2024-07-27 RX ORDER — METOPROLOL SUCCINATE 50 MG/1
50 TABLET, EXTENDED RELEASE ORAL 2 TIMES DAILY
Status: DISCONTINUED | OUTPATIENT
Start: 2024-07-27 | End: 2024-08-02 | Stop reason: HOSPADM

## 2024-07-27 RX ADMIN — METOPROLOL SUCCINATE 25 MG: 25 TABLET, EXTENDED RELEASE ORAL at 08:28

## 2024-07-27 RX ADMIN — APIXABAN 5 MG: 5 TABLET, FILM COATED ORAL at 08:28

## 2024-07-27 RX ADMIN — ATORVASTATIN CALCIUM 20 MG: 20 TABLET, FILM COATED ORAL at 08:28

## 2024-07-27 RX ADMIN — METOPROLOL SUCCINATE 50 MG: 50 TABLET, EXTENDED RELEASE ORAL at 21:16

## 2024-07-27 RX ADMIN — ASPIRIN 81 MG: 81 TABLET, COATED ORAL at 08:28

## 2024-07-27 RX ADMIN — SODIUM CHLORIDE, PRESERVATIVE FREE 10 ML: 5 INJECTION INTRAVENOUS at 21:16

## 2024-07-27 RX ADMIN — ACETAMINOPHEN 650 MG: 325 TABLET ORAL at 09:46

## 2024-07-27 RX ADMIN — APIXABAN 5 MG: 5 TABLET, FILM COATED ORAL at 21:16

## 2024-07-27 RX ADMIN — VALSARTAN 80 MG: 80 TABLET, FILM COATED ORAL at 08:28

## 2024-07-27 RX ADMIN — SODIUM CHLORIDE, PRESERVATIVE FREE 10 ML: 5 INJECTION INTRAVENOUS at 08:31

## 2024-07-27 ASSESSMENT — PAIN DESCRIPTION - LOCATION: LOCATION: HEAD

## 2024-07-27 ASSESSMENT — PAIN DESCRIPTION - DESCRIPTORS: DESCRIPTORS: ACHING

## 2024-07-27 ASSESSMENT — PAIN SCALES - GENERAL
PAINLEVEL_OUTOF10: 0
PAINLEVEL_OUTOF10: 5
PAINLEVEL_OUTOF10: 0

## 2024-07-27 NOTE — DISCHARGE SUMMARY
[facility]   Discharge summary   Patient ID:  Betsy Monroy  [id number]  79 y.o.  1945    Admit date: 7/25/2024    Discharge date and time: 7/27/2024    Admission Diagnoses:   Patient Active Problem List   Diagnosis    Bilateral carotid artery stenosis    NSTEMI (non-ST elevated myocardial infarction) (HCC)    Acute heart failure (HCC)    Systolic murmur    EKG, abnormal    Abnormal nuclear stress test    Primary hypertension    Hyperlipidemia    Carotid artery disease (HCC)    CAD (coronary artery disease)    Acute pulmonary insufficiency    Acute postoperative pain    Postoperative atrial fibrillation (HCC)    Syncope, near       Discharge Diagnoses: Near syncope    Consults: cardiology    Procedures:     Hospital Course: The patient is a 79 y.o. female of Nunu Rivera DO     Patient is a 79-year-old female with a hx of Afib on eliquis, HTN and CAD s/p CABG x 3 (LIMA-LAD, CryoVein-OM, CryoVein-RCA)/MYNOR exclusion with 35mm AtriClip on 1/2/23 who is admitted to [facility] for  Near syncope-questionable syncope  -Sounds to be more of a vasovagal episode associated with nausea vomiting and loss of bowel function-no associated chest pain or shortness of breath  -Notably recently initiated on Pristiq which she feels did not sit well with her as she had queasiness and nausea the day before when she took it as well  -Monitor labs  -23/1.1 18/0.9>   -Continue IVF NS at 75  -Check orthostatic Bps  -Troponin 20-20  -Echo 1/6/23 > EF 45 to 50%, mild MR with moderate TR  -Continue to monitor vital signs  -Given her strong cardiac history and currently wearing a Holter monitor at home-cardiology consultation to be obtained to ensure no cardiac related syncope  -If no further plans by cardiology-she may be discharged home     Atrial fibrillation  -Continue home metoprolol XL 25 mg twice daily for rate control  -Continue Eliquis     Hypertension  -Continue home medications with parameters  -Continue to  abnormal extra-axial fluid collection.  The gray-white differentiation is maintained without evidence of an acute infarct.  There is no evidence of hydrocephalus. The ventricles, cisterns and sulci are prominent consistent with atrophy.  There is decreased attenuation within the periventricular white matter consistent with periventricular microvascular ischemic disease Within the posterior fossa on the left abutting the tentorium note is made of a benign extra-axial 1.2 cm x 1.2 cm soft tissue focus consistent with a benign extra-axial meningioma. ORBITS: The visualized portion of the orbits demonstrate no acute abnormality. SINUSES: The visualized paranasal sinuses and mastoid air cells demonstrate no acute abnormality. SOFT TISSUES/SKULL:  No acute abnormality of the visualized skull or soft tissues.     1. There is no acute intracranial abnormality.  Specifically, there is no intracranial hemorrhage. 2. Atrophy and periventricular microvascular ischemic disease 3. There is a 1.2 cm x 1.2 cm benign meningioma seen within the posterior fossa on the left.,       Discharge Exam:    HEENT: NCAT,  PERRLA, No JVD  Heart:  RRR, no murmurs, gallops, or rubs.  Lungs:  CTA bilaterally, no wheeze, rales or rhonchi  Abd: bowel sounds present, nontender, nondistended, no masses  Extrem:  No clubbing, cyanosis, or edema    Disposition: home     Patient Condition at Discharge: stable    Patient Instructions:      Medication List        CONTINUE taking these medications      aspirin 81 MG EC tablet  Take 1 tablet by mouth daily     cilostazol 100 MG tablet  Commonly known as: PLETAL     Eliquis 5 MG Tabs tablet  Generic drug: apixaban  TAKE 1 TABLET TWICE A DAY     metoprolol succinate 25 MG extended release tablet  Commonly known as: TOPROL XL  Take 1 tablet by mouth in the morning and at bedtime     simvastatin 40 MG tablet  Commonly known as: ZOCOR     valsartan 80 MG tablet  Commonly known as: DIOVAN  TAKE 1 TABLET DAILY

## 2024-07-27 NOTE — DISCHARGE INSTRUCTIONS
Orthostatic Hypotension: Care Instructions  Overview     Orthostatic hypotension is a quick drop in blood pressure. It happens when you get up from sitting or lying down. You may feel faint, lightheaded, or dizzy.  When a person sits up or stands up, the body changes the way it pumps blood. This can slow the flow of blood to the brain for a very short time. And that can make you feel lightheaded.  Many medicines can cause this problem, especially in older people. Lack of fluids (dehydration) or illnesses such as diabetes or heart disease also can cause it.  Follow-up care is a key part of your treatment and safety. Be sure to make and go to all appointments, and call your doctor if you are having problems. It's also a good idea to know your test results and keep a list of the medicines you take.  How can you care for yourself at home?  Be safe with medicines. Call your doctor if you think you are having a problem with your medicine. You will get more details on the specific medicines your doctor prescribes.  If you feel dizzy or lightheaded, sit down or lie down for a few minutes. Or you can sit down and put your head between your knees. This will help your blood pressure go back to normal and help your symptoms go away.  Follow your doctor's suggestions for ways to prevent symptoms like dizziness. These suggestions may include:  Get up slowly from bed or after sitting for a long time. If you are in bed, roll to your side and swing your legs over the edge of the bed and onto the floor. Push your body up to a sitting position. Wait for a while before you slowly stand up.  Add more salt to your diet, if your doctor recommends it.  Drink plenty of fluids. Choose water and other clear liquids. If you have kidney, heart, or liver disease and have to limit fluids, talk with your doctor before you increase the amount of fluids you drink.  Avoid or limit alcohol to 2 drinks a day for men and 1 drink a day for women.

## 2024-07-27 NOTE — CONSULTS
Betsy Monroy  1945  Date of Service: 7/27/2024    Reason for Consultation:    We were asked to see Betsy Monroy by Nunu Palacios DO  regarding syncope.    History of Chief Complaint:   This is a 79 y.o. female known to our group through Dr. Vazquez.  They have a history of coronary artery disease, ischemic cardiomyopathy, and paroxysmal atrial fibrillation.  She states that she had a syncopal event in May or June.  She states she was sitting at the kitchen table and she just woke up on the floor.  She does not have a monitor performed in April which demonstrated 2 runs of nonsustained ventricular tachycardia up to 9 beats.  She states that she recently started Pristiq and she has noticed that she felt \"off\".  She feels that this equates to some lightheadedness.  She states that yesterday she was having her hair done and continue to feel this way.  She then got up to leave and suddenly found herself waking up on the floor again.  She states that she defecated her pants and had nausea and vomiting with this when she woke up.  She denies any chest discomfort, dyspnea on exertion, orthopnea/PND.  She denies any palpitations.    REVIEW OF SYSTEMS:   Heart: as above   Lungs: as above   Eyes: denies changes in vision or discharge.    Ears: denies changes in hearing or pain.   Nose: denies epistaxis or masses   Throat: denies sore throat or trouble swallowing.    Neuro: denies numbness, tingling, tremors.   Skin: denies rashes or itching.   : denies hematuria, dysuria   GI: denies vomiting, diarrhea   Psych: denies mood changed, anxiety, depression.    CURRENT MEDICATIONS:  Current Facility-Administered Medications   Medication Dose Route Frequency Provider Last Rate Last Admin    perflutren lipid microspheres (DEFINITY) injection 1.5 mL  1.5 mL IntraVENous ONCE PRN Dallas Gonzalez, DO        aspirin EC tablet 81 mg  81 mg Oral Daily Sarah Dowd APRN - CNP   81 mg at 07/27/24 0828    apixaban  as well before she stood up.  The second 1 caused defecation in her pants and nausea and vomiting.  Recent ZIO monitor demonstrating runs of ventricular tachycardia  Coronary artery disease  Ischemic cardiomyopathy.  EF 40%.  Improved to 45 to 50% on her last echo 1-23.  Paroxysmal atrial fibrillation      Recommendations:  Echo  Consider increasing her beta-blocker  Consult electrophysiology  Lexiscan Cardiolite stress test    Thank you for allowing me to participate in your patient's care.      Dallas Gonzalez DO, [facility], [facility]  Interventional Cardiology    Note: This report was completed using computerized voice recognition software. Every effort has been made to ensure accuracy, however; and invert and computerized transcription errors may be present.

## 2024-07-27 NOTE — PROGRESS NOTES
[facility]   Progress note      Subjective:    The patient is awake and alert.    Feeling better today  Was seen by cardiology today    Objective:    BP (!) 161/56   Pulse 59   Temp 97.2 °F (36.2 °C) (Temporal)   Resp 20   Ht 1.6 m (5' 3\")   Wt 65.3 kg (144 lb)   SpO2 98%   BMI 25.51 kg/m²     In: 1648.7 [P.O.:480; I.V.:1168.7]  Out: 0   In: 1648.7   Out: 0     General appearance: NAD, conversant  HEENT: AT/NC, MMM  Neck: FROM, supple  Lungs: Clear to auscultation  CV: RRR, no MRGs  Vasc: Radial pulses 2+  Abdomen: Soft, non-tender; no masses or HSM  Extremities: No peripheral edema or digital cyanosis  Skin: no rash, lesions or ulcers  Psych: Alert and oriented to person, place and time  Neuro: Alert and interactive     Recent Labs     07/25/24  1315 07/26/24  0508 07/27/24  0549   WBC 6.2 6.7 6.3   HGB 11.5 10.7* 11.1*   HCT 34.4 33.5* 33.8*    305 320       Recent Labs     07/25/24  1315 07/26/24  0508 07/27/24  0549    143 140   K 4.3 5.0 4.4    109* 108*   CO2 22 25 22   BUN 23 18 14   CREATININE 1.1* 0.9 0.8   CALCIUM 10.4* 10.6* 10.3*       Assessment:    Principal Problem:    Syncope, near  Resolved Problems:    * No resolved hospital problems. *      Plan:    Patient is a 79-year-old female with a hx of Afib on eliquis, HTN and CAD s/p CABG x 3 (LIMA-LAD, CryoVein-OM, CryoVein-RCA)/MYNOR exclusion with 35mm AtriClip on 1/2/23 who is admitted to [facility] for  Near syncope-questionable syncope  -Sounds to be more of a vasovagal episode associated with nausea vomiting and loss of bowel function-no associated chest pain or shortness of breath  -Notably recently initiated on Pristiq which she feels did not sit well with her as she had queasiness and nausea the day before when she took it as well  -Monitor labs  -23/1.1 18/0.9>   -Continue IVF NS at 75  -Check orthostatic Bps  -Troponin 20-20  -Echo 1/6/23 > EF 45 to 50%, mild MR with moderate TR  -Continue to

## 2024-07-27 NOTE — CARE COORDINATION
Discharge order noted.  Patient walked 200 ft SBA no AD with therapy.  Per previous Cm note, patient's family can provide transportation home.  Cardiology to see and clear for discharge.  AVS updated.  No other needs anticipated for discharge at this time.  Will continue to follow for further transition of care planning needs.       Emely Dickey RN.  P:  [phone]

## 2024-07-27 NOTE — PLAN OF CARE
Problem: Discharge Planning  Goal: Discharge to home or other facility with appropriate resources  7/27/2024 1610 by Mira Chaparro RN  Outcome: Progressing  Flowsheets (Taken 7/27/2024 0823)  Discharge to home or other facility with appropriate resources: Identify barriers to discharge with patient and caregiver  7/27/2024 0552 by Rosario Roberts RN  Outcome: Progressing     Problem: ABCDS Injury Assessment  Goal: Absence of physical injury  Outcome: Progressing     Problem: Safety - Adult  Goal: Free from fall injury  7/27/2024 1610 by Mira Chaparro RN  Outcome: Progressing  7/27/2024 0552 by Rosario Roberts RN  Outcome: Progressing     Problem: Pain  Goal: Verbalizes/displays adequate comfort level or baseline comfort level  Outcome: Progressing

## 2024-07-28 ENCOUNTER — APPOINTMENT (OUTPATIENT)
Dept: NUCLEAR MEDICINE | Age: 79
DRG: 274 | End: 2024-07-28
Attending: INTERNAL MEDICINE
Payer: MEDICARE

## 2024-07-28 ENCOUNTER — APPOINTMENT (OUTPATIENT)
Age: 79
DRG: 274 | End: 2024-07-28
Attending: INTERNAL MEDICINE
Payer: MEDICARE

## 2024-07-28 PROBLEM — I95.1 ORTHOSTATIC HYPOTENSION: Status: ACTIVE | Noted: 2024-07-28

## 2024-07-28 LAB
ANION GAP SERPL CALCULATED.3IONS-SCNC: 8 MMOL/L (ref 7–16)
BASOPHILS # BLD: 0.03 K/UL (ref 0–0.2)
BASOPHILS NFR BLD: 1 % (ref 0–2)
BUN SERPL-MCNC: 16 MG/DL (ref 6–23)
CALCIUM SERPL-MCNC: 10.5 MG/DL (ref 8.6–10.2)
CHLORIDE SERPL-SCNC: 105 MMOL/L (ref 98–107)
CO2 SERPL-SCNC: 26 MMOL/L (ref 22–29)
CREAT SERPL-MCNC: 0.8 MG/DL (ref 0.5–1)
ECHO BSA: 1.7 M2
EOSINOPHIL # BLD: 0.1 K/UL (ref 0.05–0.5)
EOSINOPHILS RELATIVE PERCENT: 2 % (ref 0–6)
ERYTHROCYTE [DISTWIDTH] IN BLOOD BY AUTOMATED COUNT: 13 % (ref 11.5–15)
GFR, ESTIMATED: 79 ML/MIN/1.73M2
GLUCOSE SERPL-MCNC: 102 MG/DL (ref 74–99)
HCT VFR BLD AUTO: 34.9 % (ref 34–48)
HGB BLD-MCNC: 11.1 G/DL (ref 11.5–15.5)
IMM GRANULOCYTES # BLD AUTO: 0.03 K/UL (ref 0–0.58)
IMM GRANULOCYTES NFR BLD: 1 % (ref 0–5)
LYMPHOCYTES NFR BLD: 1.42 K/UL (ref 1.5–4)
LYMPHOCYTES RELATIVE PERCENT: 24 % (ref 20–42)
MCH RBC QN AUTO: 29.9 PG (ref 26–35)
MCHC RBC AUTO-ENTMCNC: 31.8 G/DL (ref 32–34.5)
MCV RBC AUTO: 94.1 FL (ref 80–99.9)
MONOCYTES NFR BLD: 0.44 K/UL (ref 0.1–0.95)
MONOCYTES NFR BLD: 7 % (ref 2–12)
NEUTROPHILS NFR BLD: 66 % (ref 43–80)
NEUTS SEG NFR BLD: 3.96 K/UL (ref 1.8–7.3)
NUC STRESS EJECTION FRACTION: 57 %
PLATELET # BLD AUTO: 306 K/UL (ref 130–450)
PMV BLD AUTO: 8.6 FL (ref 7–12)
POTASSIUM SERPL-SCNC: 4.1 MMOL/L (ref 3.5–5)
RBC # BLD AUTO: 3.71 M/UL (ref 3.5–5.5)
SODIUM SERPL-SCNC: 139 MMOL/L (ref 132–146)
STRESS BASELINE DIAS BP: 70 MMHG
STRESS BASELINE HR: 56 BPM
STRESS BASELINE SYS BP: 160 MMHG
STRESS ESTIMATED WORKLOAD: 1 METS
STRESS PEAK DIAS BP: 70 MMHG
STRESS PEAK SYS BP: 160 MMHG
STRESS PERCENT HR ACHIEVED: 66 %
STRESS POST PEAK HR: 94 BPM
STRESS RATE PRESSURE PRODUCT: NORMAL BPM*MMHG
STRESS TARGET HR: 143 BPM
WBC OTHER # BLD: 6 K/UL (ref 4.5–11.5)

## 2024-07-28 PROCEDURE — 6360000002 HC RX W HCPCS: Performed by: INTERNAL MEDICINE

## 2024-07-28 PROCEDURE — 78452 HT MUSCLE IMAGE SPECT MULT: CPT

## 2024-07-28 PROCEDURE — 3430000000 HC RX DIAGNOSTIC RADIOPHARMACEUTICAL: Performed by: RADIOLOGY

## 2024-07-28 PROCEDURE — 36415 COLL VENOUS BLD VENIPUNCTURE: CPT

## 2024-07-28 PROCEDURE — 6370000000 HC RX 637 (ALT 250 FOR IP): Performed by: INTERNAL MEDICINE

## 2024-07-28 PROCEDURE — A9500 TC99M SESTAMIBI: HCPCS | Performed by: RADIOLOGY

## 2024-07-28 PROCEDURE — 2580000003 HC RX 258: Performed by: NURSE PRACTITIONER

## 2024-07-28 PROCEDURE — 2060000000 HC ICU INTERMEDIATE R&B

## 2024-07-28 PROCEDURE — 6370000000 HC RX 637 (ALT 250 FOR IP): Performed by: NURSE PRACTITIONER

## 2024-07-28 PROCEDURE — 93016 CV STRESS TEST SUPVJ ONLY: CPT | Performed by: INTERNAL MEDICINE

## 2024-07-28 PROCEDURE — 93017 CV STRESS TEST TRACING ONLY: CPT

## 2024-07-28 PROCEDURE — 99223 1ST HOSP IP/OBS HIGH 75: CPT | Performed by: STUDENT IN AN ORGANIZED HEALTH CARE EDUCATION/TRAINING PROGRAM

## 2024-07-28 PROCEDURE — 99232 SBSQ HOSP IP/OBS MODERATE 35: CPT | Performed by: INTERNAL MEDICINE

## 2024-07-28 PROCEDURE — 80048 BASIC METABOLIC PNL TOTAL CA: CPT

## 2024-07-28 PROCEDURE — 93018 CV STRESS TEST I&R ONLY: CPT | Performed by: INTERNAL MEDICINE

## 2024-07-28 PROCEDURE — 85025 COMPLETE CBC W/AUTO DIFF WBC: CPT

## 2024-07-28 PROCEDURE — 78452 HT MUSCLE IMAGE SPECT MULT: CPT | Performed by: INTERNAL MEDICINE

## 2024-07-28 RX ORDER — TETRAKIS(2-METHOXYISOBUTYLISOCYANIDE)COPPER(I) TETRAFLUOROBORATE 1 MG/ML
30 INJECTION, POWDER, LYOPHILIZED, FOR SOLUTION INTRAVENOUS
Status: COMPLETED | OUTPATIENT
Start: 2024-07-28 | End: 2024-07-28

## 2024-07-28 RX ORDER — REGADENOSON 0.08 MG/ML
0.4 INJECTION, SOLUTION INTRAVENOUS
Status: COMPLETED | OUTPATIENT
Start: 2024-07-28 | End: 2024-07-28

## 2024-07-28 RX ORDER — ZOLPIDEM TARTRATE 5 MG/1
10 TABLET ORAL ONCE
Status: COMPLETED | OUTPATIENT
Start: 2024-07-28 | End: 2024-07-28

## 2024-07-28 RX ORDER — TETRAKIS(2-METHOXYISOBUTYLISOCYANIDE)COPPER(I) TETRAFLUOROBORATE 1 MG/ML
12 INJECTION, POWDER, LYOPHILIZED, FOR SOLUTION INTRAVENOUS
Status: COMPLETED | OUTPATIENT
Start: 2024-07-28 | End: 2024-07-28

## 2024-07-28 RX ADMIN — METOPROLOL SUCCINATE 50 MG: 50 TABLET, EXTENDED RELEASE ORAL at 21:41

## 2024-07-28 RX ADMIN — SODIUM CHLORIDE, PRESERVATIVE FREE 10 ML: 5 INJECTION INTRAVENOUS at 21:42

## 2024-07-28 RX ADMIN — APIXABAN 5 MG: 5 TABLET, FILM COATED ORAL at 07:55

## 2024-07-28 RX ADMIN — ATORVASTATIN CALCIUM 20 MG: 20 TABLET, FILM COATED ORAL at 07:55

## 2024-07-28 RX ADMIN — APIXABAN 5 MG: 5 TABLET, FILM COATED ORAL at 21:41

## 2024-07-28 RX ADMIN — SODIUM CHLORIDE, PRESERVATIVE FREE 10 ML: 5 INJECTION INTRAVENOUS at 08:40

## 2024-07-28 RX ADMIN — ZOLPIDEM TARTRATE 10 MG: 5 TABLET ORAL at 21:41

## 2024-07-28 RX ADMIN — VALSARTAN 80 MG: 80 TABLET, FILM COATED ORAL at 07:55

## 2024-07-28 RX ADMIN — Medication 12 MILLICURIE: at 08:23

## 2024-07-28 RX ADMIN — METOPROLOL SUCCINATE 50 MG: 50 TABLET, EXTENDED RELEASE ORAL at 07:55

## 2024-07-28 RX ADMIN — Medication 30 MILLICURIE: at 11:17

## 2024-07-28 RX ADMIN — REGADENOSON 0.4 MG: 0.08 INJECTION, SOLUTION INTRAVENOUS at 09:53

## 2024-07-28 RX ADMIN — ASPIRIN 81 MG: 81 TABLET, COATED ORAL at 07:55

## 2024-07-28 ASSESSMENT — PAIN SCALES - GENERAL
PAINLEVEL_OUTOF10: 0

## 2024-07-28 NOTE — PROGRESS NOTES
[facility]   Progress note      Subjective:  Denies chest pain and dyspnea  Denies palpitations  Complains of constant dizziness and lightheadedness without syncope    Objective:  Sitting up in bed, awaiting stress test  Conversing without difficulty  No acute distress    BP (!) 140/71   Pulse 61   Temp 98.1 °F (36.7 °C) (Oral)   Resp 20   Ht 1.6 m (5' 3\")   Wt 65.3 kg (144 lb)   SpO2 98%   BMI 25.51 kg/m²     In: 720 [P.O.:720]  Out: 0   In: 720   Out: 0     General appearance: NAD, conversant  HEENT: AT/NC, MMM  Neck: FROM, supple  Lungs: Clear to auscultation  CV: RRR, no MRGs  Vasc: Radial pulses 2+  Abdomen: Soft, non-tender; no masses or HSM  Extremities: No peripheral edema or digital cyanosis  Skin: no rash, lesions or ulcers  Psych: Alert and oriented to person, place and time  Neuro: Alert and interactive     Recent Labs     07/26/24  0508 07/27/24  0549 07/28/24  0503   WBC 6.7 6.3 6.0   HGB 10.7* 11.1* 11.1*   HCT 33.5* 33.8* 34.9    320 306       Recent Labs     07/26/24  0508 07/27/24  0549 07/28/24  0503    140 139   K 5.0 4.4 4.1   * 108* 105   CO2 25 22 26   BUN 18 14 16   CREATININE 0.9 0.8 0.8   CALCIUM 10.6* 10.3* 10.5*       Assessment/Plan:    Patient is a 79-year-old female with a hx of Afib on eliquis, HTN and CAD s/p CABG x 3 (LIMA-LAD, CryoVein-OM, CryoVein-RCA)/MYONR exclusion with 35mm AtriClip on 1/2/23 who is admitted to [facility] for  Near syncope-questionable syncope  -Sounds to be more of a vasovagal episode associated with nausea vomiting and loss of bowel function-no associated chest pain or shortness of breath  -Notably recently initiated on Pristiq which she feels did not sit well with her as she had queasiness and nausea the day before when she took it as well  -Monitor labs  -23/1.1 18/0.9>   -Continue IVF NS at 75  -Check orthostatic Bps  -Troponin 20-20  -Echo 1/6/23 > EF 45 to 50%, mild MR with moderate TR  -Continue to monitor

## 2024-07-28 NOTE — PROGRESS NOTES
PROGRESS NOTE     CARDIOLOGY    Chief complaint: Seen today for follow up, management & recommendations for coronary artery disease, syncope    She denies chest pain or shortness of breath today.  She states that she continues to feel lightheaded or dizzy.  She states that this is constant.    Wt Readings from Last 3 Encounters:   07/28/24 65.3 kg (144 lb)   07/18/24 65.1 kg (143 lb 8 oz)   06/21/24 67.1 kg (148 lb)     Temp Readings from Last 3 Encounters:   07/28/24 98.1 °F (36.7 °C) (Oral)   01/09/23 (!) 96.4 °F (35.8 °C) (Temporal)     BP Readings from Last 3 Encounters:   07/28/24 (!) 140/71   07/18/24 128/70   11/09/23 (!) 140/68     Pulse Readings from Last 3 Encounters:   07/28/24 61   07/18/24 77   11/09/23 70         Intake/Output Summary (Last 24 hours) at 7/28/2024 1101  Last data filed at 7/28/2024 0741  Gross per 24 hour   Intake 480 ml   Output 0 ml   Net 480 ml       Recent Labs     07/26/24  0508 07/27/24  0549 07/28/24  0503   WBC 6.7 6.3 6.0   HGB 10.7* 11.1* 11.1*   HCT 33.5* 33.8* 34.9   MCV 94.9 94.4 94.1    320 306     Recent Labs     07/26/24  0508 07/27/24  0549 07/28/24  0503    140 139   K 5.0 4.4 4.1   * 108* 105   CO2 25 22 26   BUN 18 14 16   CREATININE 0.9 0.8 0.8   MG  --  2.1  --      No results for input(s): \"PROTIME\", \"INR\" in the last 72 hours.  No results for input(s): \"CKTOTAL\", \"CKMB\", \"CKMBINDEX\", \"TROPONINI\" in the last 72 hours.  No results for input(s): \"BNP\" in the last 72 hours.  Recent Labs     07/27/24  0549   HDL 71     Recent Labs     07/25/24  1315 07/25/24  1405   TROPHS 20* 20*         technetium sestamibi (CARDIOLITE) injection 30 millicurie, ONCE PRN  perflutren lipid microspheres (DEFINITY) injection 1.5 mL, ONCE PRN  metoprolol succinate (TOPROL XL) extended release tablet 50 mg, BID  aspirin EC tablet 81 mg, Daily  apixaban (ELIQUIS) tablet 5 mg, BID  atorvastatin (LIPITOR) tablet 20 mg, Daily  valsartan (DIOVAN) tablet 80 mg, Daily  0.9  dizziness/lightheadedness.  Nonsustained VT  Ischemic cardiomyopathy.  Euvolemic and well compensated today.  Blood pressure is too high.  Toprol increased.  Syncope.  Symptoms occur while seated.  Unlikely to be orthostatic.  Echo and stress today will defer further workup and management to EP.  Paroxysmal atrial fibrillation    Note: This report was completed using computerized voice recognition software. Every effort has been made to ensure accuracy, however; and invert and computerized transcription errors may be present.

## 2024-07-28 NOTE — CONSULTS
- CNP        acetaminophen (TYLENOL) tablet 650 mg  650 mg Oral Q6H PRN Sarah Dowd APRN - CNP   650 mg at 07/27/24 0946    Or    acetaminophen (TYLENOL) suppository 650 mg  650 mg Rectal Q6H PRN Sarah Dowd APRN - CNP        melatonin tablet 3 mg  3 mg Oral Nightly PRN Hector-Usama, April, APRN - CNP   3 mg at 07/25/24 2116        Allergies   Allergen Reactions    Lipitor [Atorvastatin]      Caused my bones to ache        ROS:   Constitutional: Negative for fever, activity change and appetite change.   HENT: Negative for epistaxis.   Eyes: Negative for diploplia, blurred vision.   Respiratory: Negative for cough, chest tightness, shortness of breath and wheezing.   Cardiovascular: pertinent positives in HPI  Gastrointestinal: Negative for abdominal pain and blood in stool.   Genitourinary: Negative for hematuria and difficulty urinating.   Musculoskeletal: Negative for myalgias and gait problem.   Skin: Negative for color change and rash.   Neurological: Negative for syncope and light-headedness.   Psychiatric/Behavioral: Negative for confusion and agitation. The patient is not nervous/anxious.  Heme: no bleeding disorders, no melena or hematochezia  All other review of systems are negative     PHYSICAL EXAM:  Constitutional   Vitals:    07/28/24 0000 07/28/24 0415 07/28/24 0741 07/28/24 0846   BP: 135/77 (!) 147/70 (!) 140/71    Pulse: 58 55 61    Resp: 18 20 20    Temp: 97.8 °F (36.6 °C) 97.7 °F (36.5 °C) 98.1 °F (36.7 °C)    TempSrc: Temporal Temporal Oral    SpO2:       Weight:    65.3 kg (144 lb)   Height:    1.6 m (5' 3\")    Well-developed, no acute distress, well groomed  Eyes: conjunctivae normal, no xanthelasma   Ears, Nose, Throat: oral mucosa moist, no cyanosis   Neck: supple, no JVD, no bruits, no thyromegaly   CV: normal rate, regular rhythm,  no murmurs, rubs, or gallops. PMI is nondisplaced, Peripheral pulses normal including carotid auscultation, no noted aortic bruit, bilateral  femoral and pedal pulses are normal in quality  Lungs: clear to auscultation bilaterally, normal respiratory effort without used of accessory muscles, no wheezes  Abdomen: soft, non-tender, bowel sounds present, no masses or hepatomegaly   Extremities: no digital clubbing, no edema   Skin: warm, no rashes   Neuro/Psych: A&O x 3, normal mood and affect    Data:    Recent Labs     07/26/24  0508 07/27/24  0549 07/28/24  0503   WBC 6.7 6.3 6.0   HGB 10.7* 11.1* 11.1*   HCT 33.5* 33.8* 34.9    320 306     Recent Labs     07/26/24  0508 07/27/24  0549 07/28/24  0503    140 139   K 5.0 4.4 4.1   * 108* 105   CO2 25 22 26   BUN 18 14 16   CREATININE 0.9 0.8 0.8   CALCIUM 10.6* 10.3* 10.5*     No results for input(s): \"INR\" in the last 72 hours.  Recent Labs     07/27/24  0549   TSH 2.61     Lab Results   Component Value Date/Time    MG 2.1 07/27/2024 05:49 AM     Telemetry: Sinus at 52-82 bpm, no events    Assessment/plan:  Syncope/OH  - Orthostatic VS consistent with OH.  - Recommend drink at least 60 fluid ounces of water daily.   - Change positions slowly. Return to prior position if develop symptoms until symptoms resolve.  - Recommend sleep with head of bed elevated at 30 degrees.  - She is on multiple medications associated with hypotension/OH, including cilostazol, metoprolol XL, valsartan, zolpidem, and desvenlafaxamine. Recommend avoid medications with this side effect if possible. Devenlafaxamine and cilostazol discontinued.  -Treated with IV fluids-now discontinued. Recheck orthostatic VS today.  - Consider change from knee-high to thigh high compression stockings.  -Due to her history of CAD and sudden syncope, including 1 episode in a seated position, I would recommend EP study with plan for secondary prevention ICD if indicated based on EP study result.  Due to EP lab staffing limitations, will plan to perform EP study +/- ICD implant on 7/30/2024.  I attempted to contact patient's daughter

## 2024-07-29 ENCOUNTER — APPOINTMENT (OUTPATIENT)
Age: 79
DRG: 274 | End: 2024-07-29
Attending: INTERNAL MEDICINE
Payer: MEDICARE

## 2024-07-29 LAB
ANION GAP SERPL CALCULATED.3IONS-SCNC: 8 MMOL/L (ref 7–16)
BASOPHILS # BLD: 0.04 K/UL (ref 0–0.2)
BASOPHILS NFR BLD: 1 % (ref 0–2)
BUN SERPL-MCNC: 28 MG/DL (ref 6–23)
CALCIUM SERPL-MCNC: 10.4 MG/DL (ref 8.6–10.2)
CHLORIDE SERPL-SCNC: 106 MMOL/L (ref 98–107)
CO2 SERPL-SCNC: 24 MMOL/L (ref 22–29)
CREAT SERPL-MCNC: 1.1 MG/DL (ref 0.5–1)
ECHO AO ASC DIAM: 3.3 CM
ECHO AO ASCENDING AORTA INDEX: 1.96 CM/M2
ECHO AV AREA PEAK VELOCITY: 1.5 CM2
ECHO AV AREA VTI: 1.6 CM2
ECHO AV AREA/BSA PEAK VELOCITY: 0.9 CM2/M2
ECHO AV AREA/BSA VTI: 1 CM2/M2
ECHO AV CUSP MM: 1.5 CM
ECHO AV MEAN GRADIENT: 5 MMHG
ECHO AV MEAN VELOCITY: 1 M/S
ECHO AV PEAK GRADIENT: 9 MMHG
ECHO AV PEAK VELOCITY: 1.5 M/S
ECHO AV VELOCITY RATIO: 0.6
ECHO AV VTI: 30.8 CM
ECHO BSA: 1.7 M2
ECHO EST RA PRESSURE: 3 MMHG
ECHO LA DIAMETER INDEX: 2.02 CM/M2
ECHO LA DIAMETER: 3.4 CM
ECHO LA VOL A-L A2C: 64 ML (ref 22–52)
ECHO LA VOL A-L A4C: 25 ML (ref 22–52)
ECHO LA VOL MOD A2C: 62 ML (ref 22–52)
ECHO LA VOL MOD A4C: 24 ML (ref 22–52)
ECHO LA VOLUME AREA LENGTH: 41 ML
ECHO LA VOLUME INDEX A-L A2C: 38 ML/M2 (ref 16–34)
ECHO LA VOLUME INDEX A-L A4C: 15 ML/M2 (ref 16–34)
ECHO LA VOLUME INDEX AREA LENGTH: 24 ML/M2 (ref 16–34)
ECHO LA VOLUME INDEX MOD A2C: 37 ML/M2 (ref 16–34)
ECHO LA VOLUME INDEX MOD A4C: 14 ML/M2 (ref 16–34)
ECHO LV EDV A2C: 86 ML
ECHO LV EDV A4C: 74 ML
ECHO LV EDV BP: 80 ML (ref 56–104)
ECHO LV EDV INDEX A4C: 44 ML/M2
ECHO LV EDV INDEX BP: 48 ML/M2
ECHO LV EDV NDEX A2C: 51 ML/M2
ECHO LV EF PHYSICIAN: 65 %
ECHO LV EJECTION FRACTION A2C: 63 %
ECHO LV EJECTION FRACTION A4C: 66 %
ECHO LV EJECTION FRACTION BIPLANE: 65 % (ref 55–100)
ECHO LV ESV A2C: 32 ML
ECHO LV ESV A4C: 25 ML
ECHO LV ESV BP: 28 ML (ref 19–49)
ECHO LV ESV INDEX A2C: 19 ML/M2
ECHO LV ESV INDEX A4C: 15 ML/M2
ECHO LV ESV INDEX BP: 17 ML/M2
ECHO LV FRACTIONAL SHORTENING: 19 % (ref 28–44)
ECHO LV INTERNAL DIMENSION DIASTOLE INDEX: 2.5 CM/M2
ECHO LV INTERNAL DIMENSION DIASTOLIC: 4.2 CM (ref 3.9–5.3)
ECHO LV INTERNAL DIMENSION SYSTOLIC INDEX: 2.02 CM/M2
ECHO LV INTERNAL DIMENSION SYSTOLIC: 3.4 CM
ECHO LV ISOVOLUMETRIC RELAXATION TIME (IVRT): 147.6 MS
ECHO LV IVSD: 1.5 CM (ref 0.6–0.9)
ECHO LV IVSS: 1.8 CM
ECHO LV MASS 2D: 189.2 G (ref 67–162)
ECHO LV MASS INDEX 2D: 112.6 G/M2 (ref 43–95)
ECHO LV POSTERIOR WALL DIASTOLIC: 1 CM (ref 0.6–0.9)
ECHO LV POSTERIOR WALL SYSTOLIC: 1.5 CM
ECHO LV RELATIVE WALL THICKNESS RATIO: 0.48
ECHO LVOT AREA: 2.5 CM2
ECHO LVOT AV VTI INDEX: 0.61
ECHO LVOT DIAM: 1.8 CM
ECHO LVOT MEAN GRADIENT: 2 MMHG
ECHO LVOT PEAK GRADIENT: 3 MMHG
ECHO LVOT PEAK VELOCITY: 0.9 M/S
ECHO LVOT STROKE VOLUME INDEX: 28.3 ML/M2
ECHO LVOT SV: 47.6 ML
ECHO LVOT VTI: 18.7 CM
ECHO MV "A" WAVE DURATION: 115.3 MSEC
ECHO MV A VELOCITY: 0.66 M/S
ECHO MV AREA PHT: 1.5 CM2
ECHO MV AREA VTI: 1.5 CM2
ECHO MV E DECELERATION TIME (DT): 355.9 MS
ECHO MV E VELOCITY: 0.73 M/S
ECHO MV E/A RATIO: 1.11
ECHO MV LVOT VTI INDEX: 1.74
ECHO MV MAX VELOCITY: 0.8 M/S
ECHO MV MEAN GRADIENT: 1 MMHG
ECHO MV MEAN VELOCITY: 0.6 M/S
ECHO MV PEAK GRADIENT: 3 MMHG
ECHO MV PRESSURE HALF TIME (PHT): 148.9 MS
ECHO MV VTI: 32.5 CM
ECHO PULMONARY ARTERY END DIASTOLIC PRESSURE: 3 MMHG
ECHO PV REGURGITANT MAX VELOCITY: 0.9 M/S
ECHO PVEIN A DURATION: 166.1 MS
ECHO PVEIN A VELOCITY: 0.2 M/S
ECHO PVEIN PEAK D VELOCITY: 0.4 M/S
ECHO PVEIN PEAK S VELOCITY: 0.5 M/S
ECHO PVEIN S/D RATIO: 1.3
ECHO RIGHT VENTRICULAR SYSTOLIC PRESSURE (RVSP): 23 MMHG
ECHO RV INTERNAL DIMENSION: 2.4 CM
ECHO TV REGURGITANT MAX VELOCITY: 2.25 M/S
ECHO TV REGURGITANT PEAK GRADIENT: 20 MMHG
EKG ATRIAL RATE: 60 BPM
EKG P AXIS: 71 DEGREES
EKG P-R INTERVAL: 154 MS
EKG Q-T INTERVAL: 400 MS
EKG QRS DURATION: 102 MS
EKG QTC CALCULATION (BAZETT): 400 MS
EKG R AXIS: 62 DEGREES
EKG T AXIS: 99 DEGREES
EKG VENTRICULAR RATE: 60 BPM
EOSINOPHIL # BLD: 0.17 K/UL (ref 0.05–0.5)
EOSINOPHILS RELATIVE PERCENT: 2 % (ref 0–6)
ERYTHROCYTE [DISTWIDTH] IN BLOOD BY AUTOMATED COUNT: 12.8 % (ref 11.5–15)
GFR, ESTIMATED: 53 ML/MIN/1.73M2
GLUCOSE SERPL-MCNC: 110 MG/DL (ref 74–99)
HCT VFR BLD AUTO: 34.1 % (ref 34–48)
HGB BLD-MCNC: 11 G/DL (ref 11.5–15.5)
IMM GRANULOCYTES # BLD AUTO: 0.06 K/UL (ref 0–0.58)
IMM GRANULOCYTES NFR BLD: 1 % (ref 0–5)
LYMPHOCYTES NFR BLD: 1.89 K/UL (ref 1.5–4)
LYMPHOCYTES RELATIVE PERCENT: 25 % (ref 20–42)
MCH RBC QN AUTO: 30.9 PG (ref 26–35)
MCHC RBC AUTO-ENTMCNC: 32.3 G/DL (ref 32–34.5)
MCV RBC AUTO: 95.8 FL (ref 80–99.9)
MONOCYTES NFR BLD: 0.67 K/UL (ref 0.1–0.95)
MONOCYTES NFR BLD: 9 % (ref 2–12)
NEUTROPHILS NFR BLD: 63 % (ref 43–80)
NEUTS SEG NFR BLD: 4.75 K/UL (ref 1.8–7.3)
PLATELET # BLD AUTO: 299 K/UL (ref 130–450)
PMV BLD AUTO: 9 FL (ref 7–12)
POTASSIUM SERPL-SCNC: 4.7 MMOL/L (ref 3.5–5)
RBC # BLD AUTO: 3.56 M/UL (ref 3.5–5.5)
SODIUM SERPL-SCNC: 138 MMOL/L (ref 132–146)
WBC OTHER # BLD: 7.6 K/UL (ref 4.5–11.5)

## 2024-07-29 PROCEDURE — 2580000003 HC RX 258: Performed by: NURSE PRACTITIONER

## 2024-07-29 PROCEDURE — 93306 TTE W/DOPPLER COMPLETE: CPT | Performed by: INTERNAL MEDICINE

## 2024-07-29 PROCEDURE — 2580000003 HC RX 258: Performed by: INTERNAL MEDICINE

## 2024-07-29 PROCEDURE — 97165 OT EVAL LOW COMPLEX 30 MIN: CPT

## 2024-07-29 PROCEDURE — 99233 SBSQ HOSP IP/OBS HIGH 50: CPT | Performed by: INTERNAL MEDICINE

## 2024-07-29 PROCEDURE — 2060000000 HC ICU INTERMEDIATE R&B

## 2024-07-29 PROCEDURE — 80048 BASIC METABOLIC PNL TOTAL CA: CPT

## 2024-07-29 PROCEDURE — 85025 COMPLETE CBC W/AUTO DIFF WBC: CPT

## 2024-07-29 PROCEDURE — 6370000000 HC RX 637 (ALT 250 FOR IP): Performed by: NURSE PRACTITIONER

## 2024-07-29 PROCEDURE — 36415 COLL VENOUS BLD VENIPUNCTURE: CPT

## 2024-07-29 PROCEDURE — 6370000000 HC RX 637 (ALT 250 FOR IP): Performed by: INTERNAL MEDICINE

## 2024-07-29 PROCEDURE — 93306 TTE W/DOPPLER COMPLETE: CPT

## 2024-07-29 PROCEDURE — 99232 SBSQ HOSP IP/OBS MODERATE 35: CPT | Performed by: INTERNAL MEDICINE

## 2024-07-29 PROCEDURE — 97535 SELF CARE MNGMENT TRAINING: CPT

## 2024-07-29 RX ORDER — HYDROXYZINE PAMOATE 25 MG/1
25 CAPSULE ORAL ONCE
Status: COMPLETED | OUTPATIENT
Start: 2024-07-29 | End: 2024-07-29

## 2024-07-29 RX ORDER — SODIUM CHLORIDE 9 MG/ML
INJECTION, SOLUTION INTRAVENOUS CONTINUOUS
Status: ACTIVE | OUTPATIENT
Start: 2024-07-29 | End: 2024-07-29

## 2024-07-29 RX ORDER — BISACODYL 5 MG/1
10 TABLET, DELAYED RELEASE ORAL ONCE
Status: COMPLETED | OUTPATIENT
Start: 2024-07-29 | End: 2024-07-29

## 2024-07-29 RX ORDER — DOCUSATE SODIUM 100 MG/1
100 CAPSULE, LIQUID FILLED ORAL DAILY
Status: DISCONTINUED | OUTPATIENT
Start: 2024-07-29 | End: 2024-08-02 | Stop reason: HOSPADM

## 2024-07-29 RX ADMIN — ACETAMINOPHEN 650 MG: 325 TABLET ORAL at 21:44

## 2024-07-29 RX ADMIN — SODIUM CHLORIDE: 9 INJECTION, SOLUTION INTRAVENOUS at 09:46

## 2024-07-29 RX ADMIN — BISACODYL 10 MG: 5 TABLET, COATED ORAL at 17:59

## 2024-07-29 RX ADMIN — SODIUM CHLORIDE, PRESERVATIVE FREE 10 ML: 5 INJECTION INTRAVENOUS at 09:48

## 2024-07-29 RX ADMIN — METOPROLOL SUCCINATE 50 MG: 50 TABLET, EXTENDED RELEASE ORAL at 09:47

## 2024-07-29 RX ADMIN — ATORVASTATIN CALCIUM 20 MG: 20 TABLET, FILM COATED ORAL at 09:47

## 2024-07-29 RX ADMIN — HYDROXYZINE PAMOATE 25 MG: 25 CAPSULE ORAL at 21:46

## 2024-07-29 RX ADMIN — APIXABAN 5 MG: 5 TABLET, FILM COATED ORAL at 09:47

## 2024-07-29 RX ADMIN — SODIUM CHLORIDE, PRESERVATIVE FREE 10 ML: 5 INJECTION INTRAVENOUS at 20:24

## 2024-07-29 RX ADMIN — DOCUSATE SODIUM 100 MG: 100 CAPSULE, LIQUID FILLED ORAL at 18:00

## 2024-07-29 RX ADMIN — ASPIRIN 81 MG: 81 TABLET, COATED ORAL at 09:47

## 2024-07-29 RX ADMIN — VALSARTAN 80 MG: 80 TABLET, FILM COATED ORAL at 09:47

## 2024-07-29 RX ADMIN — METOPROLOL SUCCINATE 50 MG: 50 TABLET, EXTENDED RELEASE ORAL at 20:24

## 2024-07-29 ASSESSMENT — PAIN SCALES - GENERAL
PAINLEVEL_OUTOF10: 0
PAINLEVEL_OUTOF10: 6
PAINLEVEL_OUTOF10: 0

## 2024-07-29 ASSESSMENT — PAIN DESCRIPTION - DESCRIPTORS: DESCRIPTORS: ACHING;THROBBING;DULL

## 2024-07-29 ASSESSMENT — PAIN DESCRIPTION - LOCATION: LOCATION: HEAD

## 2024-07-29 NOTE — PROGRESS NOTES
PROGRESS NOTE     CARDIOLOGY    Chief complaint: Seen today for follow up, management & recommendations for coronary artery disease, syncope    She denies chest pain or shortness of breath today.  She was lying flat in bed receiving her echo.  She was comfortable and in no distress    Wt Readings from Last 3 Encounters:   07/29/24 65.3 kg (144 lb)   07/18/24 65.1 kg (143 lb 8 oz)   06/21/24 67.1 kg (148 lb)     Temp Readings from Last 3 Encounters:   07/29/24 98 °F (36.7 °C) (Oral)   01/09/23 (!) 96.4 °F (35.8 °C) (Temporal)     BP Readings from Last 3 Encounters:   07/29/24 123/89   07/18/24 128/70   11/09/23 (!) 140/68     Pulse Readings from Last 3 Encounters:   07/29/24 75   07/18/24 77   11/09/23 70         Intake/Output Summary (Last 24 hours) at 7/29/2024 1035  Last data filed at 7/28/2024 1447  Gross per 24 hour   Intake 240 ml   Output --   Net 240 ml       Recent Labs     07/27/24  0549 07/28/24  0503 07/29/24  0436   WBC 6.3 6.0 7.6   HGB 11.1* 11.1* 11.0*   HCT 33.8* 34.9 34.1   MCV 94.4 94.1 95.8    306 299     Recent Labs     07/27/24  0549 07/28/24  0503 07/29/24  0436    139 138   K 4.4 4.1 4.7   * 105 106   CO2 22 26 24   BUN 14 16 28*   CREATININE 0.8 0.8 1.1*   MG 2.1  --   --      No results for input(s): \"PROTIME\", \"INR\" in the last 72 hours.  No results for input(s): \"CKTOTAL\", \"CKMB\", \"CKMBINDEX\", \"TROPONINI\" in the last 72 hours.  No results for input(s): \"BNP\" in the last 72 hours.  Recent Labs     07/27/24  0549   HDL 71     No results for input(s): \"TROPHS\" in the last 72 hours.        0.9 % sodium chloride infusion, Continuous  perflutren lipid microspheres (DEFINITY) injection 1.5 mL, ONCE PRN  metoprolol succinate (TOPROL XL) extended release tablet 50 mg, BID  aspirin EC tablet 81 mg, Daily  apixaban (ELIQUIS) tablet 5 mg, BID  atorvastatin (LIPITOR) tablet 20 mg, Daily  valsartan (DIOVAN) tablet 80 mg, Daily  0.9 % sodium chloride infusion, Continuous  sodium

## 2024-07-29 NOTE — CARE COORDINATION
Echo and stress completed. Cardiology signed off today. EP plans for EP study tomorrow. Patient doing well in room. Plan is home with no needs when stable.     For questions I can be reached at [phone]. BRIDGER Shelton

## 2024-07-29 NOTE — PROGRESS NOTES
[facility] CARDIOLOGY  CARDIAC ELECTROPHYSIOLOGY DEPARTMENT/DIVISION OF CARDIOLOGY  Inpatient Progress  Report  PATIENT: Betsy Monroy  MEDICAL RECORD NUMBER: [id number]  DATE OF SERVICE:  2024  PRIMARY ELECTROPHYSIOLOGIST: Vladislav Shepard DO   ATTENDING ELECTROPHYSIOLOGIST:  Clementina Allen MD   REFERRING PHYSICIAN: Nunu Rivera DO  CHIEF COMPLAINT: syncope    HPI:  is a 79 y.o. female with a history of prophylactic 35 mmAtriClip (1/3/23- Dr Lawrence), CAD sp CABG x 3 (1/3/23: LIMA-LAD,SVG-OM, SVG-RCA), HFmrEF, syncope, HTN, GERD/hiatal hernia.. She is managed by Dr Vazquez with aspirin 81 mg daily, apixaban 5 mg BID, cilostazol 100 mg BID, metoprolol XL 25 mg BID, simvastatin 40 mg daily, and valsartan 80 mg daily. In 2022, she was diagnosed with HFrEF-ischemic based on TTE and LHC. She was treated with CABG x 3. Additionally, prophylactic MYNOR AtriClip performed. No prior history of AF/AFL, but did have POAF after CABG/AtriClip. In -2024, she reportedly had a syncopal event while seated at the kitchen table. On , she presented with chief complaint of LOC. LOC occurred after position change from seated to standing position at hair appointment. She had fecal incontinence and N/V. Cardiology was consulted and recommended EP eval. Orthostatic VS performed multiple times and consistent with OH, including most recently (seated: 160/70 mMHg, 65 bpm; standin/70 mmHg, 75 bpm). EP service is now consulted for further evaluation/management of syncope.  Patient describes syncopal event in 2024 as described above with sudden LOC in a seated position.  She describes syncopal event that led to this admission as standing from seated position with sudden LOC and awakening on the floor.  She denies any other complaints at this time.    2024:She has had no recurrent NSVT and is maintaining sinus with rates in the 50's her LVEF was 65% today per TTE. She notes that the orthostatic  with normal LVEF will plan for cardiac MRI to assess for scar with possible ILR vs EP study after words.     CAD sp CABG x 3 (1/3/23: LIMA-LAD,SVG-OM, SVG-RCA)  - TTE: pending.  - Nuclear Stress Test: pending  - Management per Cardiology.    prophylactic 35 mmAtriClip (1/3/23- Dr Lawrence)   - No history of AF prior to CABG/AtriClip. She had POAF.   - Event monitor 2024: no AF/FL.  - Consider BRISEIDA or cardiac CTA to evaluate MYNOR AtriClip in future.    4.  Urinary urgency  - Patient is concerned about recommended hydration due to her urinary urgency issues.  Recommend she seek urogynecology evaluation in [place] [facility] the [facility], or [place].    Thank you for allowing me to participate in your patient's care.  Please call me if there are any questions.      I have spent a total of 10 minutes with the patient and the family reviewing the above stated recommendations.  And a total of >50% of that time involved face-to-face time providing counseling and or coordination of care with the other providers, reviewing records/tests, counseling/education of the patient, ordering medications/tests/procedures, coordinating care, and documenting clinical information in the EHR.     Discussed with Dr. Shepard.   LUCILLE Vazquez - CNP   Cardiac Electrophysiology  [facility]  [facility]    Attending Physician's Statement    Patient seen with the ANP. Agree with the findings, assessment and plan. Management plan was discussed. I have personally interviewed the patient, independently performed a focused cardiac examination, reviewed the pertinent laboratory and diagnostic testing with the patient and directly participated in the medical decision-making as noted above with the following additions:     Echo showed LV EF 6% and no wall motion abnormalities. Plan for cardiac MRI if no scar, will schedule ILR insertion. If scar noted then EP study with possible ICD or ILR.    I have spent a total of 50 minutes with the patient

## 2024-07-29 NOTE — PLAN OF CARE
Problem: Discharge Planning  Goal: Discharge to home or other facility with appropriate resources  Outcome: Progressing  Flowsheets (Taken 7/29/2024 0750)  Discharge to home or other facility with appropriate resources: Identify barriers to discharge with patient and caregiver     Problem: ABCDS Injury Assessment  Goal: Absence of physical injury  Outcome: Progressing     Problem: Safety - Adult  Goal: Free from fall injury  Outcome: Progressing     Problem: Pain  Goal: Verbalizes/displays adequate comfort level or baseline comfort level  Outcome: Progressing

## 2024-07-29 NOTE — PROGRESS NOTES
[facility]   Progress note      Subjective:  Denies chest pain and dyspnea  Denies palpitations  Complains of constant dizziness and lightheadedness without syncope  Complains of constipation    Objective:  Sitting up in bed, awaiting stress test  Conversing without difficulty  No acute distress    /89   Pulse 75   Temp 98 °F (36.7 °C) (Oral)   Resp 18   Ht 1.6 m (5' 3\")   Wt 65.3 kg (144 lb)   SpO2 98%   BMI 25.51 kg/m²     In: 240 [P.O.:240]  Out: -   In: 240   Out: -     General appearance: NAD, conversant  HEENT: AT/NC, MMM  Neck: FROM, supple  Lungs: Clear to auscultation  CV: RRR, no MRGs  Vasc: Radial pulses 2+  Abdomen: Soft, non-tender; no masses or HSM  Extremities: No peripheral edema or digital cyanosis.  FABIOLA hose (thigh-high) in place  Skin: no rash, lesions or ulcers  Psych: Alert and oriented to person, place and time  Neuro: Alert and interactive     Recent Labs     07/27/24  0549 07/28/24  0503 07/29/24  0436   WBC 6.3 6.0 7.6   HGB 11.1* 11.1* 11.0*   HCT 33.8* 34.9 34.1    306 299       Recent Labs     07/27/24  0549 07/28/24  0503 07/29/24  0436    139 138   K 4.4 4.1 4.7   * 105 106   CO2 22 26 24   BUN 14 16 28*   CREATININE 0.8 0.8 1.1*   CALCIUM 10.3* 10.5* 10.4*       Assessment/Plan:    Patient is a 79-year-old female with a hx of Afib on eliquis, HTN and CAD s/p CABG x 3 (LIMA-LAD, CryoVein-OM, CryoVein-RCA)/MYNOR exclusion with 35mm AtriClip on 1/2/23 who is admitted to [facility] for  Near syncope-questionable syncope  -Sounds to be more of a vasovagal episode associated with nausea vomiting and loss of bowel function-no associated chest pain or shortness of breath  -Notably recently initiated on Pristiq which she feels did not sit well with her as she had queasiness and nausea the day before when she took it as well  -Monitor labs  -23/1.1 18/0.9>   -Continue IVF NS at 75  -Check orthostatic Bps  -Troponin 20-20  -Echo 1/6/23 > EF 45  to 50%, mild MR with moderate TR  -Continue to monitor vital signs  -Given her strong cardiac history and currently wearing a Holter monitor at home-cardiology consultation to be obtained to ensure no cardiac related syncope  -If no further plans by cardiology-she may be discharged home     Atrial fibrillation  -Continue home metoprolol XL 25 mg twice daily for rate control  -Continue Eliquis     Hypertension  -Continue home medications with parameters  -Continue to monitor Bps    7/27/2024  I had discharged the patient yesterday however it appears discharge was held  Cardiology saw her today and would like to do a stress echo and EP consultation  Symptoms sound to be vasovagal but she does have a strong cardiac history-will defer further plans to EP/cardiology  She is agreeable to stay now-she was quite anxious for discharge yesterday if she was not going to be evaluated by cardiology  Blood work unremarkable  Vital signs stable  No further episodes of near syncope  Discontinue Pristiq    07/28/2024  VS stable  Stress test and echocardiogram per Cardiology  EP consulted per Cardiology  Monitor BP/HR  Toprol increased as per Cardiology  Monitor CBC, BMP    07/29/2024  VS stable  Lexiscan MPS nonischemic  Awaiting TTE results  EP testing with possible ICD placement tentatively scheduled for tomorrow  Monitor BP/HR  Continue telemetry monitoring  Monitor BMP/CBC  Discharge when okay with all consultants        Code Status: Full code  Consultants: Cardiology  DVT Prophylaxis: Eliquis  PT/OT  Discharge planning         I have spent a total time of 25 minutes of this patient encounter reviewing chart, labs, radiological reports coordinating care with interdisciplinary teams, face to face encounter with patient, providing counseling/education to patient/family, and formulating plan.       Cuca Russell, LUCILLE - CNP  10:08 AM  7/29/2024

## 2024-07-29 NOTE — PROGRESS NOTES
OCCUPATIONAL THERAPY INITIAL EVALUATION    [facility]  [address], [place]      Date:2024                                                  Patient Name: Betsy Monroy  MRN: [id number]  : 1945  Room: [address] [place] [address]    Evaluating OT: Ann-Marie Sharma, ANNITA, OTR/L  # [id number]    Referring Provider:      Sarah Dowd APRN - CNP     Specific Provider Orders:  \"OT Eval and Treat\"  24    Diagnosis: DIANNA (acute kidney injury) (HCC) [N17.9]  Syncope, near [R55]  Syncope, unspecified syncope type [R55]    Pt was admitted after syncopal episode in community    Pertinent Medical History:  Pt has a past medical history of ARJUN (cerebral atherosclerosis), History of blood transfusion, Hyperlipidemia, and Hypertension.,  has a past surgical history that includes Tonsillectomy; Colonoscopy; Upper gastrointestinal endoscopy (N/A, 2022); Cardiac catheterization (2022); and Coronary artery bypass graft (N/A, 1/3/2023).    Surgeries this admission: None     Precautions:  Fall Risk  Monitor BP    Assessment of current deficits   [x] Functional mobility  [x]ADLs  [] Strength               []Cognition   [x] Functional transfers   [x] IADLs         [x] Safety Awareness   [x]Endurance   [] Fine Coordination              [x] Balance     [] Vision/perception   []Sensation    []Gross Motor Coordination  [] ROM  [] Delirium                  [] Motor Control       OT PLAN OF CARE   OT POC based on physician orders, patient diagnosis and results of clinical assessment    Frequency/Duration 1-3 days/wk for 2 weeks PRN   Specific OT Treatment to include:     * Instruction/training on adapted ADL techniques and AE recommendations to increase functional independence within precautions       * Training on energy conservation strategies, correct breathing pattern and techniques to improve independence/tolerance for self-care routine  * Functional  WNL FMC/dexterity noted during ADL tasks       Sensation:  Denies numbness or tingling Valeriano UEs   Tone: WFL Valeriano UEs   Edema: None Noted Valeriano UEs     Comments: Upon arrival, patient was found in semi-supine.  She was agreeable to participate in therapeutic ax.  No Family present during session.  Received permission from RN prior to engaging pt in OT services.  Educated pt on role of OT services.      At the end of the session, patient was properly positioned in Semi-Supine - declined b/s chair.  Call light and phone within reach, all lines and tubes intact.  Oriented pt to call bell.  Made all appropriate Environmental Modifications to facilitate pt's level of IND and safety.  All needs met.  Bed Alarm activated.  Notified RN of pt's position and performance.          Overall patient demonstrated decreased independence and safety during completion of ADL/functional transfer/mobility tasks.  Pt would benefit from continued skilled OT to increase safety and independence with completion of ADL/IADL tasks for functional independence and quality of life.    Treatment: OT treatment provided this date includes:   Instruction/training on safety and adapted techniques for completion of ADLs, use of DME/AD/Adaptive equip;  within precautions   Instruction/training on safe functional mobility/transfer techniques, use of DME/AD;  within precautions      Instruction/training on energy conservation techs (EC)/Work Simplification/PLB for completion of ADLs:     Neuromuscular Reeducation to facilitate balance/righting reactions for increased function with ADLs:    Skilled positioning/alignment to maximize Pt's safety and ability to safely and INDly interact w/ his/her environment, maximize respiratory status  Activity tolerance - Sitting/Standing to improve endurance w/ functional ax   Cognitive retraining - Cues for safety/safety awareness, sequencing, problem solving    Skilled monitoring of Vitals during session and pt's response

## 2024-07-30 LAB
ANION GAP SERPL CALCULATED.3IONS-SCNC: 12 MMOL/L (ref 7–16)
BASOPHILS # BLD: 0.03 K/UL (ref 0–0.2)
BASOPHILS NFR BLD: 1 % (ref 0–2)
BUN SERPL-MCNC: 22 MG/DL (ref 6–23)
CALCIUM SERPL-MCNC: 10.3 MG/DL (ref 8.6–10.2)
CHLORIDE SERPL-SCNC: 111 MMOL/L (ref 98–107)
CO2 SERPL-SCNC: 23 MMOL/L (ref 22–29)
CREAT SERPL-MCNC: 0.8 MG/DL (ref 0.5–1)
EOSINOPHIL # BLD: 0.16 K/UL (ref 0.05–0.5)
EOSINOPHILS RELATIVE PERCENT: 3 % (ref 0–6)
ERYTHROCYTE [DISTWIDTH] IN BLOOD BY AUTOMATED COUNT: 13 % (ref 11.5–15)
GFR, ESTIMATED: 75 ML/MIN/1.73M2
GLUCOSE SERPL-MCNC: 105 MG/DL (ref 74–99)
HCT VFR BLD AUTO: 33.4 % (ref 34–48)
HGB BLD-MCNC: 10.8 G/DL (ref 11.5–15.5)
IMM GRANULOCYTES # BLD AUTO: 0.04 K/UL (ref 0–0.58)
IMM GRANULOCYTES NFR BLD: 1 % (ref 0–5)
LYMPHOCYTES NFR BLD: 1.58 K/UL (ref 1.5–4)
LYMPHOCYTES RELATIVE PERCENT: 24 % (ref 20–42)
MCH RBC QN AUTO: 30.3 PG (ref 26–35)
MCHC RBC AUTO-ENTMCNC: 32.3 G/DL (ref 32–34.5)
MCV RBC AUTO: 93.6 FL (ref 80–99.9)
MONOCYTES NFR BLD: 0.58 K/UL (ref 0.1–0.95)
MONOCYTES NFR BLD: 9 % (ref 2–12)
NEUTROPHILS NFR BLD: 63 % (ref 43–80)
NEUTS SEG NFR BLD: 4.13 K/UL (ref 1.8–7.3)
PLATELET # BLD AUTO: 300 K/UL (ref 130–450)
PMV BLD AUTO: 9 FL (ref 7–12)
POTASSIUM SERPL-SCNC: 4.2 MMOL/L (ref 3.5–5)
RBC # BLD AUTO: 3.57 M/UL (ref 3.5–5.5)
SODIUM SERPL-SCNC: 146 MMOL/L (ref 132–146)
WBC OTHER # BLD: 6.5 K/UL (ref 4.5–11.5)

## 2024-07-30 PROCEDURE — 36415 COLL VENOUS BLD VENIPUNCTURE: CPT

## 2024-07-30 PROCEDURE — 6370000000 HC RX 637 (ALT 250 FOR IP): Performed by: NURSE PRACTITIONER

## 2024-07-30 PROCEDURE — 85025 COMPLETE CBC W/AUTO DIFF WBC: CPT

## 2024-07-30 PROCEDURE — 99233 SBSQ HOSP IP/OBS HIGH 50: CPT | Performed by: INTERNAL MEDICINE

## 2024-07-30 PROCEDURE — 6370000000 HC RX 637 (ALT 250 FOR IP): Performed by: INTERNAL MEDICINE

## 2024-07-30 PROCEDURE — 2580000003 HC RX 258: Performed by: NURSE PRACTITIONER

## 2024-07-30 PROCEDURE — 2060000000 HC ICU INTERMEDIATE R&B

## 2024-07-30 PROCEDURE — 80048 BASIC METABOLIC PNL TOTAL CA: CPT

## 2024-07-30 RX ORDER — ZOLPIDEM TARTRATE 5 MG/1
5 TABLET ORAL NIGHTLY PRN
Status: DISCONTINUED | OUTPATIENT
Start: 2024-07-30 | End: 2024-08-02 | Stop reason: HOSPADM

## 2024-07-30 RX ORDER — MECOBALAMIN 5000 MCG
5 TABLET,DISINTEGRATING ORAL NIGHTLY
Status: DISCONTINUED | OUTPATIENT
Start: 2024-07-30 | End: 2024-08-02 | Stop reason: HOSPADM

## 2024-07-30 RX ORDER — VALSARTAN 80 MG/1
80 TABLET ORAL
Status: DISCONTINUED | OUTPATIENT
Start: 2024-07-31 | End: 2024-08-02 | Stop reason: HOSPADM

## 2024-07-30 RX ADMIN — ATORVASTATIN CALCIUM 20 MG: 20 TABLET, FILM COATED ORAL at 09:02

## 2024-07-30 RX ADMIN — ASPIRIN 81 MG: 81 TABLET, COATED ORAL at 09:02

## 2024-07-30 RX ADMIN — ZOLPIDEM TARTRATE 5 MG: 5 TABLET ORAL at 21:30

## 2024-07-30 RX ADMIN — METOPROLOL SUCCINATE 50 MG: 50 TABLET, EXTENDED RELEASE ORAL at 09:02

## 2024-07-30 RX ADMIN — SODIUM CHLORIDE, PRESERVATIVE FREE 10 ML: 5 INJECTION INTRAVENOUS at 21:30

## 2024-07-30 RX ADMIN — METOPROLOL SUCCINATE 50 MG: 50 TABLET, EXTENDED RELEASE ORAL at 21:30

## 2024-07-30 RX ADMIN — VALSARTAN 80 MG: 80 TABLET, FILM COATED ORAL at 09:02

## 2024-07-30 RX ADMIN — SODIUM CHLORIDE, PRESERVATIVE FREE 10 ML: 5 INJECTION INTRAVENOUS at 09:02

## 2024-07-30 ASSESSMENT — PAIN SCALES - GENERAL: PAINLEVEL_OUTOF10: 0

## 2024-07-30 NOTE — CARE COORDINATION
For cardiac MRI tomorrow then to be evaled for loop recorder vs. EP studies. Plan is home with no needs when released.     For questions I can be reached at [phone]. BRIDGER Shelton

## 2024-07-30 NOTE — PROGRESS NOTES
[facility] CARDIOLOGY  CARDIAC ELECTROPHYSIOLOGY DEPARTMENT/DIVISION OF CARDIOLOGY  Inpatient Progress  Report  PATIENT: Betsy Monroy  MEDICAL RECORD NUMBER: [id number]  DATE OF SERVICE:  2024  PRIMARY ELECTROPHYSIOLOGIST: Vladislav Shepard DO   ATTENDING ELECTROPHYSIOLOGIST:  Clementina Allen MD   REFERRING PHYSICIAN: Nunu Rivera DO  CHIEF COMPLAINT: syncope    HPI:  is a 79 y.o. female with a history of prophylactic 35 mmAtriClip (1/3/23- Dr Lawrence), CAD sp CABG x 3 (1/3/23: LIMA-LAD,SVG-OM, SVG-RCA), HFmrEF, syncope, HTN, GERD/hiatal hernia.. She is managed by Dr Vazquez with aspirin 81 mg daily, apixaban 5 mg BID, cilostazol 100 mg BID, metoprolol XL 25 mg BID, simvastatin 40 mg daily, and valsartan 80 mg daily. In 2022, she was diagnosed with HFrEF-ischemic based on TTE and LHC. She was treated with CABG x 3. Additionally, prophylactic MYNOR AtriClip performed. No prior history of AF/AFL, but did have POAF after CABG/AtriClip. In -2024, she reportedly had a syncopal event while seated at the kitchen table. On , she presented with chief complaint of LOC. LOC occurred after position change from seated to standing position at hair appointment. She had fecal incontinence and N/V. Cardiology was consulted and recommended EP eval. Orthostatic VS performed multiple times and consistent with OH, including most recently (seated: 160/70 mMHg, 65 bpm; standin/70 mmHg, 75 bpm). EP service is now consulted for further evaluation/management of syncope.  Patient describes syncopal event in 2024 as described above with sudden LOC in a seated position.  She describes syncopal event that led to this admission as standing from seated position with sudden LOC and awakening on the floor.  She denies any other complaints at this time.    2024:She has had no recurrent NSVT and is maintaining sinus with rates in the 50's her LVEF was 65% today per TTE. She notes that the orthostatic  symptoms are better. Will repeat orthos, she is agreeable to cardiac MRI with possible subsequent EP study.     7/30/24: She is maintaining sinus rhythm, no recurrent NSVT. Ortho's are somewhat better. cMRI scheduled for tomorrow 7/31/24. No acute events overnight.     Prior cardiac testing:  - TTE (07/29/2024): LVEF 65% with normal LV wall thickness mild MR  - 14 day event monitor (4/4/24): sinus at 56 - 124 bpm (mean: 79 bpm), no patient events, SVE burden = 1.1%, 48 brief episodes of SVT (longest: 15.4 sec at 114 bpm), VE burden < 1%, 2 episodes of NSVT (longest: 4 sec at 144 bpm), and no AF, AV block, or significant pauses.  - TTE (1/6/23): LVEF = 45-50% with anteroseptal and apical hypokinesis, stage II LVDD, mild MR, moderate TR, mild pulmonary HTN (RVSP = 41 mmHg).  - BRISEIDA (12/30/22): LVEF = \"moderately decreased\", moderate LAE, secondary moderate MR, mild TR.  - LHC (12/28/22): LVEF = 35-40%, LV dilation, RCA dominant circulation, 90% distal LM stenosis, 95% proximal 80-90% mid LAD stenoses, 50% mid Lcx stenosis,   - TTE (12/26/22): LVEF = 40% with akinetic apical and dyskinetic distal septal, and hypokinetic inferior wall, stage I LVDD, mild MR, mild TR, and moderate pulmonary HTN.  - Exercise Nuclear Stress (4/21/17): LVEF = \"normal\", distal anterior - apex ischemia, and no infarct, and average exercise capacity.  - Exercise Nuclear Stress (10/16/15): LVEF = 65%, mid-apical anterior ischemia, and 7 METS.     Past Medical History:   Diagnosis Date    ARJUN (cerebral atherosclerosis)     History of blood transfusion     Hyperlipidemia     Hypertension      Past Surgical History:   Procedure Laterality Date    CARDIAC CATHETERIZATION  12/30/2022    Dr. Milian[facility]    COLONOSCOPY      CORONARY ARTERY BYPASS GRAFT N/A 1/3/2023    CABG CORONARY ARTERY BYPASS, BRISEIDA performed by Ezequiel Lawrence MD at [facility] [place]    TONSILLECTOMY      UPPER GASTROINTESTINAL ENDOSCOPY N/A 12/27/2022    EGD ESOPHAGOGASTRODUODENOSCOPY performed

## 2024-07-30 NOTE — PLAN OF CARE
Problem: Discharge Planning  Goal: Discharge to home or other facility with appropriate resources  Outcome: Progressing  Flowsheets (Taken 7/30/2024 7790)  Discharge to home or other facility with appropriate resources: Identify barriers to discharge with patient and caregiver     Problem: ABCDS Injury Assessment  Goal: Absence of physical injury  Outcome: Progressing     Problem: Safety - Adult  Goal: Free from fall injury  Outcome: Progressing     Problem: Pain  Goal: Verbalizes/displays adequate comfort level or baseline comfort level  Outcome: Progressing

## 2024-07-30 NOTE — PROGRESS NOTES
[facility]   Progress note      Subjective:  Denies chest pain and dyspnea  She feels well today  She will be undergoing MRI coronaries tomorrow after which she may require a loop recorder  No lightheadedness or dizziness reported, she indicates she feels well    /76   Pulse 66   Temp 97.9 °F (36.6 °C) (Temporal)   Resp 18   Ht 1.6 m (5' 3\")   Wt 65.3 kg (144 lb)   SpO2 95%   BMI 25.51 kg/m²     No intake/output data recorded.  No intake/output data recorded.    General appearance: NAD, conversant  HEENT: AT/NC, MMM  Neck: FROM, supple  Lungs: Clear to auscultation  CV: RRR, no MRGs  Vasc: Radial pulses 2+  Abdomen: Soft, non-tender; no masses or HSM  Extremities: No peripheral edema or digital cyanosis.  FABIOLA hose (thigh-high) in place  Skin: no rash, lesions or ulcers  Psych: Alert and oriented to person, place and time  Neuro: Alert and interactive     Recent Labs     07/27/24  0549 07/28/24  0503 07/29/24  0436   WBC 6.3 6.0 7.6   HGB 11.1* 11.1* 11.0*   HCT 33.8* 34.9 34.1    306 299       Recent Labs     07/27/24  0549 07/28/24  0503 07/29/24  0436    139 138   K 4.4 4.1 4.7   * 105 106   CO2 22 26 24   BUN 14 16 28*   CREATININE 0.8 0.8 1.1*   CALCIUM 10.3* 10.5* 10.4*     07/28/2024 Lexiscan MPS:  Stress Combined Conclusion: The study is negative for myocardial ischemia. Findings suggest a low risk of cardiac events.  Stress Function: Left ventricular function post-stress is normal. Post-stress ejection fraction is 57%.  Perfusion Comments: LV perfusion is normal with attenuation artifacts.  There is no evidence of inducible ischemia.  ECG: Resting ECG demonstrates normal sinus rhythm.  Stress Test: A pharmacological stress test was performed using regadenoson (Lexiscan). PO caffeine given as a reversal agent. The patient reported nausea and no chest pain during the stress test. Symptoms began during stress and ended during recovery.  Resting ECG: The ECG  shows sinus rhythm. Lateral ST/T abnormality  Stress ECG: Inferior and lateral ST/T abnormality (about 1mm ST depresson)    07/29/2024 TTE (Dr. Gonzalez):  Left Ventricle: Normal left ventricular systolic function. EF by visual approximation is 65%. Left ventricle size is normal. Normal wall thickness. Normal wall motion. Grade I diastolic dysfunction with normal LAP.  Mitral Valve: Mild regurgitation.  Tricuspid Valve: Mild regurgitation. The estimated RVSP is 23 mmHg.         Assessment/Plan:  Patient is a 79-year-old female with a hx of Afib on eliquis, HTN and CAD s/p CABG x 3 (LIMA-LAD, CryoVein-OM, CryoVein-RCA)/MYNOR exclusion with 35mm AtriClip on 1/2/23 who is admitted to [facility] for  Near syncope-questionable syncope  -Sounds to be more of a vasovagal episode associated with nausea vomiting and loss of bowel function-no associated chest pain or shortness of breath  -Notably recently initiated on Pristiq which she feels did not sit well with her as she had queasiness and nausea the day before when she took it as well  -Monitor labs  -23/1.1 18/0.9>   -Continue IVF NS at 75  -Check orthostatic Bps  -Troponin 20-20  -Echo 1/6/23 > EF 45 to 50%, mild MR with moderate TR  -Continue to monitor vital signs  -Given her strong cardiac history and currently wearing a Holter monitor at home-cardiology consultation to be obtained to ensure no cardiac related syncope  -If no further plans by cardiology-she may be discharged home     Atrial fibrillation  -Continue home metoprolol XL 25 mg twice daily for rate control  -Continue Eliquis     Hypertension  -Continue home medications with parameters  -Continue to monitor Bps    7/27/2024  I had discharged the patient yesterday however it appears discharge was held  Cardiology saw her today and would like to do a stress echo and EP consultation  Symptoms sound to be vasovagal but she does have a strong cardiac history-will defer further plans to EP/cardiology  She is

## 2024-07-31 ENCOUNTER — APPOINTMENT (OUTPATIENT)
Dept: MRI IMAGING | Age: 79
DRG: 274 | End: 2024-07-31
Payer: MEDICARE

## 2024-07-31 LAB
ANION GAP SERPL CALCULATED.3IONS-SCNC: 12 MMOL/L (ref 7–16)
BASOPHILS # BLD: 0.03 K/UL (ref 0–0.2)
BASOPHILS NFR BLD: 1 % (ref 0–2)
BUN SERPL-MCNC: 21 MG/DL (ref 6–23)
CALCIUM SERPL-MCNC: 10.5 MG/DL (ref 8.6–10.2)
CHLORIDE SERPL-SCNC: 109 MMOL/L (ref 98–107)
CO2 SERPL-SCNC: 20 MMOL/L (ref 22–29)
CREAT SERPL-MCNC: 0.8 MG/DL (ref 0.5–1)
EOSINOPHIL # BLD: 0.14 K/UL (ref 0.05–0.5)
EOSINOPHILS RELATIVE PERCENT: 3 % (ref 0–6)
ERYTHROCYTE [DISTWIDTH] IN BLOOD BY AUTOMATED COUNT: 13 % (ref 11.5–15)
GFR, ESTIMATED: 78 ML/MIN/1.73M2
GLUCOSE SERPL-MCNC: 108 MG/DL (ref 74–99)
HCT VFR BLD AUTO: 31.2 % (ref 34–48)
HGB BLD-MCNC: 10.3 G/DL (ref 11.5–15.5)
IMM GRANULOCYTES # BLD AUTO: 0.05 K/UL (ref 0–0.58)
IMM GRANULOCYTES NFR BLD: 1 % (ref 0–5)
LYMPHOCYTES NFR BLD: 1.5 K/UL (ref 1.5–4)
LYMPHOCYTES RELATIVE PERCENT: 27 % (ref 20–42)
MCH RBC QN AUTO: 30.9 PG (ref 26–35)
MCHC RBC AUTO-ENTMCNC: 33 G/DL (ref 32–34.5)
MCV RBC AUTO: 93.7 FL (ref 80–99.9)
MONOCYTES NFR BLD: 0.5 K/UL (ref 0.1–0.95)
MONOCYTES NFR BLD: 9 % (ref 2–12)
NEUTROPHILS NFR BLD: 60 % (ref 43–80)
NEUTS SEG NFR BLD: 3.38 K/UL (ref 1.8–7.3)
PLATELET # BLD AUTO: 287 K/UL (ref 130–450)
PMV BLD AUTO: 9 FL (ref 7–12)
POTASSIUM SERPL-SCNC: 4.3 MMOL/L (ref 3.5–5)
RBC # BLD AUTO: 3.33 M/UL (ref 3.5–5.5)
SODIUM SERPL-SCNC: 141 MMOL/L (ref 132–146)
WBC OTHER # BLD: 5.6 K/UL (ref 4.5–11.5)

## 2024-07-31 PROCEDURE — 2060000000 HC ICU INTERMEDIATE R&B

## 2024-07-31 PROCEDURE — 99233 SBSQ HOSP IP/OBS HIGH 50: CPT | Performed by: INTERNAL MEDICINE

## 2024-07-31 PROCEDURE — 85025 COMPLETE CBC W/AUTO DIFF WBC: CPT

## 2024-07-31 PROCEDURE — 6370000000 HC RX 637 (ALT 250 FOR IP): Performed by: NURSE PRACTITIONER

## 2024-07-31 PROCEDURE — 75561 CARDIAC MRI FOR MORPH W/DYE: CPT | Performed by: INTERNAL MEDICINE

## 2024-07-31 PROCEDURE — 80048 BASIC METABOLIC PNL TOTAL CA: CPT

## 2024-07-31 PROCEDURE — A9579 GAD-BASE MR CONTRAST NOS,1ML: HCPCS | Performed by: RADIOLOGY

## 2024-07-31 PROCEDURE — 6370000000 HC RX 637 (ALT 250 FOR IP): Performed by: INTERNAL MEDICINE

## 2024-07-31 PROCEDURE — 6360000004 HC RX CONTRAST MEDICATION: Performed by: RADIOLOGY

## 2024-07-31 PROCEDURE — 2580000003 HC RX 258: Performed by: NURSE PRACTITIONER

## 2024-07-31 PROCEDURE — 75561 CARDIAC MRI FOR MORPH W/DYE: CPT

## 2024-07-31 PROCEDURE — 36415 COLL VENOUS BLD VENIPUNCTURE: CPT

## 2024-07-31 RX ADMIN — SODIUM CHLORIDE, PRESERVATIVE FREE 10 ML: 5 INJECTION INTRAVENOUS at 21:51

## 2024-07-31 RX ADMIN — ZOLPIDEM TARTRATE 5 MG: 5 TABLET ORAL at 21:51

## 2024-07-31 RX ADMIN — VALSARTAN 80 MG: 80 TABLET, FILM COATED ORAL at 21:51

## 2024-07-31 RX ADMIN — GADOTERIDOL 26 ML: 279.3 INJECTION, SOLUTION INTRAVENOUS at 09:15

## 2024-07-31 ASSESSMENT — PAIN SCALES - GENERAL: PAINLEVEL_OUTOF10: 0

## 2024-07-31 NOTE — CARE COORDINATION
Patient down for cardiac MRI today. To determine after testing for loop recorder vs. EP studies/ICD. Patient moving well in the room. Plans for no needs when released.     For questions I can be reached at [phone]. BRIDGER Shelton

## 2024-07-31 NOTE — PROGRESS NOTES
tablet 40 mEq  40 mEq Oral PRN Sarah Dowd APRN - CNP        Or    potassium bicarb-citric acid (EFFER-K) effervescent tablet 40 mEq  40 mEq Oral PRN Sarah Dowd APRN - CNP        Or    potassium chloride 10 mEq/100 mL IVPB (Peripheral Line)  10 mEq IntraVENous PRN Sarah Dowd APRN - CNP        magnesium sulfate 2000 mg in 50 mL IVPB premix  2,000 mg IntraVENous PRN Sarah Dowd APRN - CNP        ondansetron (ZOFRAN-ODT) disintegrating tablet 4 mg  4 mg Oral Q8H PRN Sarah Dowd APRN - CNP        Or    ondansetron (ZOFRAN) injection 4 mg  4 mg IntraVENous Q6H PRN Sarah Dowd APRN - CNP        polyethylene glycol (GLYCOLAX) packet 17 g  17 g Oral Daily PRN Sarah Dowd APRN - CNP        acetaminophen (TYLENOL) tablet 650 mg  650 mg Oral Q6H PRN Sarah Dowd APRN - CNP   650 mg at 07/29/24 2144    Or    acetaminophen (TYLENOL) suppository 650 mg  650 mg Rectal Q6H PRN Sarah Dowd APRN - CNP            Allergies   Allergen Reactions    Lipitor [Atorvastatin]      Caused my bones to ache        ROS:   Constitutional: Negative for fever, activity change and appetite change.   HENT: Negative for epistaxis.   Eyes: Negative for diploplia, blurred vision.   Respiratory: Negative for cough, chest tightness, shortness of breath and wheezing.   Cardiovascular: pertinent positives in HPI  Gastrointestinal: Negative for abdominal pain and blood in stool.   Genitourinary: Negative for hematuria and difficulty urinating.   Musculoskeletal: Negative for myalgias and gait problem.   Skin: Negative for color change and rash.   Neurological: Negative for syncope and light-headedness.   Psychiatric/Behavioral: Negative for confusion and agitation. The patient is not nervous/anxious.  Heme: no bleeding disorders, no melena or hematochezia  All other review of systems are negative     PHYSICAL EXAM:  Constitutional   Vitals:    07/30/24 0850 07/30/24 1121 07/30/24 1600 07/30/24 2000   BP:  (!) 140/64 (!) 151/61 108/71 137/60   Pulse: 64 64 63 61   Resp: 18 18 18    Temp: 97.9 °F (36.6 °C) 97.7 °F (36.5 °C) 97.9 °F (36.6 °C) 98.1 °F (36.7 °C)   TempSrc: Oral Temporal Oral Temporal   SpO2: 98% 99% 98%    Weight:       Height:        Well-developed, no acute distress, well groomed  Eyes: conjunctivae normal, no xanthelasma   Ears, Nose, Throat: oral mucosa moist, no cyanosis   Neck: supple, no JVD, no bruits, no thyromegaly   CV: normal rate, regular rhythm,  no murmurs, rubs, or gallops.  Lungs: clear to auscultation bilaterally, normal respiratory effort without used of accessory muscles, no wheezes  Abdomen: soft, non-tender, bowel sounds present, no masses or hepatomegaly   Extremities: no digital clubbing, no edema   Skin: warm, no rashes   Neuro/Psych: A&O x 3, normal mood and affect    Data:    Recent Labs     07/29/24  0436 07/30/24  0440 07/31/24  0431   WBC 7.6 6.5 5.6   HGB 11.0* 10.8* 10.3*   HCT 34.1 33.4* 31.2*    300 287     Recent Labs     07/29/24  0436 07/30/24  0440 07/31/24  0431    146 141   K 4.7 4.2 4.3    111* 109*   CO2 24 23 20*   BUN 28* 22 21   CREATININE 1.1* 0.8 0.8   CALCIUM 10.4* 10.3* 10.5*     No results for input(s): \"INR\" in the last 72 hours.  No results for input(s): \"TSH\" in the last 72 hours.    Lab Results   Component Value Date/Time    MG 2.1 07/27/2024 05:49 AM     Telemetry: Sinus at 80 bpm, no events    Assessment/plan:  Syncope/OH  - Orthostatic VS consistent with OH.  - Recommend drink at least 60 fluid ounces of water daily.   - Change positions slowly. Return to prior position if develop symptoms until symptoms resolve.  - Recommend sleep with head of bed elevated at 30 degrees.  - She is on multiple medications associated with hypotension/OH, including cilostazol, metoprolol XL, valsartan, zolpidem, and desvenlafaxamine. Recommend avoid medications with this side effect if possible. Devenlafaxamine and cilostazol discontinued.  -

## 2024-07-31 NOTE — PROGRESS NOTES
[facility]   Progress note      Subjective:  Denies chest pain and dyspnea  She feels well today  She will be undergoing MRI coronaries today after which she may require a loop recorder  No lightheadedness or dizziness reported, she indicates she feels well    /60   Pulse 61   Temp 98.1 °F (36.7 °C) (Temporal)   Resp 18   Ht 1.6 m (5' 3\")   Wt 65.3 kg (144 lb)   SpO2 98%   BMI 25.51 kg/m²     In: 360 [P.O.:360]  Out: 0   In: 360   Out: 0     General appearance: NAD, conversant  HEENT: AT/NC, MMM  Neck: FROM, supple  Lungs: Clear to auscultation  CV: RRR, no MRGs  Vasc: Radial pulses 2+  Abdomen: Soft, non-tender; no masses or HSM  Extremities: No peripheral edema or digital cyanosis.  FABIOLA hose (thigh-high) in place  Skin: no rash, lesions or ulcers  Psych: Alert and oriented to person, place and time  Neuro: Alert and interactive     Recent Labs     07/29/24  0436 07/30/24  0440 07/31/24  0431   WBC 7.6 6.5 5.6   HGB 11.0* 10.8* 10.3*   HCT 34.1 33.4* 31.2*    300 287       Recent Labs     07/29/24  0436 07/30/24  0440    146   K 4.7 4.2    111*   CO2 24 23   BUN 28* 22   CREATININE 1.1* 0.8   CALCIUM 10.4* 10.3*     07/28/2024 Lexiscan MPS:  Stress Combined Conclusion: The study is negative for myocardial ischemia. Findings suggest a low risk of cardiac events.  Stress Function: Left ventricular function post-stress is normal. Post-stress ejection fraction is 57%.  Perfusion Comments: LV perfusion is normal with attenuation artifacts.  There is no evidence of inducible ischemia.  ECG: Resting ECG demonstrates normal sinus rhythm.  Stress Test: A pharmacological stress test was performed using regadenoson (Lexiscan). PO caffeine given as a reversal agent. The patient reported nausea and no chest pain during the stress test. Symptoms began during stress and ended during recovery.  Resting ECG: The ECG shows sinus rhythm. Lateral ST/T abnormality  Stress ECG:

## 2024-08-01 ENCOUNTER — ANESTHESIA EVENT (OUTPATIENT)
Age: 79
End: 2024-08-01
Payer: MEDICARE

## 2024-08-01 ENCOUNTER — ANESTHESIA (OUTPATIENT)
Age: 79
End: 2024-08-01
Payer: MEDICARE

## 2024-08-01 PROBLEM — N17.9 AKI (ACUTE KIDNEY INJURY) (HCC): Status: ACTIVE | Noted: 2024-08-01

## 2024-08-01 LAB — ECHO BSA: 1.7 M2

## 2024-08-01 PROCEDURE — C1894 INTRO/SHEATH, NON-LASER: HCPCS | Performed by: INTERNAL MEDICINE

## 2024-08-01 PROCEDURE — C1730 CATH, EP, 19 OR FEW ELECT: HCPCS | Performed by: INTERNAL MEDICINE

## 2024-08-01 PROCEDURE — 6360000002 HC RX W HCPCS

## 2024-08-01 PROCEDURE — 93620 COMP EP EVL R AT VEN PAC&REC: CPT | Performed by: INTERNAL MEDICINE

## 2024-08-01 PROCEDURE — 2060000000 HC ICU INTERMEDIATE R&B

## 2024-08-01 PROCEDURE — 7100000011 HC PHASE II RECOVERY - ADDTL 15 MIN: Performed by: INTERNAL MEDICINE

## 2024-08-01 PROCEDURE — 7100000010 HC PHASE II RECOVERY - FIRST 15 MIN: Performed by: INTERNAL MEDICINE

## 2024-08-01 PROCEDURE — 6370000000 HC RX 637 (ALT 250 FOR IP): Performed by: NURSE PRACTITIONER

## 2024-08-01 PROCEDURE — 2580000003 HC RX 258: Performed by: NURSE PRACTITIONER

## 2024-08-01 PROCEDURE — 4A0234Z MEASUREMENT OF CARDIAC ELECTRICAL ACTIVITY, PERCUTANEOUS APPROACH: ICD-10-PCS | Performed by: INTERNAL MEDICINE

## 2024-08-01 PROCEDURE — 33285 INSJ SUBQ CAR RHYTHM MNTR: CPT | Performed by: INTERNAL MEDICINE

## 2024-08-01 PROCEDURE — C1764 EVENT RECORDER, CARDIAC: HCPCS | Performed by: INTERNAL MEDICINE

## 2024-08-01 PROCEDURE — 2709999900 HC NON-CHARGEABLE SUPPLY: Performed by: INTERNAL MEDICINE

## 2024-08-01 PROCEDURE — 4A023FZ MEASUREMENT OF CARDIAC RHYTHM, PERCUTANEOUS APPROACH: ICD-10-PCS | Performed by: INTERNAL MEDICINE

## 2024-08-01 PROCEDURE — 3700000001 HC ADD 15 MINUTES (ANESTHESIA): Performed by: INTERNAL MEDICINE

## 2024-08-01 PROCEDURE — 3700000000 HC ANESTHESIA ATTENDED CARE: Performed by: INTERNAL MEDICINE

## 2024-08-01 PROCEDURE — 93623 PRGRMD STIMJ&PACG IV RX NFS: CPT | Performed by: INTERNAL MEDICINE

## 2024-08-01 PROCEDURE — 2580000003 HC RX 258

## 2024-08-01 PROCEDURE — 6370000000 HC RX 637 (ALT 250 FOR IP): Performed by: INTERNAL MEDICINE

## 2024-08-01 PROCEDURE — 2500000003 HC RX 250 WO HCPCS: Performed by: INTERNAL MEDICINE

## 2024-08-01 PROCEDURE — 0JH632Z INSERTION OF MONITORING DEVICE INTO CHEST SUBCUTANEOUS TISSUE AND FASCIA, PERCUTANEOUS APPROACH: ICD-10-PCS | Performed by: INTERNAL MEDICINE

## 2024-08-01 DEVICE — 3+ INSERTABLE CARDIAC MONITOR
Type: IMPLANTABLE DEVICE | Status: FUNCTIONAL
Brand: ASSERT-IQ™

## 2024-08-01 RX ORDER — MIDAZOLAM HYDROCHLORIDE 1 MG/ML
INJECTION INTRAMUSCULAR; INTRAVENOUS PRN
Status: DISCONTINUED | OUTPATIENT
Start: 2024-08-01 | End: 2024-08-01 | Stop reason: SDUPTHER

## 2024-08-01 RX ORDER — CEFAZOLIN SODIUM 1 G/3ML
INJECTION, POWDER, FOR SOLUTION INTRAMUSCULAR; INTRAVENOUS PRN
Status: DISCONTINUED | OUTPATIENT
Start: 2024-08-01 | End: 2024-08-01 | Stop reason: SDUPTHER

## 2024-08-01 RX ORDER — SODIUM CHLORIDE 0.9 % (FLUSH) 0.9 %
5-40 SYRINGE (ML) INJECTION EVERY 12 HOURS SCHEDULED
Status: CANCELLED | OUTPATIENT
Start: 2024-08-01

## 2024-08-01 RX ORDER — FENTANYL CITRATE 50 UG/ML
INJECTION, SOLUTION INTRAMUSCULAR; INTRAVENOUS PRN
Status: DISCONTINUED | OUTPATIENT
Start: 2024-08-01 | End: 2024-08-01 | Stop reason: SDUPTHER

## 2024-08-01 RX ORDER — SODIUM CHLORIDE 0.9 % (FLUSH) 0.9 %
5-40 SYRINGE (ML) INJECTION PRN
Status: CANCELLED | OUTPATIENT
Start: 2024-08-01

## 2024-08-01 RX ORDER — SODIUM CHLORIDE 9 MG/ML
INJECTION, SOLUTION INTRAVENOUS CONTINUOUS PRN
Status: DISCONTINUED | OUTPATIENT
Start: 2024-08-01 | End: 2024-08-01 | Stop reason: SDUPTHER

## 2024-08-01 RX ORDER — PROPOFOL 10 MG/ML
INJECTION, EMULSION INTRAVENOUS CONTINUOUS PRN
Status: DISCONTINUED | OUTPATIENT
Start: 2024-08-01 | End: 2024-08-01 | Stop reason: SDUPTHER

## 2024-08-01 RX ORDER — LIDOCAINE HYDROCHLORIDE 10 MG/ML
INJECTION, SOLUTION INFILTRATION; PERINEURAL PRN
Status: DISCONTINUED | OUTPATIENT
Start: 2024-08-01 | End: 2024-08-01 | Stop reason: HOSPADM

## 2024-08-01 RX ADMIN — MIDAZOLAM 0.5 MG: 1 INJECTION INTRAMUSCULAR; INTRAVENOUS at 12:53

## 2024-08-01 RX ADMIN — CEFAZOLIN 2 G: 1 INJECTION, POWDER, FOR SOLUTION INTRAMUSCULAR; INTRAVENOUS at 14:12

## 2024-08-01 RX ADMIN — PROPOFOL 20 MG: 10 INJECTION, EMULSION INTRAVENOUS at 12:57

## 2024-08-01 RX ADMIN — VALSARTAN 80 MG: 80 TABLET, FILM COATED ORAL at 21:08

## 2024-08-01 RX ADMIN — PROPOFOL 20 MG: 10 INJECTION, EMULSION INTRAVENOUS at 14:46

## 2024-08-01 RX ADMIN — ACETAMINOPHEN 650 MG: 325 TABLET ORAL at 17:40

## 2024-08-01 RX ADMIN — SODIUM CHLORIDE: 9 INJECTION, SOLUTION INTRAVENOUS at 12:40

## 2024-08-01 RX ADMIN — FENTANYL CITRATE 20 MCG: 50 INJECTION, SOLUTION INTRAMUSCULAR; INTRAVENOUS at 12:56

## 2024-08-01 RX ADMIN — ZOLPIDEM TARTRATE 5 MG: 5 TABLET ORAL at 21:08

## 2024-08-01 RX ADMIN — SODIUM CHLORIDE, PRESERVATIVE FREE 10 ML: 5 INJECTION INTRAVENOUS at 21:08

## 2024-08-01 RX ADMIN — SODIUM CHLORIDE, PRESERVATIVE FREE 5 ML: 5 INJECTION INTRAVENOUS at 09:27

## 2024-08-01 RX ADMIN — PROPOFOL 10 MCG/KG/MIN: 10 INJECTION, EMULSION INTRAVENOUS at 12:59

## 2024-08-01 ASSESSMENT — PAIN SCALES - GENERAL
PAINLEVEL_OUTOF10: 1
PAINLEVEL_OUTOF10: 0

## 2024-08-01 ASSESSMENT — LIFESTYLE VARIABLES: SMOKING_STATUS: 0

## 2024-08-01 NOTE — PLAN OF CARE
Problem: Discharge Planning  Goal: Discharge to home or other facility with appropriate resources  8/1/2024 0550 by Rosario Roberts RN  Outcome: Progressing  8/1/2024 0550 by Rosario Roberts RN  Outcome: Progressing     Problem: ABCDS Injury Assessment  Goal: Absence of physical injury  Outcome: Progressing     Problem: Safety - Adult  Goal: Free from fall injury  8/1/2024 0550 by Rosario Roberts RN  Outcome: Progressing  8/1/2024 0550 by Rosario Roberts RN  Outcome: Progressing     Problem: Cardiovascular - Adult  Goal: Maintains optimal cardiac output and hemodynamic stability  Outcome: Progressing  Goal: Absence of cardiac dysrhythmias or at baseline  Outcome: Progressing

## 2024-08-01 NOTE — PROGRESS NOTES
Comprehensive Nutrition Assessment    Type and Reason for Visit:  Initial, RD Nutrition Re-Screen/LOS    Nutrition Recommendations/Plan:   Continue NPO, ADAT once med appropriate.    Will Start ONS once diet adv and monitor.      Malnutrition Assessment:  Malnutrition Status:  At risk for malnutrition (Comment) (08/01/24 7170)    Context:  Acute Illness     Findings of the 6 clinical characteristics of malnutrition:  Energy Intake:  50% or less of estimated energy requirements for 5 or more days  Weight Loss:  Unable to assess (2/2 fluid shifts w/ CHF hx)     Body Fat Loss:  Unable to assess (2/2 pt CARLTON for EP study at this time)     Muscle Mass Loss:  Unable to assess    Fluid Accumulation:  No significant fluid accumulation     Strength:  Not Performed    Nutrition Assessment:    Pt adm d/t syncopal episode while sitting w/ N/V/D post syncope.  PMHx CAD/NSTEMI s/p CABG x 3 (23'), CHF, HTN, HLD, ARJUN.  Adm w/ syncope likely 2/2 OH; for EP study today d/t concerns for possible cardiomyopathy.  At risk d/t noted decreased appetite/intake since adm.  Will Start ONS once diet advanced and monitor.    Nutrition Related Findings:    A&O, dentition WNL, Abd/BS WDL, no edema, +I/O's, elevated Chl/Ca/BGL, noted A1c 6.1%, possible pre-DM? Wound Type: None       Current Nutrition Intake & Therapies:    Average Meal Intake: NPO, 26-50%  Average Supplements Intake: None Ordered  Diet NPO    Anthropometric Measures:  Height: 160 cm (5' 3\")  Ideal Body Weight (IBW): 115 lbs (52 kg)    Admission Body Weight: 65.3 kg (144 lb) (unknown method 7/29)  Current Body Weight: 65.3 kg (144 lb) (unknown method 7/29; UTO CBW d/t pt CARLTON at time of attempted visit),   IBW. Weight Source: Bed Scale  Current BMI (kg/m2): 25.5  Usual Body Weight: 73.9 kg (163 lb) (actual 11/9/23 per EMR; SAI wt change properly d/t limited EMR wt hx as well as fluid shifts w/ CHF)  % Weight Change (Calculated): -11.7  Weight Adjustment For: No Adjustment       done

## 2024-08-01 NOTE — ANESTHESIA PRE PROCEDURE
Department of Anesthesiology  Preprocedure Note       Name:  Betsy Monroy   Age:  79 y.o.  :  1945                                          MRN:  [id number]         Date:  2024      Surgeon: Surgeon(s):  Cecy Degroot MD    Procedure: Procedure(s):  Ep study complete    Medications prior to admission:   Prior to Admission medications    Medication Sig Start Date End Date Taking? Authorizing Provider   simvastatin (ZOCOR) 40 MG tablet Take 1 tablet by mouth nightly   Yes Guicho Nava MD   valsartan (DIOVAN) 80 MG tablet TAKE 1 TABLET DAILY 24   Rahel Bonds DO   ELIQUIS 5 MG TABS tablet TAKE 1 TABLET TWICE A DAY 24   Rahel Bonds DO   cilostazol (PLETAL) 100 MG tablet Take 1 tablet by mouth in the morning and at bedtime 10/4/23   Guicho Nava MD   metoprolol succinate (TOPROL XL) 25 MG extended release tablet Take 1 tablet by mouth in the morning and at bedtime 3/27/23   Anupam Vazquez MD   zolpidem (AMBIEN CR) 6.25 MG extended release tablet Take 2 tablets by mouth nightly as needed. 23   Guicho Nava MD   aspirin 81 MG EC tablet Take 1 tablet by mouth daily 23   Madison Sellers APRN - CNP       Current medications:    Current Facility-Administered Medications   Medication Dose Route Frequency Provider Last Rate Last Admin   • valsartan (DIOVAN) tablet 80 mg  80 mg Oral QHS Socorro Vela APRN - CNP   80 mg at 24   • melatonin disintegrating tablet 5 mg  5 mg Oral Nightly Silvana Donaldson APRN - CNP       • zolpidem (AMBIEN) tablet 5 mg  5 mg Oral Nightly PRN Roseline Bates MD   5 mg at 24   • docusate sodium (COLACE) capsule 100 mg  100 mg Oral Daily Cuca Russell APRN - CNP   100 mg at 24 1800   • perflutren lipid microspheres (DEFINITY) injection 1.5 mL  1.5 mL IntraVENous ONCE PRN Dallas Gonzalez DO       • [Held by provider] metoprolol succinate (TOPROL XL) extended release tablet 50 mg  50 mg Oral BID Carlos

## 2024-08-01 NOTE — PROGRESS NOTES
[facility]   Progress note      Subjective:  Denies chest pain and dyspnea  She feels well today  No lightheadedness or dizziness reported, she indicates she feels well    BP (!) 151/88   Pulse 66   Temp 98 °F (36.7 °C) (Temporal)   Resp 18   Ht 1.6 m (5' 3\")   Wt 65.3 kg (144 lb)   SpO2 98%   BMI 25.51 kg/m²     No intake/output data recorded.  No intake/output data recorded.    General appearance: NAD, conversant  HEENT: AT/NC, MMM  Neck: FROM, supple  Lungs: Clear to auscultation  CV: RRR, no MRGs  Vasc: Radial pulses 2+  Abdomen: Soft, non-tender; no masses or HSM  Extremities: No peripheral edema or digital cyanosis.    Skin: no rash, lesions or ulcers  Psych: Alert and oriented to person, place and time  Neuro: Alert and interactive     Recent Labs     07/30/24  0440 07/31/24  0431   WBC 6.5 5.6   HGB 10.8* 10.3*   HCT 33.4* 31.2*    287       Recent Labs     07/30/24  0440 07/31/24  0431    141   K 4.2 4.3   * 109*   CO2 23 20*   BUN 22 21   CREATININE 0.8 0.8   CALCIUM 10.3* 10.5*     07/28/2024 Lexiscan MPS:  Stress Combined Conclusion: The study is negative for myocardial ischemia. Findings suggest a low risk of cardiac events.  Stress Function: Left ventricular function post-stress is normal. Post-stress ejection fraction is 57%.  Perfusion Comments: LV perfusion is normal with attenuation artifacts.  There is no evidence of inducible ischemia.  ECG: Resting ECG demonstrates normal sinus rhythm.  Stress Test: A pharmacological stress test was performed using regadenoson (Lexiscan). PO caffeine given as a reversal agent. The patient reported nausea and no chest pain during the stress test. Symptoms began during stress and ended during recovery.  Resting ECG: The ECG shows sinus rhythm. Lateral ST/T abnormality  Stress ECG: Inferior and lateral ST/T abnormality (about 1mm ST depresson)    07/29/2024 TTE (Dr. Gonzalez):  Left Ventricle: Normal left ventricular  systolic function. EF by visual approximation is 65%. Left ventricle size is normal. Normal wall thickness. Normal wall motion. Grade I diastolic dysfunction with normal LAP.  Mitral Valve: Mild regurgitation.  Tricuspid Valve: Mild regurgitation. The estimated RVSP is 23 mmHg.         Assessment/Plan:  Patient is a 79-year-old female with a hx of Afib on eliquis, HTN and CAD s/p CABG x 3 (LIMA-LAD, CryoVein-OM, CryoVein-RCA)/MYNOR exclusion with 35mm AtriClip on 1/2/23 who is admitted to [facility] for  Near syncope-questionable syncope  -Sounds to be more of a vasovagal episode associated with nausea vomiting and loss of bowel function-no associated chest pain or shortness of breath  -Notably recently initiated on Pristiq which she feels did not sit well with her as she had queasiness and nausea the day before when she took it as well  -Monitor labs  -23/1.1 18/0.9>   -Continue IVF NS at 75  -Check orthostatic Bps  -Troponin 20-20  -Echo 1/6/23 > EF 45 to 50%, mild MR with moderate TR  -Continue to monitor vital signs  -Given her strong cardiac history and currently wearing a Holter monitor at home-cardiology consultation to be obtained to ensure no cardiac related syncope  -If no further plans by cardiology-she may be discharged home     Atrial fibrillation  -Continue home metoprolol XL 25 mg twice daily for rate control  -Continue Eliquis     Hypertension  -Continue home medications with parameters  -Continue to monitor Bps    7/27/2024  I had discharged the patient yesterday however it appears discharge was held  Cardiology saw her today and would like to do a stress echo and EP consultation  Symptoms sound to be vasovagal but she does have a strong cardiac history-will defer further plans to EP/cardiology  She is agreeable to stay now-she was quite anxious for discharge yesterday if she was not going to be evaluated by cardiology  Blood work unremarkable  Vital signs stable  No further episodes of near

## 2024-08-02 VITALS
HEART RATE: 75 BPM | TEMPERATURE: 98 F | SYSTOLIC BLOOD PRESSURE: 119 MMHG | WEIGHT: 144 LBS | HEIGHT: 63 IN | RESPIRATION RATE: 18 BRPM | BODY MASS INDEX: 25.52 KG/M2 | DIASTOLIC BLOOD PRESSURE: 73 MMHG | OXYGEN SATURATION: 98 %

## 2024-08-02 PROCEDURE — 99233 SBSQ HOSP IP/OBS HIGH 50: CPT | Performed by: INTERNAL MEDICINE

## 2024-08-02 PROCEDURE — 6370000000 HC RX 637 (ALT 250 FOR IP): Performed by: NURSE PRACTITIONER

## 2024-08-02 PROCEDURE — 2580000003 HC RX 258: Performed by: NURSE PRACTITIONER

## 2024-08-02 RX ADMIN — SODIUM CHLORIDE, PRESERVATIVE FREE 10 ML: 5 INJECTION INTRAVENOUS at 09:09

## 2024-08-02 RX ADMIN — ATORVASTATIN CALCIUM 20 MG: 20 TABLET, FILM COATED ORAL at 09:08

## 2024-08-02 RX ADMIN — ACETAMINOPHEN 650 MG: 325 TABLET ORAL at 11:24

## 2024-08-02 RX ADMIN — ASPIRIN 81 MG: 81 TABLET, COATED ORAL at 09:08

## 2024-08-02 ASSESSMENT — PAIN DESCRIPTION - LOCATION: LOCATION: HEAD

## 2024-08-02 ASSESSMENT — PAIN SCALES - GENERAL
PAINLEVEL_OUTOF10: 0
PAINLEVEL_OUTOF10: 5

## 2024-08-02 ASSESSMENT — PAIN DESCRIPTION - ORIENTATION: ORIENTATION: MID

## 2024-08-02 ASSESSMENT — PAIN DESCRIPTION - DESCRIPTORS: DESCRIPTORS: ACHING;DISCOMFORT;SORE

## 2024-08-02 NOTE — PLAN OF CARE
Problem: Discharge Planning  Goal: Discharge to home or other facility with appropriate resources  8/2/2024 1324 by Ragini Lopez RN  Outcome: Adequate for Discharge  8/2/2024 1020 by Ragini Lopez RN  Outcome: Progressing     Problem: ABCDS Injury Assessment  Goal: Absence of physical injury  8/2/2024 1324 by Ragini Lopez RN  Outcome: Adequate for Discharge  8/2/2024 1020 by Ragini Lopez RN  Outcome: Progressing     Problem: Safety - Adult  Goal: Free from fall injury  Outcome: Adequate for Discharge     Problem: Pain  Goal: Verbalizes/displays adequate comfort level or baseline comfort level  Outcome: Adequate for Discharge     Problem: Cardiovascular - Adult  Goal: Maintains optimal cardiac output and hemodynamic stability  Outcome: Adequate for Discharge  Goal: Absence of cardiac dysrhythmias or at baseline  Outcome: Adequate for Discharge     Problem: Nutrition Deficit:  Goal: Optimize nutritional status  Outcome: Adequate for Discharge

## 2024-08-02 NOTE — PROGRESS NOTES
[facility] CARDIOLOGY  CARDIAC ELECTROPHYSIOLOGY DEPARTMENT/DIVISION OF CARDIOLOGY  Inpatient Progress  Report  PATIENT: Betsy Monroy  MEDICAL RECORD NUMBER: [id number]  DATE OF SERVICE:  2024  PRIMARY ELECTROPHYSIOLOGIST: Vladislav Shepard DO   ATTENDING ELECTROPHYSIOLOGIST:  Clementina Allen MD   REFERRING PHYSICIAN: Nunu Rivera DO  CHIEF COMPLAINT: syncope    HPI:  is a 79 y.o. female with a history of prophylactic 35 mmAtriClip (1/3/23- Dr Lawrence), CAD sp CABG x 3 (1/3/23: LIMA-LAD,SVG-OM, SVG-RCA), HFmrEF, syncope, HTN, GERD/hiatal hernia.. She is managed by Dr Vazquez with aspirin 81 mg daily, apixaban 5 mg BID, cilostazol 100 mg BID, metoprolol XL 25 mg BID, simvastatin 40 mg daily, and valsartan 80 mg daily. In 2022, she was diagnosed with HFrEF-ischemic based on TTE and LHC. She was treated with CABG x 3. Additionally, prophylactic MYNOR AtriClip performed. No prior history of AF/AFL, but did have POAF after CABG/AtriClip. In -2024, she reportedly had a syncopal event while seated at the kitchen table. On , she presented with chief complaint of LOC. LOC occurred after position change from seated to standing position at hair appointment. She had fecal incontinence and N/V. Cardiology was consulted and recommended EP eval. Orthostatic VS performed multiple times and consistent with OH, including most recently (seated: 160/70 mMHg, 65 bpm; standin/70 mmHg, 75 bpm). EP service is now consulted for further evaluation/management of syncope.  Patient describes syncopal event in 2024 as described above with sudden LOC in a seated position.  She describes syncopal event that led to this admission as standing from seated position with sudden LOC and awakening on the floor.  She denies any other complaints at this time.    2024:She has had no recurrent NSVT and is maintaining sinus with rates in the 50's her LVEF was 65% today per TTE. She notes that the orthostatic

## 2024-08-02 NOTE — ANESTHESIA POSTPROCEDURE EVALUATION
Department of Anesthesiology  Postprocedure Note    Patient: Betsy Monroy  MRN: [id number]  YOB: 1945  Date of evaluation: 8/2/2024    Procedure Summary       Date: 08/01/24 Room / [facility]: [facility] CATH [facility]    Anesthesia Start: 1240 Anesthesia Stop: 1456    Procedures:       Ep study complete      Loop recorder insert Diagnosis:       Ischemic heart disease      (Syncope and collapse  Ischemic heart disease)    Providers: Cecy Degroot MD Responsible Provider: Stephani Murillo MD    Anesthesia Type: MAC ASA Status: 4            Anesthesia Type: MAC    Shadi Phase I:      Shadi Phase II:      Anesthesia Post Evaluation    Patient location during evaluation: PACU  Patient participation: complete - patient participated  Level of consciousness: awake and alert  Airway patency: patent  Nausea & Vomiting: no nausea and no vomiting  Cardiovascular status: blood pressure returned to baseline and hemodynamically stable  Respiratory status: acceptable and spontaneous ventilation  Hydration status: euvolemic  Multimodal analgesia pain management approach  Pain management: adequate    There were no known notable events for this encounter.

## 2024-08-02 NOTE — CARE COORDINATION
EP cleared for discharge. Plan is home today with no needs.     For questions I can be reached at [phone]. BRIDGER Shelton

## 2024-08-02 NOTE — DISCHARGE SUMMARY
[facility]   Discharge summary   Patient ID:  Betsy Monroy  [id number]  79 y.o.  1945    Admit date: 7/25/2024    Discharge date and time: 8/2/2024    Admission Diagnoses:   Patient Active Problem List   Diagnosis    Bilateral carotid artery stenosis    NSTEMI (non-ST elevated myocardial infarction) ([facility])    Acute heart failure ([facility])    Systolic murmur    EKG, abnormal    Abnormal nuclear stress test    Primary hypertension    Hyperlipidemia    Carotid artery disease ([facility])    CAD (coronary artery disease)    Acute pulmonary insufficiency    Acute postoperative pain    Postoperative atrial fibrillation ([facility])    Syncope and collapse    Orthostatic hypotension    DIANNA (acute kidney injury) ([facility])       Discharge Diagnoses: syncope     Consults: cardiology and EP    Procedures: linq recorder    Hospital Course: The patient is a 79 y.o. female of Nunu Rivera DO     Patient is a 79-year-old female with a hx of Afib on eliquis, HTN and CAD s/p CABG x 3 (LIMA-LAD, CryoVein-OM, CryoVein-RCA)/MYNOR exclusion with 35mm AtriClip on 1/2/23 who is admitted to [facility] for  Near syncope-questionable syncope  -Sounds to be more of a vasovagal episode associated with nausea vomiting and loss of bowel function-no associated chest pain or shortness of breath  -Notably recently initiated on Pristiq which she feels did not sit well with her as she had queasiness and nausea the day before when she took it as well  -Monitor labs  -23/1.1 18/0.9>   -Continue IVF NS at 75  -Check orthostatic Bps  -Troponin 20-20  -Echo 1/6/23 > EF 45 to 50%, mild MR with moderate TR  -Continue to monitor vital signs  -Given her strong cardiac history and currently wearing a Holter monitor at home-cardiology consultation to be obtained to ensure no cardiac related syncope  -If no further plans by cardiology-she may be discharged home     Atrial fibrillation  -Continue home metoprolol XL 25 mg twice daily for rate control  -Continue

## 2024-08-02 NOTE — NURSE NAVIGATOR
EP Navigator Education:    I met with Tiffany to review her d/c instructions and care of her loop recorder. We reviewed incision care and provided her with her fu appt that is scheduled for Thursday 8/15/24 at 10:00 in [place]. I also gave her educational handouts on Loop Recorder insertion as well as syncope. She was thankful for the visit.  I provided my contact info should she want to call me for any questions.     I spent 15 minutes with Tiffany.

## 2024-08-05 ENCOUNTER — TELEPHONE (OUTPATIENT)
Dept: CARDIOLOGY CLINIC | Age: 79
End: 2024-08-05

## 2024-08-08 ENCOUNTER — TELEPHONE (OUTPATIENT)
Age: 79
End: 2024-08-08

## 2024-08-08 NOTE — TELEPHONE ENCOUNTER
I called Tiffany to see how she's doing since her Loop insertion on 8/1/24.  She states she's doing well. Denies any bleeding, drainage, or swelling from insertion site. Aquacel dressing was removed today by Dr. Rivera and steri strips remains intact. I reminded her of [facility] at 10:00 am.  She offers no complaints and is thankful for the call.

## 2024-08-15 ENCOUNTER — NURSE ONLY (OUTPATIENT)
Dept: NON INVASIVE DIAGNOSTICS | Age: 79
End: 2024-08-15

## 2024-08-15 DIAGNOSIS — Z95.818 IMPLANTABLE LOOP RECORDER PRESENT: ICD-10-CM

## 2024-08-15 DIAGNOSIS — R55 SYNCOPE AND COLLAPSE: Primary | ICD-10-CM

## 2024-08-15 NOTE — PATIENT INSTRUCTIONS
You may shower starting now    Call if any signs or symptoms of infection [phone] ext: [phone]  Fevers, chills, redness, swelling or drainage.       Hook up home  Monitor    Shad ( [facility]) Merlin support ( home monitoring) [phone]

## 2024-08-15 NOTE — PROGRESS NOTES
See Murj report.    Hiwot Benavides RN, BSN  [facility] [place]   [facility]

## 2024-10-05 PROCEDURE — 93298 REM INTERROG DEV EVAL SCRMS: CPT | Performed by: STUDENT IN AN ORGANIZED HEALTH CARE EDUCATION/TRAINING PROGRAM

## 2024-11-18 ENCOUNTER — OFFICE VISIT (OUTPATIENT)
Dept: CARDIOLOGY CLINIC | Age: 79
End: 2024-11-18

## 2024-11-18 VITALS
SYSTOLIC BLOOD PRESSURE: 138 MMHG | HEIGHT: 63 IN | DIASTOLIC BLOOD PRESSURE: 74 MMHG | WEIGHT: 144 LBS | BODY MASS INDEX: 25.52 KG/M2 | RESPIRATION RATE: 16 BRPM | HEART RATE: 64 BPM | TEMPERATURE: 97.3 F

## 2024-11-18 DIAGNOSIS — I10 PRIMARY HYPERTENSION: ICD-10-CM

## 2024-11-18 DIAGNOSIS — I25.810 CORONARY ARTERY DISEASE INVOLVING CORONARY BYPASS GRAFT OF NATIVE HEART WITHOUT ANGINA PECTORIS: Primary | ICD-10-CM

## 2024-11-18 DIAGNOSIS — I48.0 PAF (PAROXYSMAL ATRIAL FIBRILLATION) (HCC): ICD-10-CM

## 2024-11-18 DIAGNOSIS — I50.22 CHRONIC HFREF (HEART FAILURE WITH REDUCED EJECTION FRACTION) (HCC): ICD-10-CM

## 2024-11-18 DIAGNOSIS — I25.5 ISCHEMIC CARDIOMYOPATHY: ICD-10-CM

## 2024-11-18 DIAGNOSIS — R55 SYNCOPE AND COLLAPSE: ICD-10-CM

## 2024-11-18 NOTE — PROGRESS NOTES
OUTPATIENT CARDIOLOGY FOLLOW-UP    Name: Betsy Monroy    Age: 79 y.o.    Date of Service: 11/18/2024    Chief Complaint: Follow-up for CAD s/p CABG, ischemic CM, HFrEF, PAF    Referring Physician: Nunu Rivera DO    History of Present Illness:  She was previously a part time  at [employer] (retired). At the time of her prior office visit, she reported a > 1 year history of progressive fatigue, which she attributed to increased stress at home -- (18 year old granddaughter living with her, her  previously had prostate CA and lung CA) --> her  passed away in 5/2019, granddaughter initially went to college in [place] and then [facility] (then on academic suspension --> worked at [employer] --> recently moved to [place]).    At the time of her 12/2022 office visit, she reported a ~ 7 month history of WALKER with moderate levels of exertion and exertional chest pain (\"discomfort\", mid-sternal, more frequent episodes recently). She denied a history of palpitations, syncope, PND, or orthopnea. Outpatient cardiac catheterization was scheduled --> patient suffered an MI prior to scheduled cath date --> inpatient cardiac catheterization demonstrated multivessel CAD.    Multivessel CAD/NSTEMI in 12/2022 s/p CABG x 3 (LIMA-LAD, CryoVein-OM, CryoVein-RCA) and MYNOR exclusion with 35mm AtriClip on 1/3/23.    ++increased stress recently (patient tearful at 7/2024 office visit) --> her son (age 53) experienced a CVA in 3/2024 (still with focal motor deficits and he is currently in a nursing home) and she has been his primary caregiver -- slow clinical improvement per patient. +MSK chest pain. +recent fatigue and intermittent SOB (in the setting of significant stress). No recent WALKER. No new cardiac complaints otherwise. She's been increasing her activity level / she recently purchased a treadmill (although she's not been using it). SR on EKG.    Syncope (7/2024, +orthostatic vital signs) -- no further

## 2024-12-05 ENCOUNTER — HOSPITAL ENCOUNTER (OUTPATIENT)
Dept: ULTRASOUND IMAGING | Age: 79
Discharge: HOME | End: 2024-12-07
Payer: MEDICARE

## 2024-12-05 ENCOUNTER — HOSPITAL ENCOUNTER (OUTPATIENT)
Dept: GENERAL RADIOLOGY | Age: 79
Discharge: HOME | End: 2024-12-07
Payer: MEDICARE

## 2024-12-05 ENCOUNTER — HOSPITAL ENCOUNTER (OUTPATIENT)
Age: 79
Discharge: HOME | End: 2024-12-07
Payer: MEDICARE

## 2024-12-05 DIAGNOSIS — M25.561 ARTHRALGIA OF KNEE, RIGHT: ICD-10-CM

## 2024-12-05 DIAGNOSIS — M79.662 BILATERAL CALF PAIN: ICD-10-CM

## 2024-12-05 DIAGNOSIS — M79.661 BILATERAL CALF PAIN: ICD-10-CM

## 2024-12-05 PROCEDURE — 93971 EXTREMITY STUDY: CPT

## 2024-12-05 PROCEDURE — 73564 X-RAY EXAM KNEE 4 OR MORE: CPT

## 2024-12-18 PROCEDURE — 93298 REM INTERROG DEV EVAL SCRMS: CPT | Performed by: STUDENT IN AN ORGANIZED HEALTH CARE EDUCATION/TRAINING PROGRAM

## 2025-01-03 ENCOUNTER — HOSPITAL ENCOUNTER (OUTPATIENT)
Dept: MRI IMAGING | Age: 80
Discharge: HOME | End: 2025-01-03
Payer: MEDICARE

## 2025-01-03 DIAGNOSIS — M25.561 PAIN IN JOINT OF RIGHT KNEE: ICD-10-CM

## 2025-01-03 DIAGNOSIS — R60.0 LOCALIZED EDEMA: ICD-10-CM

## 2025-01-03 PROCEDURE — 73721 MRI JNT OF LWR EXTRE W/O DYE: CPT

## 2025-03-17 RX ORDER — APIXABAN 5 MG/1
5 TABLET, FILM COATED ORAL 2 TIMES DAILY
Qty: 180 TABLET | Refills: 3 | Status: SHIPPED | OUTPATIENT
Start: 2025-03-17

## 2025-06-22 PROCEDURE — 93298 REM INTERROG DEV EVAL SCRMS: CPT | Performed by: STUDENT IN AN ORGANIZED HEALTH CARE EDUCATION/TRAINING PROGRAM

## 2025-07-23 PROCEDURE — 93298 REM INTERROG DEV EVAL SCRMS: CPT | Performed by: STUDENT IN AN ORGANIZED HEALTH CARE EDUCATION/TRAINING PROGRAM

## 2025-08-05 ENCOUNTER — HOSPITAL ENCOUNTER (OUTPATIENT)
Dept: MAMMOGRAPHY | Age: 80
Discharge: HOME | End: 2025-08-07
Payer: MEDICARE

## 2025-08-05 VITALS — HEIGHT: 64 IN | WEIGHT: 142 LBS | BODY MASS INDEX: 24.24 KG/M2

## 2025-08-05 DIAGNOSIS — Z12.31 ENCOUNTER FOR SCREENING MAMMOGRAM FOR MALIGNANT NEOPLASM OF BREAST: ICD-10-CM

## 2025-08-05 PROCEDURE — 77063 BREAST TOMOSYNTHESIS BI: CPT

## 2025-08-21 PROBLEM — Z95.818 IMPLANTABLE LOOP RECORDER PRESENT: Status: ACTIVE | Noted: 2025-08-21

## 2025-08-23 PROCEDURE — 93298 REM INTERROG DEV EVAL SCRMS: CPT | Performed by: STUDENT IN AN ORGANIZED HEALTH CARE EDUCATION/TRAINING PROGRAM

## 2025-08-26 ENCOUNTER — OFFICE VISIT (OUTPATIENT)
Dept: NON INVASIVE DIAGNOSTICS | Age: 80
End: 2025-08-26
Payer: MEDICARE

## 2025-08-26 VITALS
RESPIRATION RATE: 18 BRPM | OXYGEN SATURATION: 98 % | SYSTOLIC BLOOD PRESSURE: 120 MMHG | WEIGHT: 147 LBS | HEIGHT: 62 IN | TEMPERATURE: 99.3 F | HEART RATE: 62 BPM | DIASTOLIC BLOOD PRESSURE: 72 MMHG | BODY MASS INDEX: 27.05 KG/M2

## 2025-08-26 DIAGNOSIS — R55 SYNCOPE AND COLLAPSE: ICD-10-CM

## 2025-08-26 DIAGNOSIS — I95.1 ORTHOSTATIC HYPOTENSION: Primary | ICD-10-CM

## 2025-08-26 DIAGNOSIS — Z95.818 IMPLANTABLE LOOP RECORDER PRESENT: ICD-10-CM

## 2025-08-26 PROCEDURE — 3074F SYST BP LT 130 MM HG: CPT | Performed by: NURSE PRACTITIONER

## 2025-08-26 PROCEDURE — 1159F MED LIST DOCD IN RCRD: CPT | Performed by: NURSE PRACTITIONER

## 2025-08-26 PROCEDURE — 1160F RVW MEDS BY RX/DR IN RCRD: CPT | Performed by: NURSE PRACTITIONER

## 2025-08-26 PROCEDURE — 99214 OFFICE O/P EST MOD 30 MIN: CPT | Performed by: NURSE PRACTITIONER

## 2025-08-26 PROCEDURE — G8427 DOCREV CUR MEDS BY ELIG CLIN: HCPCS | Performed by: NURSE PRACTITIONER

## 2025-08-26 PROCEDURE — 93000 ELECTROCARDIOGRAM COMPLETE: CPT | Performed by: STUDENT IN AN ORGANIZED HEALTH CARE EDUCATION/TRAINING PROGRAM

## 2025-08-26 PROCEDURE — 1090F PRES/ABSN URINE INCON ASSESS: CPT | Performed by: NURSE PRACTITIONER

## 2025-08-26 PROCEDURE — G8417 CALC BMI ABV UP PARAM F/U: HCPCS | Performed by: NURSE PRACTITIONER

## 2025-08-26 PROCEDURE — 1036F TOBACCO NON-USER: CPT | Performed by: NURSE PRACTITIONER

## 2025-08-26 PROCEDURE — G8400 PT W/DXA NO RESULTS DOC: HCPCS | Performed by: NURSE PRACTITIONER

## 2025-08-26 PROCEDURE — 1123F ACP DISCUSS/DSCN MKR DOCD: CPT | Performed by: NURSE PRACTITIONER

## 2025-08-26 PROCEDURE — 3078F DIAST BP <80 MM HG: CPT | Performed by: NURSE PRACTITIONER

## (undated) DEVICE — DOUBLE BASIN SET: Brand: MEDLINE INDUSTRIES, INC.

## (undated) DEVICE — STERILE LATEX POWDER FREE SURGICAL GLOVES WITH HYDROGEL COATING: Brand: PROTEXIS

## (undated) DEVICE — ELECTRODE PT RET AD L9FT HI MOIST COND ADH HYDRGEL CORDED

## (undated) DEVICE — SOLUTION IV 1000ML 140MEQ/L SOD 5MEQ/L K 3MEQ/L MG 27MEQ/L

## (undated) DEVICE — INSUFFLATION TUBING SET WITH FILTER, FUNNEL CONNECTOR AND LUER LOCK: Brand: JOSNOE MEDICAL INC

## (undated) DEVICE — TUBING, SUCTION, 3/16" X 12', STRAIGHT: Brand: MEDLINE

## (undated) DEVICE — GLOVE ORANGE PI 7   MSG9070

## (undated) DEVICE — CATHETER EP CRD 10 MM 5 FRX120 CM QPLR RESPON

## (undated) DEVICE — BATTERY PACK FOR VARISPEED: Brand: STRYKER VARISPEED

## (undated) DEVICE — SURGICAL PROCEDURE TRAY EPIDURAL CUST

## (undated) DEVICE — BLOWER COR ART L16.5CM PLAS SHFT MAL W/ MIST IV SET AXIUS

## (undated) DEVICE — DRAIN SURG SGL COLL PT TB FOR ATS BG OASIS

## (undated) DEVICE — GLOVE SURG SZ 65 THK91MIL LTX FREE SYN POLYISOPRENE

## (undated) DEVICE — SHEATH INTRO 6FR L11CM 0.038IN SIL TRICSP VLV W/ GWIRE

## (undated) DEVICE — CATHETER ETER EP 5FR L120CM 5MM SPC 1MM BND QPLR TORQ CTRL

## (undated) DEVICE — AORTIC PUNCHES ARE USED TO CREATE A UNIFORM OPENING IN BLOOD VESSELS DURING CARDIOVASCULAR SURGERY. THE VESSEL GRAFT IS INSERTED INTO THE CREATED OPENING AND SUTURED TO THE VESSEL WALL. AORTIC LANCETS ARE USED TO MAKE A SMALL UNIFORM CUT IN A BLOOD VESSEL TO FACILITATE INSERTION OF AN AORTIC PUNCH.  PUNCHES COME IN VARIOUS LENGTHS, DIAMETERS AND TIP CONFIGURATIONS.: Brand: CLEANCUT ROTATING AORTIC PUNCH

## (undated) DEVICE — DEFENDO AIR WATER SUCTION AND BIOPSY VALVE KIT FOR  OLYMPUS: Brand: DEFENDO AIR/WATER/SUCTION AND BIOPSY VALVE

## (undated) DEVICE — GLOVE SURG SZ 7.5 L11.73IN FNGR THK9.8MIL STRW LTX POLYMER

## (undated) DEVICE — BASIC SINGLE BASIN 1-LF: Brand: MEDLINE INDUSTRIES, INC.

## (undated) DEVICE — DRAPE WARMER SLUSH 66X44IN

## (undated) DEVICE — OPEN HEART: Brand: MEDLINE INDUSTRIES, INC.

## (undated) DEVICE — GLOVE ORANGE PI 7 1/2   MSG9075

## (undated) DEVICE — GLOVE SURG SZ 6 THK91MIL LTX FREE SYN POLYISOPRENE ANTI

## (undated) DEVICE — PACK OPEN HRT DRP

## (undated) DEVICE — CANNULA INJ L2.5IN BLNT TIP 3MM CLR BODY W/ 1 W VLV DLP

## (undated) DEVICE — CATHETER THOR 32FR L23IN PVC 6 EYELET STR ATRAUM

## (undated) DEVICE — SET SURG BASIN OPEN HEART NO  1 REUSABLE

## (undated) DEVICE — BITEBLOCK 54FR W/ DENT RIM BLOX

## (undated) DEVICE — 6 FOOT DISPOSABLE EXTENSION CABLE WITH SAFE CONNECT / SCREW-DOWN

## (undated) DEVICE — CATHETER,URETHRAL,REDRUBBER,STRL,18FR: Brand: MEDLINE

## (undated) DEVICE — CATHETER EP MAP RESPON 6FR QPLR 5MM ELECTRD SPC JSN CRV

## (undated) DEVICE — ADHESIVE SKIN CLOSURE TOP 36 CC HI VISC DERMBND MINI

## (undated) DEVICE — SPONGE GZ W4XL4IN RAYON POLY FILL CVR W/ NONWOVEN FAB

## (undated) DEVICE — AGENT HEMSTAT W4XL8IN OXIDIZED REGENERATED CELOS ABSRB

## (undated) DEVICE — CONNECTOR IRRIGATION AUXILIARY H2O JET W/ PRT MTL THRD HYDR

## (undated) DEVICE — GOWN,SIRUS,FABRNF,L,20/CS: Brand: MEDLINE

## (undated) DEVICE — TOWEL,OR,DSP,ST,WHITE,DLX,4/PK,20PK/CS: Brand: MEDLINE

## (undated) DEVICE — TUBING PRSS MON L48IN PVC RIG NONEXPANDING M TO FEM CONN

## (undated) DEVICE — PAD, DEFIB, ADULT, RADIOTRAN, PHYSIO, LO: Brand: MEDLINE

## (undated) DEVICE — CATHETER THOR 32FR L23IN PVC 5 EYELET STR ATRAUM

## (undated) DEVICE — MARKER A/C LOCATOR GRAFT U SILICONE

## (undated) DEVICE — Z DISCONTINUED NO SUB IDED TUBING ETCO2 AD L6.5FT NSL ORAL CVD PRNG NONFLARED TIP OVR

## (undated) DEVICE — SOLUTION IV 50ML 0.9% SOD CHL PLAS CONT USP VIAFLX

## (undated) DEVICE — TOWEL,OR,DSP,ST,BLUE,STD,6/PK,12PK/CS: Brand: MEDLINE

## (undated) DEVICE — GOWN,SIRUS,FABRNF,XL,20/CS: Brand: MEDLINE

## (undated) DEVICE — ALCOHOL RUBBING ISO 16OZ 70%